# Patient Record
Sex: FEMALE | Race: BLACK OR AFRICAN AMERICAN | NOT HISPANIC OR LATINO | Employment: OTHER | URBAN - METROPOLITAN AREA
[De-identification: names, ages, dates, MRNs, and addresses within clinical notes are randomized per-mention and may not be internally consistent; named-entity substitution may affect disease eponyms.]

---

## 2023-04-04 ENCOUNTER — HOSPITAL ENCOUNTER (OUTPATIENT)
Dept: DATA CONVERSION | Facility: HOSPITAL | Age: 78
End: 2023-04-04
Attending: STUDENT IN AN ORGANIZED HEALTH CARE EDUCATION/TRAINING PROGRAM
Payer: MEDICARE

## 2023-04-04 DIAGNOSIS — G47.33 OBSTRUCTIVE SLEEP APNEA (ADULT) (PEDIATRIC): ICD-10-CM

## 2023-04-04 DIAGNOSIS — M54.12 RADICULOPATHY, CERVICAL REGION: ICD-10-CM

## 2023-04-04 DIAGNOSIS — F32.A DEPRESSION, UNSPECIFIED: ICD-10-CM

## 2023-04-04 DIAGNOSIS — I48.91 UNSPECIFIED ATRIAL FIBRILLATION (MULTI): ICD-10-CM

## 2023-04-04 DIAGNOSIS — K29.70 GASTRITIS, UNSPECIFIED, WITHOUT BLEEDING: ICD-10-CM

## 2023-04-04 DIAGNOSIS — F41.9 ANXIETY DISORDER, UNSPECIFIED: ICD-10-CM

## 2023-04-04 DIAGNOSIS — J44.9 CHRONIC OBSTRUCTIVE PULMONARY DISEASE, UNSPECIFIED (MULTI): ICD-10-CM

## 2023-04-04 DIAGNOSIS — E78.5 HYPERLIPIDEMIA, UNSPECIFIED: ICD-10-CM

## 2023-04-04 DIAGNOSIS — K21.9 GASTRO-ESOPHAGEAL REFLUX DISEASE WITHOUT ESOPHAGITIS: ICD-10-CM

## 2023-04-04 DIAGNOSIS — D12.3 BENIGN NEOPLASM OF TRANSVERSE COLON: ICD-10-CM

## 2023-04-04 DIAGNOSIS — Z92.21 PERSONAL HISTORY OF ANTINEOPLASTIC CHEMOTHERAPY: ICD-10-CM

## 2023-04-04 DIAGNOSIS — K57.30 DIVERTICULOSIS OF LARGE INTESTINE WITHOUT PERFORATION OR ABSCESS WITHOUT BLEEDING: ICD-10-CM

## 2023-04-04 DIAGNOSIS — Z98.0 INTESTINAL BYPASS AND ANASTOMOSIS STATUS: ICD-10-CM

## 2023-04-04 DIAGNOSIS — D12.4 BENIGN NEOPLASM OF DESCENDING COLON: ICD-10-CM

## 2023-04-04 DIAGNOSIS — I10 ESSENTIAL (PRIMARY) HYPERTENSION: ICD-10-CM

## 2023-04-04 DIAGNOSIS — Z85.51 PERSONAL HISTORY OF MALIGNANT NEOPLASM OF BLADDER: ICD-10-CM

## 2023-04-04 DIAGNOSIS — I25.2 OLD MYOCARDIAL INFARCTION: ICD-10-CM

## 2023-04-04 DIAGNOSIS — Z85.3 PERSONAL HISTORY OF MALIGNANT NEOPLASM OF BREAST: ICD-10-CM

## 2023-04-04 DIAGNOSIS — I25.10 ATHEROSCLEROTIC HEART DISEASE OF NATIVE CORONARY ARTERY WITHOUT ANGINA PECTORIS: ICD-10-CM

## 2023-04-04 DIAGNOSIS — Z86.73 PERSONAL HISTORY OF TRANSIENT ISCHEMIC ATTACK (TIA), AND CEREBRAL INFARCTION WITHOUT RESIDUAL DEFICITS: ICD-10-CM

## 2023-04-04 DIAGNOSIS — Z90.12 ACQUIRED ABSENCE OF LEFT BREAST AND NIPPLE: ICD-10-CM

## 2023-04-04 DIAGNOSIS — Z86.010 PERSONAL HISTORY OF COLONIC POLYPS: ICD-10-CM

## 2023-04-04 DIAGNOSIS — M35.3 POLYMYALGIA RHEUMATICA (MULTI): ICD-10-CM

## 2023-04-04 DIAGNOSIS — Z12.11 ENCOUNTER FOR SCREENING FOR MALIGNANT NEOPLASM OF COLON: ICD-10-CM

## 2023-04-04 DIAGNOSIS — K64.8 OTHER HEMORRHOIDS: ICD-10-CM

## 2023-04-04 DIAGNOSIS — K44.9 DIAPHRAGMATIC HERNIA WITHOUT OBSTRUCTION OR GANGRENE: ICD-10-CM

## 2023-04-04 DIAGNOSIS — R13.10 DYSPHAGIA, UNSPECIFIED: ICD-10-CM

## 2023-04-04 DIAGNOSIS — K64.4 RESIDUAL HEMORRHOIDAL SKIN TAGS: ICD-10-CM

## 2023-04-04 DIAGNOSIS — Z80.0 FAMILY HISTORY OF MALIGNANT NEOPLASM OF DIGESTIVE ORGANS: ICD-10-CM

## 2023-04-04 DIAGNOSIS — Z87.891 PERSONAL HISTORY OF NICOTINE DEPENDENCE: ICD-10-CM

## 2023-04-11 LAB
COMPLETE PATHOLOGY REPORT: NORMAL
CONVERTED CLINICAL DIAGNOSIS-HISTORY: NORMAL
CONVERTED FINAL DIAGNOSIS: NORMAL
CONVERTED FINAL REPORT PDF LINK TO COPY AND PASTE: NORMAL
CONVERTED GROSS DESCRIPTION: NORMAL

## 2023-04-25 DIAGNOSIS — R42 DIZZINESS: Primary | ICD-10-CM

## 2023-04-25 RX ORDER — MECLIZINE HCL 12.5 MG 12.5 MG/1
12.5 TABLET ORAL 3 TIMES DAILY PRN
Qty: 30 TABLET | Refills: 1 | Status: SHIPPED | OUTPATIENT
Start: 2023-04-25 | End: 2024-04-05 | Stop reason: HOSPADM

## 2023-04-25 RX ORDER — MECLIZINE HCL 12.5 MG 12.5 MG/1
12.5 TABLET ORAL 3 TIMES DAILY PRN
COMMUNITY
Start: 2023-01-18 | End: 2023-04-25 | Stop reason: SDUPTHER

## 2023-05-11 DIAGNOSIS — G47.00 INSOMNIA, UNSPECIFIED TYPE: Primary | ICD-10-CM

## 2023-05-11 RX ORDER — TRAZODONE HYDROCHLORIDE 50 MG/1
50 TABLET ORAL NIGHTLY PRN
COMMUNITY
End: 2023-05-11 | Stop reason: SDUPTHER

## 2023-05-11 RX ORDER — TRAZODONE HYDROCHLORIDE 50 MG/1
50 TABLET ORAL NIGHTLY PRN
Qty: 90 TABLET | Refills: 0 | Status: SHIPPED | OUTPATIENT
Start: 2023-05-11 | End: 2023-06-21 | Stop reason: SDUPTHER

## 2023-06-21 ENCOUNTER — OFFICE VISIT (OUTPATIENT)
Dept: PRIMARY CARE | Facility: CLINIC | Age: 78
End: 2023-06-21
Payer: MEDICARE

## 2023-06-21 VITALS
OXYGEN SATURATION: 94 % | WEIGHT: 168 LBS | DIASTOLIC BLOOD PRESSURE: 84 MMHG | BODY MASS INDEX: 27.96 KG/M2 | SYSTOLIC BLOOD PRESSURE: 152 MMHG | HEART RATE: 73 BPM

## 2023-06-21 DIAGNOSIS — D05.12 DUCTAL CARCINOMA IN SITU (DCIS) OF LEFT BREAST: ICD-10-CM

## 2023-06-21 DIAGNOSIS — I63.9 CEREBROVASCULAR ACCIDENT (CVA), UNSPECIFIED MECHANISM (MULTI): ICD-10-CM

## 2023-06-21 DIAGNOSIS — R53.83 FATIGUE, UNSPECIFIED TYPE: ICD-10-CM

## 2023-06-21 DIAGNOSIS — J44.9 CHRONIC OBSTRUCTIVE PULMONARY DISEASE, UNSPECIFIED COPD TYPE (MULTI): ICD-10-CM

## 2023-06-21 DIAGNOSIS — C67.9 UROTHELIAL CARCINOMA OF BLADDER (MULTI): ICD-10-CM

## 2023-06-21 DIAGNOSIS — J32.9 CHRONIC SINUSITIS, UNSPECIFIED LOCATION: ICD-10-CM

## 2023-06-21 DIAGNOSIS — I10 ESSENTIAL HYPERTENSION: Primary | ICD-10-CM

## 2023-06-21 DIAGNOSIS — I73.9 CLAUDICATION (CMS-HCC): ICD-10-CM

## 2023-06-21 DIAGNOSIS — H81.11 BENIGN PAROXYSMAL POSITIONAL VERTIGO OF RIGHT EAR: ICD-10-CM

## 2023-06-21 DIAGNOSIS — G47.00 INSOMNIA, UNSPECIFIED TYPE: ICD-10-CM

## 2023-06-21 DIAGNOSIS — E78.2 MIXED HYPERLIPIDEMIA: ICD-10-CM

## 2023-06-21 DIAGNOSIS — G95.9 CERVICAL MYELOPATHY (MULTI): ICD-10-CM

## 2023-06-21 DIAGNOSIS — R35.0 URINARY FREQUENCY: ICD-10-CM

## 2023-06-21 PROBLEM — M48.061 LUMBAR CANAL STENOSIS: Status: ACTIVE | Noted: 2023-06-21

## 2023-06-21 PROBLEM — R31.9 HEMATURIA: Status: ACTIVE | Noted: 2023-06-21

## 2023-06-21 PROBLEM — R63.4 WEIGHT LOSS, UNINTENTIONAL: Status: ACTIVE | Noted: 2023-06-21

## 2023-06-21 PROBLEM — I38 VALVULAR HEART DISEASE: Status: ACTIVE | Noted: 2023-06-21

## 2023-06-21 PROBLEM — R51.9 FREQUENT HEADACHES: Status: ACTIVE | Noted: 2023-06-21

## 2023-06-21 PROBLEM — I25.10 ATHEROSCLEROTIC HEART DISEASE OF NATIVE CORONARY ARTERY WITHOUT ANGINA PECTORIS: Status: ACTIVE | Noted: 2023-06-21

## 2023-06-21 PROBLEM — M50.00 CERVICAL DISC DISORDER WITH MYELOPATHY: Status: ACTIVE | Noted: 2023-06-21

## 2023-06-21 PROBLEM — K57.90 DIVERTICULOSIS: Status: ACTIVE | Noted: 2023-06-21

## 2023-06-21 PROBLEM — Z86.73: Status: ACTIVE | Noted: 2023-06-21

## 2023-06-21 PROBLEM — J30.9 ALLERGIC RHINITIS: Status: ACTIVE | Noted: 2023-06-21

## 2023-06-21 PROBLEM — R42 DIZZINESS: Status: ACTIVE | Noted: 2023-06-21

## 2023-06-21 PROBLEM — H52.223 REGULAR ASTIGMATISM OF BOTH EYES WITH PRESBYOPIA: Status: ACTIVE | Noted: 2023-06-21

## 2023-06-21 PROBLEM — R13.10 DYSPHAGIA: Status: ACTIVE | Noted: 2023-06-21

## 2023-06-21 PROBLEM — M54.2 CERVICALGIA: Status: ACTIVE | Noted: 2023-06-21

## 2023-06-21 PROBLEM — K57.32 DIVERTICULITIS OF COLON: Status: ACTIVE | Noted: 2023-06-21

## 2023-06-21 PROBLEM — E78.5 HYPERLIPIDEMIA: Status: ACTIVE | Noted: 2023-06-21

## 2023-06-21 PROBLEM — R20.2 NUMBNESS AND TINGLING IN BOTH HANDS: Status: ACTIVE | Noted: 2023-06-21

## 2023-06-21 PROBLEM — H53.461 RIGHT HOMONYMOUS HEMIANOPSIA: Status: ACTIVE | Noted: 2023-06-21

## 2023-06-21 PROBLEM — Z86.0100 HISTORY OF COLON POLYPS: Status: ACTIVE | Noted: 2023-06-21

## 2023-06-21 PROBLEM — M79.2 NEURALGIA AND NEURITIS: Status: ACTIVE | Noted: 2023-06-21

## 2023-06-21 PROBLEM — G47.33 OBSTRUCTIVE SLEEP APNEA: Status: ACTIVE | Noted: 2023-06-21

## 2023-06-21 PROBLEM — R79.9 ABNORMAL BLOOD CHEMISTRY: Status: ACTIVE | Noted: 2023-06-21

## 2023-06-21 PROBLEM — N64.89 RADIAL SCAR OF BREAST: Status: ACTIVE | Noted: 2023-06-21

## 2023-06-21 PROBLEM — H90.3 ASYMMETRIC SNHL (SENSORINEURAL HEARING LOSS): Status: ACTIVE | Noted: 2023-06-21

## 2023-06-21 PROBLEM — C50.412 MALIGNANT NEOPLASM OF UPPER-OUTER QUADRANT OF LEFT FEMALE BREAST (MULTI): Status: ACTIVE | Noted: 2023-06-21

## 2023-06-21 PROBLEM — F32.A DEPRESSION: Status: ACTIVE | Noted: 2023-06-21

## 2023-06-21 PROBLEM — H93.12 TINNITUS OF LEFT EAR: Status: ACTIVE | Noted: 2023-06-21

## 2023-06-21 PROBLEM — R26.9 GAIT DISTURBANCE: Status: ACTIVE | Noted: 2023-06-21

## 2023-06-21 PROBLEM — F41.9 ANXIETY: Status: ACTIVE | Noted: 2023-06-21

## 2023-06-21 PROBLEM — M79.606 LOWER EXTREMITY PAIN: Status: ACTIVE | Noted: 2023-06-21

## 2023-06-21 PROBLEM — N20.0 KIDNEY STONE: Status: ACTIVE | Noted: 2023-06-21

## 2023-06-21 PROBLEM — D49.4 BLADDER TUMOR: Status: ACTIVE | Noted: 2023-06-21

## 2023-06-21 PROBLEM — R10.9 RIGHT FLANK PAIN: Status: ACTIVE | Noted: 2023-06-21

## 2023-06-21 PROBLEM — K29.70 GASTRITIS: Status: ACTIVE | Noted: 2023-06-21

## 2023-06-21 PROBLEM — R31.0 CLOT HEMATURIA: Status: ACTIVE | Noted: 2023-06-21

## 2023-06-21 PROBLEM — R42 VERTIGO: Status: ACTIVE | Noted: 2023-06-21

## 2023-06-21 PROBLEM — J98.4 RESTRICTIVE LUNG DISEASE: Status: ACTIVE | Noted: 2023-06-21

## 2023-06-21 PROBLEM — R94.39 ABNORMAL STRESS TEST: Status: ACTIVE | Noted: 2023-06-21

## 2023-06-21 PROBLEM — R93.89 ABNORMAL CHEST CT: Status: ACTIVE | Noted: 2023-06-21

## 2023-06-21 PROBLEM — J42 CHRONIC BRONCHITIS (MULTI): Status: ACTIVE | Noted: 2023-06-21

## 2023-06-21 PROBLEM — H25.13 AGE-RELATED NUCLEAR CATARACT OF BOTH EYES: Status: ACTIVE | Noted: 2023-06-21

## 2023-06-21 PROBLEM — M48.02 DEGENERATIVE CERVICAL SPINAL STENOSIS: Status: ACTIVE | Noted: 2023-06-21

## 2023-06-21 PROBLEM — R20.0 NUMBNESS AND TINGLING IN BOTH HANDS: Status: ACTIVE | Noted: 2023-06-21

## 2023-06-21 PROBLEM — R44.1 VISUAL HALLUCINATIONS: Status: ACTIVE | Noted: 2023-06-21

## 2023-06-21 PROBLEM — H04.123 DRY EYES, BILATERAL: Status: ACTIVE | Noted: 2023-06-21

## 2023-06-21 PROBLEM — H52.03 HYPEROPIA OF BOTH EYES: Status: ACTIVE | Noted: 2023-06-21

## 2023-06-21 PROBLEM — Z98.1 S/P CERVICAL SPINAL FUSION: Status: ACTIVE | Noted: 2023-06-21

## 2023-06-21 PROBLEM — H25.813 COMBINED FORM OF AGE-RELATED CATARACT, BOTH EYES: Status: ACTIVE | Noted: 2023-06-21

## 2023-06-21 PROBLEM — N32.89 BLADDER MASS: Status: ACTIVE | Noted: 2023-06-21

## 2023-06-21 PROBLEM — K21.9 ESOPHAGEAL REFLUX: Status: ACTIVE | Noted: 2023-06-21

## 2023-06-21 PROBLEM — R68.81 EARLY SATIETY: Status: ACTIVE | Noted: 2023-06-21

## 2023-06-21 PROBLEM — M19.90 ARTHRITIS: Status: ACTIVE | Noted: 2023-06-21

## 2023-06-21 PROBLEM — M75.02 ADHESIVE CAPSULITIS OF LEFT SHOULDER: Status: ACTIVE | Noted: 2023-06-21

## 2023-06-21 PROBLEM — R41.3 MEMORY CHANGES: Status: ACTIVE | Noted: 2023-06-21

## 2023-06-21 PROBLEM — M25.519 SHOULDER PAIN: Status: ACTIVE | Noted: 2023-06-21

## 2023-06-21 PROBLEM — R59.1 LYMPHADENOPATHY: Status: ACTIVE | Noted: 2023-06-21

## 2023-06-21 PROBLEM — H90.3 BILATERAL SENSORINEURAL HEARING LOSS: Status: ACTIVE | Noted: 2023-06-21

## 2023-06-21 PROBLEM — Z86.010 HISTORY OF COLON POLYPS: Status: ACTIVE | Noted: 2023-06-21

## 2023-06-21 PROBLEM — M47.12 CERVICAL SPONDYLOSIS WITH MYELOPATHY: Status: ACTIVE | Noted: 2023-06-21

## 2023-06-21 PROBLEM — M50.20 CERVICAL DISC HERNIATION: Status: ACTIVE | Noted: 2023-06-21

## 2023-06-21 PROBLEM — M54.16 LUMBAR RADICULITIS: Status: ACTIVE | Noted: 2023-06-21

## 2023-06-21 PROBLEM — E55.9 VITAMIN D INSUFFICIENCY: Status: ACTIVE | Noted: 2023-06-21

## 2023-06-21 PROBLEM — F17.210 CIGARETTE NICOTINE DEPENDENCE WITHOUT COMPLICATION: Status: ACTIVE | Noted: 2023-06-21

## 2023-06-21 PROBLEM — H52.4 REGULAR ASTIGMATISM OF BOTH EYES WITH PRESBYOPIA: Status: ACTIVE | Noted: 2023-06-21

## 2023-06-21 PROBLEM — R09.81 CONGESTION OF NASAL SINUS: Status: ACTIVE | Noted: 2023-06-21

## 2023-06-21 PROBLEM — M25.559 HIP PAIN: Status: ACTIVE | Noted: 2023-06-21

## 2023-06-21 PROBLEM — M35.3 PMR (POLYMYALGIA RHEUMATICA) (MULTI): Status: ACTIVE | Noted: 2023-06-21

## 2023-06-21 PROBLEM — R92.8 MAMMOGRAM ABNORMAL: Status: ACTIVE | Noted: 2023-06-21

## 2023-06-21 PROCEDURE — 99214 OFFICE O/P EST MOD 30 MIN: CPT | Performed by: PHYSICIAN ASSISTANT

## 2023-06-21 PROCEDURE — 1160F RVW MEDS BY RX/DR IN RCRD: CPT | Performed by: PHYSICIAN ASSISTANT

## 2023-06-21 PROCEDURE — 1159F MED LIST DOCD IN RCRD: CPT | Performed by: PHYSICIAN ASSISTANT

## 2023-06-21 PROCEDURE — 1036F TOBACCO NON-USER: CPT | Performed by: PHYSICIAN ASSISTANT

## 2023-06-21 PROCEDURE — 3077F SYST BP >= 140 MM HG: CPT | Performed by: PHYSICIAN ASSISTANT

## 2023-06-21 PROCEDURE — 3079F DIAST BP 80-89 MM HG: CPT | Performed by: PHYSICIAN ASSISTANT

## 2023-06-21 RX ORDER — ASPIRIN 81 MG/1
1 TABLET ORAL DAILY
COMMUNITY
Start: 2022-05-02

## 2023-06-21 RX ORDER — NORTRIPTYLINE HYDROCHLORIDE 10 MG/1
20 CAPSULE ORAL NIGHTLY
COMMUNITY
Start: 2022-10-19 | End: 2023-06-21 | Stop reason: ALTCHOICE

## 2023-06-21 RX ORDER — FUROSEMIDE 20 MG/1
1 TABLET ORAL DAILY
COMMUNITY
Start: 2018-05-21 | End: 2024-01-08 | Stop reason: SDUPTHER

## 2023-06-21 RX ORDER — OMEPRAZOLE 20 MG/1
CAPSULE, DELAYED RELEASE ORAL
COMMUNITY
Start: 2023-04-04 | End: 2023-06-21 | Stop reason: ALTCHOICE

## 2023-06-21 RX ORDER — APIXABAN 5 MG/1
1 TABLET, FILM COATED ORAL 2 TIMES DAILY
COMMUNITY
Start: 2022-04-04 | End: 2024-01-24 | Stop reason: SDUPTHER

## 2023-06-21 RX ORDER — METOPROLOL TARTRATE 25 MG/1
1 TABLET, FILM COATED ORAL 2 TIMES DAILY
COMMUNITY
Start: 2022-04-25 | End: 2024-01-08 | Stop reason: SDUPTHER

## 2023-06-21 RX ORDER — IBUPROFEN 200 MG
TABLET ORAL
COMMUNITY
Start: 2022-08-03 | End: 2023-06-21 | Stop reason: ALTCHOICE

## 2023-06-21 RX ORDER — AMLODIPINE BESYLATE 5 MG/1
1 TABLET ORAL DAILY
COMMUNITY
Start: 2015-06-08 | End: 2024-04-09 | Stop reason: SDUPTHER

## 2023-06-21 RX ORDER — LOSARTAN POTASSIUM 100 MG/1
1 TABLET ORAL DAILY
COMMUNITY
Start: 2013-01-21 | End: 2023-11-17 | Stop reason: HOSPADM

## 2023-06-21 RX ORDER — ROSUVASTATIN CALCIUM 5 MG/1
1 TABLET, COATED ORAL NIGHTLY
COMMUNITY
Start: 2022-04-25 | End: 2024-05-13 | Stop reason: SDUPTHER

## 2023-06-21 RX ORDER — NITROGLYCERIN 0.4 MG/1
TABLET SUBLINGUAL
COMMUNITY
Start: 2016-11-21 | End: 2024-01-22 | Stop reason: SDUPTHER

## 2023-06-21 RX ORDER — FLUTICASONE PROPIONATE 50 MCG
SPRAY, SUSPENSION (ML) NASAL
COMMUNITY
Start: 2023-03-14 | End: 2023-06-21 | Stop reason: ALTCHOICE

## 2023-06-21 RX ORDER — TRAZODONE HYDROCHLORIDE 50 MG/1
50 TABLET ORAL NIGHTLY PRN
Qty: 90 TABLET | Refills: 0 | Status: SHIPPED | OUTPATIENT
Start: 2023-06-21 | End: 2024-03-21

## 2023-06-21 NOTE — PROGRESS NOTES
"Subjective   Jazzy Schaeffer is a 77 y.o. female who presents for Follow-up.  HPI Jazzy Schaeffer is a 77 y.o. female presenting for a follow up. Generally doing well. Currently c/o:    Urinary frequency, nocturia: No UTI symptoms (fevers, chills, hematuria, dysuria, burning with urination, urine or vaginal odor, abnormal discharge, etc.). She does note having brown urine once last week, but it resolved.     Fatigue: takes VB12 and multivitamin. Need sleep study results, not in EMR for review    CVA: Left PCA territory infarction 3/2022. Stable on 81mg ASA and eliquis 5mg 2x daily. Follows with neurology.  No new neurological deficits or strokelike symptoms.     HTN, HLD, CAD: Hasn't taken meds yet today. Stable on 81mg ASA, Crestor 5mg daily, Amlodipine 5mg, Furosemide 20mg, Losartan 100mg, Metoprolol tartrate 25mg. Follows with cardiology, scheduled next in August 2023.  Denies any dizziness, chest pain, SOB/MARION, palpitations, LE edema.    COPD, nicotine dependence: former smoker. Not on any inhalers. No wheezing, SOB/MARION.     BPPV: follows with ENT, last seen 3/14/23. She still is having episodes of vertigo, which affect her ADLs.     L breast CA: \"s/p left breast mastectomy on 5/22/19. She had a left breast excisional biopsy on 4/8/19 for a core biopsy atypical ductal hyperplasia. Final pathology yielded ductal carcinoma in situ, ER + 60%, UT + 1-5%, 1.8 cm, positive margin < 1mm, pTis. She had a completion mastectomy because she had a previous lumpectomy sentinel node biopsy and radiation therapy for cancer 30 years previously. which showed residual DCIS with clear margins.\" Follows with breast surgery. Scheduled for a mammogram 8/10/23.     Insomnia: difficulty staying asleep. Endorses falling asleep at 9pm and waking by 12am. She does not nap at all during the day. Uses proper sleep hygiene. Completed a sleep study as ordered by neurologist, but hasn't received the results. Not in EMR for review. "     Health maintenance:  Immunizations:  -Flu: deferred to fall 2023  -Pneumococcal: UTD  -Shingrix: Received 1 dose of Shingrix, encouraged second dose from pharmacy  -Tdap: recommended every 10 years, obtain from local pharmacy  Mammogram UTD (last 8/4/22) - per Dr. Elmore   Colon CA screening UTD (last 11/15/17) - 5yrs., Look in records   DEXA DUE (none prior) - ordered     Last MCR / CPE: 1/18/23 (MCR ADV)    Immunizations:  -Flu: UTD  -COVID: received 4 doses  -Pneumococcal: UTD  -Shingrix: received 1 dose, needs 2nd dose    12 point ROS reviewed and negative other than as stated in HPI    /84   Pulse 73   Wt 76.2 kg (168 lb)   SpO2 94%   BMI 27.96 kg/m²   Objective   Physical Exam  Vitals reviewed.   Constitutional:       General: She is not in acute distress.     Appearance: Normal appearance. She is not ill-appearing.   HENT:      Head: Normocephalic and atraumatic.   Eyes:      General: No scleral icterus.     Extraocular Movements: Extraocular movements intact.      Conjunctiva/sclera: Conjunctivae normal.      Pupils: Pupils are equal, round, and reactive to light.   Cardiovascular:      Rate and Rhythm: Normal rate and regular rhythm.      Heart sounds: Normal heart sounds. No murmur heard.     No friction rub. No gallop.   Pulmonary:      Effort: Pulmonary effort is normal. No respiratory distress.      Breath sounds: Normal breath sounds. No stridor. No wheezing, rhonchi or rales.   Musculoskeletal:      Cervical back: Normal range of motion.      Right lower leg: No edema.      Left lower leg: No edema.   Skin:     General: Skin is warm and dry.   Neurological:      Mental Status: She is alert and oriented to person, place, and time. Mental status is at baseline.      Cranial Nerves: No cranial nerve deficit.      Gait: Gait normal.   Psychiatric:         Mood and Affect: Mood normal.         Behavior: Behavior normal.         Assessment/Plan   Problem List Items Addressed This Visit        "Benign paroxysmal positional vertigo of right ear     Patient still with symptoms of vertigo despite meclizine use.  Referral for vestibular physical therapy placed         Relevant Orders    Referral to Physical Therapy    Cervical myelopathy (CMS/[facility])     Stable. S/p C3-4, C4-5, C5-6 Anterior Cervical Discectomy and Fusion with neurosurgeon, Dr. Major, on 6/15/2020         Chronic sinusitis     Referral to ENT placed per patient's request.         Relevant Orders    Referral to ENT    Claudication (CMS/[facility])     Stable.  Continue 81 mg ASA and regular exercise.         COPD (chronic obstructive pulmonary disease) (CMS/[facility])     Stable without any symptoms of wheezing, cough, sputum, SOB/MARION.  Not on any inhalers.  Former smoker.         Ductal carcinoma in situ (DCIS) of left breast     \"s/p left breast mastectomy on 5/22/19. She had a left breast excisional biopsy on 4/8/19 for a core biopsy atypical ductal hyperplasia. Final pathology yielded ductal carcinoma in situ, ER + 60%, NV + 1-5%, 1.8 cm, positive margin < 1mm, pTis. She had a completion mastectomy because she had a previous lumpectomy sentinel node biopsy and radiation therapy for cancer 30 years previously. which showed residual DCIS with clear margins.\" Follows with breast surgery. Scheduled for a mammogram 8/10/23.          Fatigue     Continue multivitamin and vitamin B12 daily.  Need results of sleep study as EVA is on the differential given excessive daytime sleepiness.         Essential hypertension - Primary     BP above goal.  Encouraged patient to take BP medications when she gets home.  Use the home BP cuff to monitor readings closely.  If consistently >130/80, I recommend switching losartan 100 mg to olmesartan 40 mg or valsartan 320 mg.  Alternatively could increase amlodipine to 10 mg daily.  Follow strict DASH diet and exercise regularly as tolerated.         Insomnia     Begin retaking trazodone 50 mg at bedtime.  No sleep study in EMR " for review at this time, contact neurology who ordered it         Relevant Medications    traZODone (Desyrel) 50 mg tablet    Hyperlipidemia     Stable.  Continue 81 mg ASA and rosuvastatin 5 mg.  Follow Mediterranean-style diet and exercise as tolerated         Urinary frequency     Consider UA/culture. Encouraged Kegel exercises.  Follow-up with urologist if persistent.         Stroke (CMS/HCC)     Left PCA territory infarction 3/2022. Stable on 81mg ASA and eliquis 5mg 2x daily. Follow with neurology.  Go to the ED/call EMS with any acute neurodeficits or strokelike symptoms.         Urothelial carcinoma of bladder (CMS/HCC)     S/p TURBT and chemotherapy. Follows with Urologist, Dr. Blela.              Follow-up in 4 months or sooner as needed

## 2023-06-23 PROBLEM — R31.0 CLOT HEMATURIA: Status: RESOLVED | Noted: 2023-06-21 | Resolved: 2023-06-23

## 2023-06-23 NOTE — ASSESSMENT & PLAN NOTE
Continue multivitamin and vitamin B12 daily.  Need results of sleep study as EVA is on the differential given excessive daytime sleepiness.

## 2023-06-23 NOTE — ASSESSMENT & PLAN NOTE
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 Stable on 81mg ASA and eliquis 5mg 2x daily. Follow with neurology.  Go to the ED/call EMS with any acute neurodeficits or strokelike symptoms.

## 2023-06-23 NOTE — ASSESSMENT & PLAN NOTE
BP above goal.  Encouraged patient to take BP medications when she gets home.  Use the home BP cuff to monitor readings closely.  If consistently >130/80, I recommend switching losartan 100 mg to olmesartan 40 mg or valsartan 320 mg.  Alternatively could increase amlodipine to 10 mg daily.  Follow strict DASH diet and exercise regularly as tolerated.

## 2023-06-23 NOTE — ASSESSMENT & PLAN NOTE
Begin retaking trazodone 50 mg at bedtime.  No sleep study in EMR for review at this time, contact neurology who ordered it

## 2023-06-23 NOTE — ASSESSMENT & PLAN NOTE
Patient still with symptoms of vertigo despite meclizine use.  Referral for vestibular physical therapy placed

## 2023-06-23 NOTE — ASSESSMENT & PLAN NOTE
Stable.  Continue 81 mg ASA and rosuvastatin 5 mg.  Follow Mediterranean-style diet and exercise as tolerated

## 2023-06-23 NOTE — ASSESSMENT & PLAN NOTE
"\"s/p left breast mastectomy on 5/22/19. She had a left breast excisional biopsy on 4/8/19 for a core biopsy atypical ductal hyperplasia. Final pathology yielded ductal carcinoma in situ, ER + 60%, CA + 1-5%, 1.8 cm, positive margin < 1mm, pTis. She had a completion mastectomy because she had a previous lumpectomy sentinel node biopsy and radiation therapy for cancer 30 years previously. which showed residual DCIS with clear margins.\" Follows with breast surgery. Scheduled for a mammogram 8/10/23.   "

## 2023-06-23 NOTE — ASSESSMENT & PLAN NOTE
Stable. S/p C3-4, C4-5, C5-6 Anterior Cervical Discectomy and Fusion with neurosurgeon, Dr. Major, on 6/15/2020

## 2023-06-23 NOTE — ASSESSMENT & PLAN NOTE
Stable without any symptoms of wheezing, cough, sputum, SOB/MARION.  Not on any inhalers.  Former smoker.

## 2023-10-23 PROBLEM — R29.818 HEMISENSORY DEFICIT: Status: ACTIVE | Noted: 2023-10-23

## 2023-11-12 ENCOUNTER — HOSPITAL ENCOUNTER (INPATIENT)
Facility: HOSPITAL | Age: 78
LOS: 5 days | Discharge: NURSING, REHAB OR LONG-TERM CARE | DRG: 698 | End: 2023-11-17
Attending: EMERGENCY MEDICINE | Admitting: INTERNAL MEDICINE
Payer: MEDICARE

## 2023-11-12 ENCOUNTER — APPOINTMENT (OUTPATIENT)
Dept: RADIOLOGY | Facility: HOSPITAL | Age: 78
DRG: 698 | End: 2023-11-12
Payer: MEDICARE

## 2023-11-12 DIAGNOSIS — Z86.73 HISTORY OF CARDIOEMBOLIC STROKE: ICD-10-CM

## 2023-11-12 DIAGNOSIS — N28.0 RENAL INFARCTION (MULTI): Primary | ICD-10-CM

## 2023-11-12 DIAGNOSIS — R79.89 ELEVATED TROPONIN: ICD-10-CM

## 2023-11-12 DIAGNOSIS — I50.9 ACUTE ON CHRONIC CONGESTIVE HEART FAILURE, UNSPECIFIED HEART FAILURE TYPE (MULTI): ICD-10-CM

## 2023-11-12 DIAGNOSIS — I48.0 PAROXYSMAL ATRIAL FIBRILLATION (MULTI): ICD-10-CM

## 2023-11-12 DIAGNOSIS — R10.9 RIGHT FLANK PAIN: ICD-10-CM

## 2023-11-12 DIAGNOSIS — I70.1 ATHEROSCLEROSIS OF RENAL ARTERY (CMS-HCC): ICD-10-CM

## 2023-11-12 LAB
ALBUMIN SERPL BCP-MCNC: 4.1 G/DL (ref 3.4–5)
ALP SERPL-CCNC: 74 U/L (ref 33–136)
ALT SERPL W P-5'-P-CCNC: 53 U/L (ref 7–45)
ANION GAP SERPL CALC-SCNC: 16 MMOL/L (ref 10–20)
APPEARANCE UR: CLEAR
AST SERPL W P-5'-P-CCNC: 79 U/L (ref 9–39)
BASOPHILS # BLD AUTO: 0.03 X10*3/UL (ref 0–0.1)
BASOPHILS NFR BLD AUTO: 0.4 %
BILIRUB SERPL-MCNC: 0.6 MG/DL (ref 0–1.2)
BILIRUB UR STRIP.AUTO-MCNC: NEGATIVE MG/DL
BNP SERPL-MCNC: 699 PG/ML (ref 0–99)
BUN SERPL-MCNC: 10 MG/DL (ref 6–23)
CALCIUM SERPL-MCNC: 9.2 MG/DL (ref 8.6–10.6)
CARDIAC TROPONIN I PNL SERPL HS: 385 NG/L (ref 0–34)
CARDIAC TROPONIN I PNL SERPL HS: 416 NG/L (ref 0–34)
CHLORIDE SERPL-SCNC: 103 MMOL/L (ref 98–107)
CO2 SERPL-SCNC: 24 MMOL/L (ref 21–32)
COLOR UR: YELLOW
CREAT SERPL-MCNC: 1.08 MG/DL (ref 0.5–1.05)
EOSINOPHIL # BLD AUTO: 0.12 X10*3/UL (ref 0–0.4)
EOSINOPHIL NFR BLD AUTO: 1.7 %
ERYTHROCYTE [DISTWIDTH] IN BLOOD BY AUTOMATED COUNT: 14.6 % (ref 11.5–14.5)
EST. AVERAGE GLUCOSE BLD GHB EST-MCNC: 114 MG/DL
GFR SERPL CREATININE-BSD FRML MDRD: 53 ML/MIN/1.73M*2
GLUCOSE SERPL-MCNC: 85 MG/DL (ref 74–99)
GLUCOSE UR STRIP.AUTO-MCNC: NEGATIVE MG/DL
HBA1C MFR BLD: 5.6 %
HCT VFR BLD AUTO: 42 % (ref 36–46)
HGB BLD-MCNC: 13.2 G/DL (ref 12–16)
HOLD SPECIMEN: NORMAL
IMM GRANULOCYTES # BLD AUTO: 0.02 X10*3/UL (ref 0–0.5)
IMM GRANULOCYTES NFR BLD AUTO: 0.3 % (ref 0–0.9)
KETONES UR STRIP.AUTO-MCNC: NEGATIVE MG/DL
LEUKOCYTE ESTERASE UR QL STRIP.AUTO: NEGATIVE
LIPASE SERPL-CCNC: 10 U/L (ref 9–82)
LYMPHOCYTES # BLD AUTO: 1.62 X10*3/UL (ref 0.8–3)
LYMPHOCYTES NFR BLD AUTO: 23.3 %
MCH RBC QN AUTO: 25.9 PG (ref 26–34)
MCHC RBC AUTO-ENTMCNC: 31.4 G/DL (ref 32–36)
MCV RBC AUTO: 83 FL (ref 80–100)
MONOCYTES # BLD AUTO: 0.74 X10*3/UL (ref 0.05–0.8)
MONOCYTES NFR BLD AUTO: 10.6 %
NEUTROPHILS # BLD AUTO: 4.42 X10*3/UL (ref 1.6–5.5)
NEUTROPHILS NFR BLD AUTO: 63.7 %
NITRITE UR QL STRIP.AUTO: NEGATIVE
NRBC BLD-RTO: 0 /100 WBCS (ref 0–0)
PH UR STRIP.AUTO: 7 [PH]
PLATELET # BLD AUTO: 252 X10*3/UL (ref 150–450)
POTASSIUM SERPL-SCNC: 4.2 MMOL/L (ref 3.5–5.3)
PROT SERPL-MCNC: 6.9 G/DL (ref 6.4–8.2)
PROT UR STRIP.AUTO-MCNC: ABNORMAL MG/DL
RBC # BLD AUTO: 5.09 X10*6/UL (ref 4–5.2)
RBC # UR STRIP.AUTO: NEGATIVE /UL
RBC #/AREA URNS AUTO: NORMAL /HPF
SODIUM SERPL-SCNC: 139 MMOL/L (ref 136–145)
SP GR UR STRIP.AUTO: 1.01
SQUAMOUS #/AREA URNS AUTO: NORMAL /HPF
UROBILINOGEN UR STRIP.AUTO-MCNC: 2 MG/DL
WBC # BLD AUTO: 7 X10*3/UL (ref 4.4–11.3)
WBC #/AREA URNS AUTO: NORMAL /HPF

## 2023-11-12 PROCEDURE — 85025 COMPLETE CBC W/AUTO DIFF WBC: CPT

## 2023-11-12 PROCEDURE — 81001 URINALYSIS AUTO W/SCOPE: CPT

## 2023-11-12 PROCEDURE — 99285 EMERGENCY DEPT VISIT HI MDM: CPT | Mod: 25 | Performed by: EMERGENCY MEDICINE

## 2023-11-12 PROCEDURE — 36415 COLL VENOUS BLD VENIPUNCTURE: CPT

## 2023-11-12 PROCEDURE — 71046 X-RAY EXAM CHEST 2 VIEWS: CPT | Mod: FOREIGN READ | Performed by: RADIOLOGY

## 2023-11-12 PROCEDURE — 96375 TX/PRO/DX INJ NEW DRUG ADDON: CPT | Performed by: EMERGENCY MEDICINE

## 2023-11-12 PROCEDURE — 2500000004 HC RX 250 GENERAL PHARMACY W/ HCPCS (ALT 636 FOR OP/ED)

## 2023-11-12 PROCEDURE — 2550000001 HC RX 255 CONTRASTS

## 2023-11-12 PROCEDURE — 1210000001 HC SEMI-PRIVATE ROOM DAILY

## 2023-11-12 PROCEDURE — 83036 HEMOGLOBIN GLYCOSYLATED A1C: CPT

## 2023-11-12 PROCEDURE — 74177 CT ABD & PELVIS W/CONTRAST: CPT | Performed by: STUDENT IN AN ORGANIZED HEALTH CARE EDUCATION/TRAINING PROGRAM

## 2023-11-12 PROCEDURE — 83880 ASSAY OF NATRIURETIC PEPTIDE: CPT

## 2023-11-12 PROCEDURE — 2500000001 HC RX 250 WO HCPCS SELF ADMINISTERED DRUGS (ALT 637 FOR MEDICARE OP)

## 2023-11-12 PROCEDURE — 74177 CT ABD & PELVIS W/CONTRAST: CPT

## 2023-11-12 PROCEDURE — 76937 US GUIDE VASCULAR ACCESS: CPT

## 2023-11-12 PROCEDURE — 96374 THER/PROPH/DIAG INJ IV PUSH: CPT | Performed by: EMERGENCY MEDICINE

## 2023-11-12 PROCEDURE — 99285 EMERGENCY DEPT VISIT HI MDM: CPT

## 2023-11-12 PROCEDURE — 99223 1ST HOSP IP/OBS HIGH 75: CPT

## 2023-11-12 PROCEDURE — 93010 ELECTROCARDIOGRAM REPORT: CPT | Performed by: EMERGENCY MEDICINE

## 2023-11-12 PROCEDURE — 96361 HYDRATE IV INFUSION ADD-ON: CPT | Performed by: EMERGENCY MEDICINE

## 2023-11-12 PROCEDURE — 83690 ASSAY OF LIPASE: CPT

## 2023-11-12 PROCEDURE — 71046 X-RAY EXAM CHEST 2 VIEWS: CPT | Mod: FY,FR

## 2023-11-12 PROCEDURE — 80053 COMPREHEN METABOLIC PANEL: CPT

## 2023-11-12 PROCEDURE — 84484 ASSAY OF TROPONIN QUANT: CPT

## 2023-11-12 RX ORDER — TRAZODONE HYDROCHLORIDE 50 MG/1
50 TABLET ORAL NIGHTLY PRN
Status: DISCONTINUED | OUTPATIENT
Start: 2023-11-12 | End: 2023-11-17 | Stop reason: HOSPADM

## 2023-11-12 RX ORDER — METOPROLOL TARTRATE 25 MG/1
25 TABLET, FILM COATED ORAL 2 TIMES DAILY
Status: DISCONTINUED | OUTPATIENT
Start: 2023-11-12 | End: 2023-11-17 | Stop reason: HOSPADM

## 2023-11-12 RX ORDER — FUROSEMIDE 20 MG/1
20 TABLET ORAL
Status: DISCONTINUED | OUTPATIENT
Start: 2023-11-13 | End: 2023-11-17 | Stop reason: HOSPADM

## 2023-11-12 RX ORDER — ROSUVASTATIN CALCIUM 5 MG/1
5 TABLET, COATED ORAL NIGHTLY
Status: DISCONTINUED | OUTPATIENT
Start: 2023-11-12 | End: 2023-11-17 | Stop reason: HOSPADM

## 2023-11-12 RX ORDER — OXYCODONE HYDROCHLORIDE 5 MG/1
5 TABLET ORAL EVERY 6 HOURS PRN
Status: DISCONTINUED | OUTPATIENT
Start: 2023-11-12 | End: 2023-11-17 | Stop reason: HOSPADM

## 2023-11-12 RX ORDER — AMLODIPINE BESYLATE 5 MG/1
5 TABLET ORAL DAILY
Status: DISCONTINUED | OUTPATIENT
Start: 2023-11-12 | End: 2023-11-17 | Stop reason: HOSPADM

## 2023-11-12 RX ORDER — ONDANSETRON 4 MG/1
4 TABLET, FILM COATED ORAL EVERY 8 HOURS PRN
Status: DISCONTINUED | OUTPATIENT
Start: 2023-11-12 | End: 2023-11-17 | Stop reason: HOSPADM

## 2023-11-12 RX ORDER — FUROSEMIDE 10 MG/ML
20 INJECTION INTRAMUSCULAR; INTRAVENOUS ONCE
Status: COMPLETED | OUTPATIENT
Start: 2023-11-12 | End: 2023-11-12

## 2023-11-12 RX ORDER — ACETAMINOPHEN 325 MG/1
975 TABLET ORAL EVERY 8 HOURS
Status: DISCONTINUED | OUTPATIENT
Start: 2023-11-12 | End: 2023-11-17 | Stop reason: HOSPADM

## 2023-11-12 RX ORDER — MECLIZINE HCL 12.5 MG 12.5 MG/1
12.5 TABLET ORAL 3 TIMES DAILY PRN
Status: DISCONTINUED | OUTPATIENT
Start: 2023-11-12 | End: 2023-11-17 | Stop reason: HOSPADM

## 2023-11-12 RX ORDER — ASPIRIN 81 MG/1
81 TABLET ORAL DAILY
Status: DISCONTINUED | OUTPATIENT
Start: 2023-11-12 | End: 2023-11-17 | Stop reason: HOSPADM

## 2023-11-12 RX ORDER — ONDANSETRON HYDROCHLORIDE 2 MG/ML
4 INJECTION, SOLUTION INTRAVENOUS ONCE
Status: COMPLETED | OUTPATIENT
Start: 2023-11-12 | End: 2023-11-12

## 2023-11-12 RX ORDER — MORPHINE SULFATE 4 MG/ML
4 INJECTION INTRAVENOUS ONCE
Status: COMPLETED | OUTPATIENT
Start: 2023-11-12 | End: 2023-11-12

## 2023-11-12 RX ORDER — KETOROLAC TROMETHAMINE 15 MG/ML
15 INJECTION, SOLUTION INTRAMUSCULAR; INTRAVENOUS ONCE
Status: COMPLETED | OUTPATIENT
Start: 2023-11-12 | End: 2023-11-12

## 2023-11-12 RX ORDER — POLYETHYLENE GLYCOL 3350 17 G/17G
17 POWDER, FOR SOLUTION ORAL DAILY
Status: DISCONTINUED | OUTPATIENT
Start: 2023-11-13 | End: 2023-11-17 | Stop reason: HOSPADM

## 2023-11-12 RX ORDER — LOSARTAN POTASSIUM 25 MG/1
100 TABLET ORAL DAILY
Status: DISCONTINUED | OUTPATIENT
Start: 2023-11-13 | End: 2023-11-16

## 2023-11-12 RX ADMIN — ONDANSETRON 4 MG: 2 INJECTION INTRAMUSCULAR; INTRAVENOUS at 14:15

## 2023-11-12 RX ADMIN — METOPROLOL TARTRATE 25 MG: 50 TABLET, FILM COATED ORAL at 22:28

## 2023-11-12 RX ADMIN — OXYCODONE HYDROCHLORIDE 5 MG: 5 TABLET ORAL at 22:28

## 2023-11-12 RX ADMIN — ASPIRIN 81 MG: 81 TABLET, COATED ORAL at 22:28

## 2023-11-12 RX ADMIN — AMLODIPINE BESYLATE 5 MG: 5 TABLET ORAL at 22:27

## 2023-11-12 RX ADMIN — IOHEXOL 100 ML: 350 INJECTION, SOLUTION INTRAVENOUS at 17:32

## 2023-11-12 RX ADMIN — KETOROLAC TROMETHAMINE 15 MG: 15 INJECTION, SOLUTION INTRAMUSCULAR; INTRAVENOUS at 14:15

## 2023-11-12 RX ADMIN — APIXABAN 5 MG: 5 TABLET, FILM COATED ORAL at 22:28

## 2023-11-12 RX ADMIN — ACETAMINOPHEN 975 MG: 325 TABLET ORAL at 22:27

## 2023-11-12 RX ADMIN — MORPHINE SULFATE 4 MG: 4 INJECTION INTRAVENOUS at 18:29

## 2023-11-12 RX ADMIN — TRAZODONE HYDROCHLORIDE 50 MG: 50 TABLET ORAL at 22:28

## 2023-11-12 RX ADMIN — SODIUM CHLORIDE, SODIUM LACTATE, POTASSIUM CHLORIDE, AND CALCIUM CHLORIDE 1000 ML: 600; 310; 30; 20 INJECTION, SOLUTION INTRAVENOUS at 15:15

## 2023-11-12 RX ADMIN — FUROSEMIDE 20 MG: 10 INJECTION, SOLUTION INTRAVENOUS at 18:29

## 2023-11-12 RX ADMIN — ROSUVASTATIN CALCIUM 5 MG: 10 TABLET, FILM COATED ORAL at 22:10

## 2023-11-12 ASSESSMENT — PAIN SCALES - GENERAL
PAINLEVEL_OUTOF10: 6
PAINLEVEL_OUTOF10: 6

## 2023-11-12 ASSESSMENT — LIFESTYLE VARIABLES
HAVE YOU EVER FELT YOU SHOULD CUT DOWN ON YOUR DRINKING: NO
EVER HAD A DRINK FIRST THING IN THE MORNING TO STEADY YOUR NERVES TO GET RID OF A HANGOVER: NO
REASON UNABLE TO ASSESS: NO
EVER FELT BAD OR GUILTY ABOUT YOUR DRINKING: NO
HAVE PEOPLE ANNOYED YOU BY CRITICIZING YOUR DRINKING: NO

## 2023-11-12 ASSESSMENT — COLUMBIA-SUICIDE SEVERITY RATING SCALE - C-SSRS
1. IN THE PAST MONTH, HAVE YOU WISHED YOU WERE DEAD OR WISHED YOU COULD GO TO SLEEP AND NOT WAKE UP?: NO
2. HAVE YOU ACTUALLY HAD ANY THOUGHTS OF KILLING YOURSELF?: NO
6. HAVE YOU EVER DONE ANYTHING, STARTED TO DO ANYTHING, OR PREPARED TO DO ANYTHING TO END YOUR LIFE?: NO

## 2023-11-12 ASSESSMENT — PAIN - FUNCTIONAL ASSESSMENT: PAIN_FUNCTIONAL_ASSESSMENT: 0-10

## 2023-11-12 NOTE — ED TRIAGE NOTES
Pt reports right flank pain and RLQ abd pain since last night. Also reports mild congestion with SOB on exertion. Reports increased urinary frequency, constipation, hip pain.

## 2023-11-12 NOTE — ED PROVIDER NOTES
"HPI   Chief Complaint   Patient presents with    Abdominal Pain     Right flank and RLQ       Patient is a 78-year-old female with past medical history of CVA (eliquis), GERD, gastritis, BPPV, chronic bronchitis, diverticulitis, breast cancer, HTN, HLD, and CHF (EF 45%) that presents today for abdominal pain.  Reports right flank pain and right lower quadrant abdominal pain.  Reports increased urinary frequency.  No noted dysuria or hematuria.  Notes that initially, approximately 3 days ago while at work, she noticed a right-sided flank pain with radiation down to her back.  Over the past few days, pain has progressively worsened and now radiating up to her right upper quadrant abdomen.  Has associated nausea but no emesis.  Reports chills but no fever.  Reports constipation though last bowel movement was last night.  Reports attempting to take a medicine for pain relief.  She states that the medication starts with a \"B\" but is unsure what it was.  Reports feeling relief with this medication.    Also reports mild chest congestion for approximately 1 week.  Notes that her complaints are not getting better or worse. Has a history of COPD/chronic bronchitis though has not used an inhaler in the past 2 years.  Reports feeling as if there is mucus stuck in her chest and needs to cough it up though was unable to.  Reports some chest pain with cough in the upper mid region of her chest though no pain at rest or exertion.  Denies shortness of breath or palpitations.  No lower extremity swelling.  No history of PE or DVT.  Anticoagulated on Eliquis.  Reports no nasal congestion, headache, ear pain or sore throat.          History provided by:  Patient                      No data recorded                Patient History   Past Medical History:   Diagnosis Date    Adhesive capsulitis of left shoulder 08/04/2022    Adhesive capsulitis of left shoulder    Personal history of other benign neoplasm     History of other benign " neoplasm     Past Surgical History:   Procedure Laterality Date    BI MAMMO GUIDED LOCALIZATION BREAST LEFT Left 2019    BI MAMMO GUIDED LOCALIZATION BREAST LEFT 2019 [facility]    BI MAMMO GUIDED LOCALIZATION BREAST RIGHT Right 11/3/2017    BI MAMMO GUIDED LOCALIZATION BREAST RIGHT [place] [facility] [place] [place]    BREAST LUMPECTOMY  2014    Breast Surgery Lumpectomy    COLON SURGERY  2017    Colon Surgery    COLONOSCOPY  2015    Complete Colonoscopy    CT ANGIO HEART CORONARY  2020    CT HEART [place]ONARY [place] [facility] [place]    CT HEAD ANGIO W AND WO IV CONTRAST  3/31/2022    CT HEAD ANGIO W AND WO IV CONTRAST 3/31/2022 [facility]    CT NECK ANGIO W AND WO IV CONTRAST  3/31/2022    CT NECK ANGIO W AND WO IV CONTRAST 3/31/2022 [facility]ICAL [facility]    MR HEAD ANGIO WO IV CONTRAST  3/30/2022    MR HEAD ANGIO WO IV CONTRAST 3/30/2022 [facility] CL[facility]ICAL [facility]    MR NECK ANGIO WO IV CONTRAST  3/30/2022    MR NECK ANGIO WO IV CONTRAST 3/30/2022 [facility] CL[facility]ICAL [facility]    OTHER SURGICAL HISTORY  2017    Hysterectomy Total, With Removal Of Both Tubes And Both Ovaries     No family history on file.  Social History     Tobacco Use    Smoking status: Former     Types: Cigarettes     Quit date:      Years since quittin.8    Smokeless tobacco: Never   Substance Use Topics    Alcohol use: Not on file    Drug use: Not on file       Physical Exam   ED Triage Vitals [23 1342]   Temp Heart Rate Resp BP   36.9 °C (98.4 °F) 73 18 161/81      SpO2 Temp src Heart Rate Source Patient Position   98 % -- -- --      BP Location FiO2 (%)     -- --       Physical Exam  Vitals and nursing note reviewed.   Constitutional:       General: She is not in acute distress.     Appearance: Normal appearance. She is not ill-appearing.   HENT:      Head: Normocephalic and atraumatic.      Right Ear: External ear normal.      Left Ear: External ear normal.      Nose: Nose  normal. No congestion or rhinorrhea.      Mouth/Throat:      Pharynx: Oropharynx is clear. No oropharyngeal exudate or posterior oropharyngeal erythema.   Eyes:      Extraocular Movements: Extraocular movements intact.      Conjunctiva/sclera: Conjunctivae normal.      Pupils: Pupils are equal, round, and reactive to light.   Cardiovascular:      Rate and Rhythm: Normal rate and regular rhythm.      Heart sounds: No murmur heard.     No friction rub. No gallop.   Pulmonary:      Effort: Pulmonary effort is normal. No accessory muscle usage or respiratory distress.      Breath sounds: No stridor. Rhonchi (in bilateral lung regions, gone with cough) present. No wheezing or rales.   Abdominal:      General: Abdomen is flat. Bowel sounds are normal. There is no distension.      Palpations: Abdomen is soft. There is no mass.      Tenderness: There is abdominal tenderness in the right upper quadrant. There is right CVA tenderness and guarding. There is no left CVA tenderness or rebound. Positive signs include Desai's sign.   Musculoskeletal:         General: No swelling or deformity. Normal range of motion.      Cervical back: Normal range of motion and neck supple.      Right lower leg: Edema present.      Left lower leg: Edema present.   Lymphadenopathy:      Cervical: No cervical adenopathy.   Skin:     General: Skin is warm and dry.      Capillary Refill: Capillary refill takes less than 2 seconds.      Findings: No laceration, lesion or rash.   Neurological:      General: No focal deficit present.      Mental Status: She is alert and oriented to person, place, and time.      Cranial Nerves: Cranial nerves 2-12 are intact.      Sensory: No sensory deficit.      Motor: Motor function is intact. No weakness.      Gait: Gait normal.      Deep Tendon Reflexes: Reflexes normal.   Psychiatric:         Mood and Affect: Mood and affect normal.         Speech: Speech normal.         Behavior: Behavior normal. Behavior is  cooperative.         Thought Content: Thought content normal.         Judgment: Judgment normal.         ED Course & MDM   ED Course as of 11/12/23 1858   Sun Nov 12, 2023   1524 ECG 12 lead  EKG shows a normal rate and rhythm, normal axis, normal intervals. And normal ST and T wave pattern with no evidence of acute ischemia or other acute findings.  No changes from previous EKG.  67 bpm.  QTc 437. [ML]   1530 Urinalysis with Reflex Microscopic and Culture(!)  No blood, leukocyte estrace or nitrites in urine. Lower concern for UTI/pyelonephritis. Less likely nephrolithiasis [ML]   1709 Lipase  Wnl, no concerns for pancreatitis [ML]   1709 Comprehensive metabolic panel(!)  No noted electrolyte abnormalities.  Creatinine is mildly elevated at 1.08.  This may be due to decrease in oral intake.  We will give him IV fluids for rehydration.  There is also mildly elevated AST and ALT.  CT abdomen for further evaluation considering she is having right upper quadrant abdominal pain. [ML]   1711 CBC and Auto Differential(!)  No noted leukocytosis or acute anemia [ML]   1755 XR chest 2 views  Unremarkable chest x-ray [ML]   1756 CT abdomen pelvis w IV contrast  Interval development of large wedge-shaped hypoenhancing areas in the mid to inferior portions of the right kidney with no significant  associated fat stranding or urothelial thickening. Findings concerning for right renal infarction.  [ML]   1757 Troponin I, High Sensitivity(!!)  Elevated at 416. Will trend troponin [ML]   1843 BNP(!): 699  699, could be experiencing CHF exacerbation. Previous echo 45% EF [ML]   1853 Troponin I, High Sensitivity(!!)  2nd troponin 385. This appears to be down trending [ML]      ED Course User Index  [ML] Malgorzata Farr PA-C         Diagnoses as of 11/12/23 1858   Renal infarction (CMS/HCC)   Elevated troponin   Acute on chronic congestive heart failure, unspecified heart failure type (CMS/HCC)       Medical Decision Making  Patient is a  78-year-old female reports today for right-sided flank pain with radiation down her back and to her right upper quadrant.  On exam, she does have significant CVA tenderness as well as significant right upper quadrant abdominal pain with palpation.  There is a small amount of guarding noted and positive Desai sign.  She does report urinary frequency increased though no noted dysuria or hematuria.  Considering she is having urinary symptoms, CVA tenderness, as well as right upper quadrant abdominal pain, my differentials include pyelonephritis versus nephrolithiasis there versus cholecystitis.  I do believe that the patient will benefit from CT abdomen to rule out these acute abdominal differentials.  Vital signs are stable.  No noted fever, tachycardia or hypoxia.  Will also order troponin to r/o ACS as older women do present for ACS atypically, such as lower abdominal pain. We will also do abdominal lab work and UA.  We will give her IV fluids, Toradol and Zofran for symptomatic control.    Also complaining of chest congestion with cough.  Notes that she feels congestion in her chest where she feels as if she needs to get the mucus out but is unable to.  Has attempted to take Mucinex.  History of COPD though does not use any inhalers.  On lung exam, there is notable rhonchi in her bilateral upper lung fields.  Rhonchi is cleared after she coughs.  She does report some noted upper midsternal chest pain with cough but not without.  No reported shortness of breath or palpitations.  Vital signs are within normal limits.  We will do chest x-ray to rule out pneumonia though this differential is less likely.  I do believe since she has chronic bronchitis as well as this notable chest congestion and rhonchi in her lungs, this patient will benefit from azithromycin treatment for possible COPD exacerbation.  Considering she notes some pain with cough, I did consider ACS work-up though I do believe that this is less likely.   We will do a EKG to rule out this.    CBC came back unrevealing without any leukocytosis or acute anemia.  CMP does have a mildly elevated creatinine at 1.08 with EGFR of 53.  There is a mild elevation of AST/ALT at 53 and 79.  Considering she has right upper quadrant abdominal pain, hepatic etiology could be likely.  Her urine did come back negative without any notable leukocyte Estrace, nitrates or blood in her urine.  CT abdomen did reveal hypoenhancing areas to the mid and inferior portions of her right kidney.  These findings are concerning for right renal infarction.  This would make sense clinically considering she is having right CVA tenderness but no noted infection on her urine.    Troponin did come back elevated at 416.  We did delta troponin this and it mildly decreased down to 385.  I believe that this is less likely ACS, especially since her EKG is nonischemic.  On reevaluation, she does have notable lower extremity pitting edema.  She does have CHF with last ejection fracture of 45%.  I added on a BNP which revealed BNP elevation at 699.  Diuresed her with 20 mg of Lasix here in the ED.    Patient is complaining of uncontrolled pain.  Gave her 4 mg of morphine and 15 mg of Toradol.  Considering she is having uncontrolled pain, elevations in troponin, and this renal infarct, I do believe that the patient would benefit from being admitted to the hospital for further evaluation, stress testing, and renal ultrasound as well as trending of her troponins.  At this time, she is complaining of pain though she has remained hemodynamically stable.    Case with ED attending Dr. Patrick Palma.    Amount and/or Complexity of Data Reviewed  Labs: ordered.  Radiology: ordered.  ECG/medicine tests: ordered.        Procedure  Procedures        ATTENDING ATTESTATION  78-year-old with multiple medical comorbidities including heart failure with reduced ejection fraction EF 45% last measured March 2022 presenting to the  emergency department with 1 week of cough, had transient malaise at the onset that is since resolved denies any fevers, endorses right upper abdominal and right flank discomfort with urinary frequency denying hematuria or dysuria.  Patient is hemodynamically stable on arrival no fever, abdomen is soft, tender in the right upper quadrant without guarding, has some right CVA tenderness, there is no right lower quadrant pain on palpation, unremarkable cardiopulmonary exam without rub, the patient does demonstrate 2+ jugular venous distention, 1+ pitting ankle edema symmetrically without overlying erythema or induration.  She has faint expiratory wheeze.  Patient has clinical appearance of heart failure exacerbation.  Labs revealed an elevated troponin, EKG reassuring nonischemic, there was no sign of diminished QRS amplitude, there was no diminished heart sounds lowering my suspicion for a pericardial effusion in the setting of a myocarditis, there is no evidence of pericarditis on the EKG.  We will diurese the patient with 20 mg of Lasix.  She has faint end expiratory wheeze bilaterally mostly in the bases lowering suspicion for primary COPD, rather likely heart failure.    Urinalysis was unremarkable, no leukocyte esterase no nitrite, no bacteria on microscopy, 1+ protein only, while the patient does have some CVA and right upper quadrant discomfort, she has no primary infectious symptoms, and the mild nasal elevation would suggest that the patient has some possible mild hepatic engorgement secondary to her heart failure probably causing the right upper quadrant discomfort.  Further notable is that the patient has a negative Desai sign, she does not have bilirubin elevation, the CT scan was negative for any overt biliary pathology and my suspicion is clinically low.  Patient was diuresed with Lasix, we will hold on antibiotics given the unremarkable urinalysis, we will repeat the patient's vitals, should she have  fever or any change in her clinical condition we will treat acutely.  We will add a culture to the patient's urine.  She will be admitted    EKG reviewed independently by me and agree with interpretation above.    Patrick Palma, [facility]    The patient was seen by the resident/fellow.  I have personally performed a substantive portion of the encounter.  I have seen and examined the patient; agree with the workup, evaluation, MDM, management and diagnosis.    I have reviewed all the nurses' notes and have confirmed their findings, and have incorporated those findings into this medical record.   The care plan has been discussed with the resident/fellow; I have reviewed the resident/fellow’s note and agree with the documented findings with the exception/addition of information listed above.  On my own examination I agree and incorporated in this document my own history, examination findings and clinical decision making.  All notation in this Addendum supersedes information presented by the resident or [facility]MEN[facility]A as listed above.        Malgorzata Farr PA-C  11/12/23 [id number]

## 2023-11-13 LAB
ALBUMIN SERPL BCP-MCNC: 4 G/DL (ref 3.4–5)
ALP SERPL-CCNC: 69 U/L (ref 33–136)
ALT SERPL W P-5'-P-CCNC: 79 U/L (ref 7–45)
ANION GAP SERPL CALC-SCNC: 13 MMOL/L (ref 10–20)
APTT PPP: 33 SECONDS (ref 27–38)
AST SERPL W P-5'-P-CCNC: 81 U/L (ref 9–39)
BILIRUB SERPL-MCNC: 0.6 MG/DL (ref 0–1.2)
BUN SERPL-MCNC: 13 MG/DL (ref 6–23)
CALCIUM SERPL-MCNC: 9.2 MG/DL (ref 8.6–10.6)
CHLORIDE SERPL-SCNC: 102 MMOL/L (ref 98–107)
CO2 SERPL-SCNC: 30 MMOL/L (ref 21–32)
CREAT SERPL-MCNC: 1.25 MG/DL (ref 0.5–1.05)
DRVVT SCREEN TO CONFIRM RATIO: 0.91 RATIO
DRVVT/DRVVT CFM NRMLZD PPP-RTO: 1.61 RATIO
DRVVT/DRVVT CFM P DOAC NEUT NORM PPP-RTO: 0.57 RATIO
ERYTHROCYTE [DISTWIDTH] IN BLOOD BY AUTOMATED COUNT: 14.4 % (ref 11.5–14.5)
GFR SERPL CREATININE-BSD FRML MDRD: 45 ML/MIN/1.73M*2
GLUCOSE SERPL-MCNC: 99 MG/DL (ref 74–99)
HCT VFR BLD AUTO: 39.4 % (ref 36–46)
HGB BLD-MCNC: 12.5 G/DL (ref 12–16)
HOLD SPECIMEN: NORMAL
LA 2 SCREEN W REFLEX-IMP: NORMAL
LDH SERPL L TO P-CCNC: 820 U/L (ref 84–246)
MCH RBC QN AUTO: 25.9 PG (ref 26–34)
MCHC RBC AUTO-ENTMCNC: 31.7 G/DL (ref 32–36)
MCV RBC AUTO: 82 FL (ref 80–100)
NORMALIZED SCT PPP-RTO: 0.53 RATIO
NRBC BLD-RTO: 0 /100 WBCS (ref 0–0)
PLATELET # BLD AUTO: 216 X10*3/UL (ref 150–450)
POTASSIUM SERPL-SCNC: 3.5 MMOL/L (ref 3.5–5.3)
PROT SERPL-MCNC: 6.7 G/DL (ref 6.4–8.2)
RBC # BLD AUTO: 4.82 X10*6/UL (ref 4–5.2)
SILICA CLOTTING TIME CONFIRMATION: 0.97 RATIO
SILICA CLOTTING TIME SCREEN: 0.51 RATIO
SODIUM SERPL-SCNC: 141 MMOL/L (ref 136–145)
UFH PPP CHRO-ACNC: >2 IU/ML
UFH PPP CHRO-ACNC: >2 IU/ML
WBC # BLD AUTO: 8.6 X10*3/UL (ref 4.4–11.3)

## 2023-11-13 PROCEDURE — 85730 THROMBOPLASTIN TIME PARTIAL: CPT | Performed by: STUDENT IN AN ORGANIZED HEALTH CARE EDUCATION/TRAINING PROGRAM

## 2023-11-13 PROCEDURE — 2500000001 HC RX 250 WO HCPCS SELF ADMINISTERED DRUGS (ALT 637 FOR MEDICARE OP)

## 2023-11-13 PROCEDURE — 85520 HEPARIN ASSAY: CPT

## 2023-11-13 PROCEDURE — 1100000001 HC PRIVATE ROOM DAILY

## 2023-11-13 PROCEDURE — 80053 COMPREHEN METABOLIC PANEL: CPT

## 2023-11-13 PROCEDURE — 85730 THROMBOPLASTIN TIME PARTIAL: CPT

## 2023-11-13 PROCEDURE — 85300 ANTITHROMBIN III ACTIVITY: CPT

## 2023-11-13 PROCEDURE — 36415 COLL VENOUS BLD VENIPUNCTURE: CPT

## 2023-11-13 PROCEDURE — 2500000004 HC RX 250 GENERAL PHARMACY W/ HCPCS (ALT 636 FOR OP/ED)

## 2023-11-13 PROCEDURE — 85306 CLOT INHIBIT PROT S FREE: CPT

## 2023-11-13 PROCEDURE — 99223 1ST HOSP IP/OBS HIGH 75: CPT

## 2023-11-13 PROCEDURE — 83615 LACTATE (LD) (LDH) ENZYME: CPT

## 2023-11-13 PROCEDURE — 99233 SBSQ HOSP IP/OBS HIGH 50: CPT

## 2023-11-13 PROCEDURE — 85027 COMPLETE CBC AUTOMATED: CPT

## 2023-11-13 PROCEDURE — 85303 CLOT INHIBIT PROT C ACTIVITY: CPT

## 2023-11-13 RX ORDER — HEPARIN SODIUM 5000 [USP'U]/ML
2000-4000 INJECTION, SOLUTION INTRAVENOUS; SUBCUTANEOUS EVERY 4 HOURS PRN
Status: DISCONTINUED | OUTPATIENT
Start: 2023-11-13 | End: 2023-11-17 | Stop reason: HOSPADM

## 2023-11-13 RX ORDER — HEPARIN SODIUM 5000 [USP'U]/ML
4000 INJECTION, SOLUTION INTRAVENOUS; SUBCUTANEOUS ONCE
Status: COMPLETED | OUTPATIENT
Start: 2023-11-13 | End: 2023-11-13

## 2023-11-13 RX ORDER — HEPARIN SODIUM 10000 [USP'U]/100ML
0-4000 INJECTION, SOLUTION INTRAVENOUS CONTINUOUS
Status: DISCONTINUED | OUTPATIENT
Start: 2023-11-13 | End: 2023-11-16

## 2023-11-13 RX ADMIN — FUROSEMIDE 20 MG: 40 TABLET ORAL at 09:38

## 2023-11-13 RX ADMIN — LOSARTAN POTASSIUM 100 MG: 50 TABLET, FILM COATED ORAL at 09:38

## 2023-11-13 RX ADMIN — METOPROLOL TARTRATE 25 MG: 50 TABLET, FILM COATED ORAL at 20:39

## 2023-11-13 RX ADMIN — HEPARIN SODIUM 4000 UNITS: 5000 INJECTION INTRAVENOUS; SUBCUTANEOUS at 12:13

## 2023-11-13 RX ADMIN — ACETAMINOPHEN 975 MG: 325 TABLET ORAL at 22:15

## 2023-11-13 RX ADMIN — OXYCODONE HYDROCHLORIDE 5 MG: 5 TABLET ORAL at 17:13

## 2023-11-13 RX ADMIN — HEPARIN SODIUM 1000 UNITS/HR: 10000 INJECTION, SOLUTION INTRAVENOUS at 12:13

## 2023-11-13 RX ADMIN — APIXABAN 5 MG: 5 TABLET, FILM COATED ORAL at 09:38

## 2023-11-13 RX ADMIN — ACETAMINOPHEN 975 MG: 325 TABLET ORAL at 06:29

## 2023-11-13 RX ADMIN — METOPROLOL TARTRATE 25 MG: 50 TABLET, FILM COATED ORAL at 09:38

## 2023-11-13 RX ADMIN — OXYCODONE HYDROCHLORIDE 5 MG: 5 TABLET ORAL at 06:29

## 2023-11-13 SDOH — SOCIAL STABILITY: SOCIAL INSECURITY: HAVE YOU HAD THOUGHTS OF HARMING ANYONE ELSE?: NO

## 2023-11-13 SDOH — SOCIAL STABILITY: SOCIAL INSECURITY: ARE YOU OR HAVE YOU BEEN THREATENED OR ABUSED PHYSICALLY, EMOTIONALLY, OR SEXUALLY BY ANYONE?: NO

## 2023-11-13 SDOH — SOCIAL STABILITY: SOCIAL INSECURITY: ARE THERE ANY APPARENT SIGNS OF INJURIES/BEHAVIORS THAT COULD BE RELATED TO ABUSE/NEGLECT?: NO

## 2023-11-13 SDOH — SOCIAL STABILITY: SOCIAL INSECURITY: DO YOU FEEL UNSAFE GOING BACK TO THE PLACE WHERE YOU ARE LIVING?: NO

## 2023-11-13 SDOH — SOCIAL STABILITY: SOCIAL INSECURITY: WERE YOU ABLE TO COMPLETE ALL THE BEHAVIORAL HEALTH SCREENINGS?: YES

## 2023-11-13 SDOH — SOCIAL STABILITY: SOCIAL INSECURITY: DOES ANYONE TRY TO KEEP YOU FROM HAVING/CONTACTING OTHER FRIENDS OR DOING THINGS OUTSIDE YOUR HOME?: NO

## 2023-11-13 SDOH — SOCIAL STABILITY: SOCIAL INSECURITY: HAS ANYONE EVER THREATENED TO HURT YOUR FAMILY OR YOUR PETS?: NO

## 2023-11-13 SDOH — SOCIAL STABILITY: SOCIAL INSECURITY: DO YOU FEEL ANYONE HAS EXPLOITED OR TAKEN ADVANTAGE OF YOU FINANCIALLY OR OF YOUR PERSONAL PROPERTY?: NO

## 2023-11-13 ASSESSMENT — LIFESTYLE VARIABLES
AUDIT-C TOTAL SCORE: 0
SKIP TO QUESTIONS 9-10: 1
HOW MANY STANDARD DRINKS CONTAINING ALCOHOL DO YOU HAVE ON A TYPICAL DAY: PATIENT DOES NOT DRINK
AUDIT-C TOTAL SCORE: 0
PRESCIPTION_ABUSE_PAST_12_MONTHS: NO
HOW OFTEN DO YOU HAVE A DRINK CONTAINING ALCOHOL: NEVER
SUBSTANCE_ABUSE_PAST_12_MONTHS: NO
HOW OFTEN DO YOU HAVE 6 OR MORE DRINKS ON ONE OCCASION: NEVER

## 2023-11-13 ASSESSMENT — PAIN SCALES - GENERAL
PAINLEVEL_OUTOF10: 0 - NO PAIN
PAINLEVEL_OUTOF10: 7
PAINLEVEL_OUTOF10: 0 - NO PAIN
PAINLEVEL_OUTOF10: 0 - NO PAIN
PAINLEVEL_OUTOF10: 5 - MODERATE PAIN
PAINLEVEL_OUTOF10: 0 - NO PAIN
PAINLEVEL_OUTOF10: 7

## 2023-11-13 ASSESSMENT — COGNITIVE AND FUNCTIONAL STATUS - GENERAL
HELP NEEDED FOR BATHING: A LITTLE
TOILETING: A LITTLE
WALKING IN HOSPITAL ROOM: A LITTLE
MOBILITY SCORE: 21
CLIMB 3 TO 5 STEPS WITH RAILING: A LITTLE
STANDING UP FROM CHAIR USING ARMS: A LITTLE
DRESSING REGULAR UPPER BODY CLOTHING: A LITTLE
PATIENT BASELINE BEDBOUND: NO
PERSONAL GROOMING: A LITTLE
DAILY ACTIVITIY SCORE: 19
DRESSING REGULAR LOWER BODY CLOTHING: A LITTLE

## 2023-11-13 ASSESSMENT — ACTIVITIES OF DAILY LIVING (ADL)
GROOMING: INDEPENDENT
HEARING - LEFT EAR: FUNCTIONAL
PATIENT'S MEMORY ADEQUATE TO SAFELY COMPLETE DAILY ACTIVITIES?: YES
WALKS IN HOME: INDEPENDENT
ADEQUATE_TO_COMPLETE_ADL: YES
FEEDING YOURSELF: INDEPENDENT
JUDGMENT_ADEQUATE_SAFELY_COMPLETE_DAILY_ACTIVITIES: YES
ASSISTIVE_DEVICE: EYEGLASSES
LACK_OF_TRANSPORTATION: NO
DRESSING YOURSELF: INDEPENDENT
TOILETING: INDEPENDENT
HEARING - RIGHT EAR: FUNCTIONAL
BATHING: INDEPENDENT

## 2023-11-13 ASSESSMENT — PATIENT HEALTH QUESTIONNAIRE - PHQ9
2. FEELING DOWN, DEPRESSED OR HOPELESS: NOT AT ALL
SUM OF ALL RESPONSES TO PHQ9 QUESTIONS 1 & 2: 0
1. LITTLE INTEREST OR PLEASURE IN DOING THINGS: NOT AT ALL

## 2023-11-13 ASSESSMENT — PAIN DESCRIPTION - DESCRIPTORS: DESCRIPTORS: ACHING

## 2023-11-13 ASSESSMENT — PAIN - FUNCTIONAL ASSESSMENT
PAIN_FUNCTIONAL_ASSESSMENT: 0-10

## 2023-11-13 ASSESSMENT — PAIN DESCRIPTION - LOCATION
LOCATION: BACK
LOCATION: BACK

## 2023-11-13 NOTE — H&P
"MEDICINE ADMISSION NOTE    History of Present Illness  Jazzy Schaeffer is a 78 y.o. female with PMHx of Cardioembolic CVA (eliquis), GERD, gastritis, BPPV, COPD/chronic bronchitis (not on inhalers), paroxysmal AF, diverticulitis, breast cancer s/p left breast mastectomy on 5/22/19. HTN, HLD, and CHF (EF 45%) that presents today for abdominal pain.      Pt reports right flank pain for the past 4days which is constant in nature, severity about 10 at presentation, radiates to the Rt lower extremity and right lower abd, mildly relieved with pain meds with no aggravating factor ,no hx of fall or trauma to the site     There's  associated urinary frequency,nausea and weakness in the Rt LE. However has  no fever, dysuria or hematuria or vomiting.  Reports some mild congetion in the past week and feels mucus stuck in her chest despite trying to cough it out. Has not had any worsening cough, SOB, orthopnoea or worsening Lower extremty edema or weight gain    Pt reports sometimes not complying to her meds, Eliquis, Baseline she's able to mobilize post stroke and do her iADLS/ADLs    Review of Systems: 12 point review of system unremarkable except as stated above    ED Course:  ED Triage Vitals [11/12/23 1342]   Temp Heart Rate Resp BP   36.9 °C (98.4 °F) 73 18 161/81      SpO2 Temp src Heart Rate Source Patient Position   98 % -- -- --      BP Location FiO2 (%)     -- --         Current Vitals  Vitals:    11/12/23 1342 11/12/23 1900   BP: 161/81 163/89   Pulse: 73 83   Resp: 18 18   Temp: 36.9 °C (98.4 °F) 36.5 °C (97.7 °F)   SpO2: 98% 97%   Weight: 83.9 kg (185 lb)    Height: 1.651 m (5' 5\")            Labs:     CBC: WBC 7.0 , HGB 13.2,   BMP: , K 4.2, Cl 103, HCO3 24, BUN 10, CR 1.08, Glu 85  LFTS: AST 79 , ALT 53, ALKPHOS 74 , TBILI 0.6 , DBILI   TROP: 385<<416  BNP: 699<<541  UA:   Results from last 7 days   Lab Units 11/12/23  1438   COLOR U  Yellow   PH U  7.0   SPEC GRAV UR  1.011   PROTEIN U mg/dL 30 " (1+)*   BLOOD UR  NEGATIVE   NITRITE U  NEGATIVE   WBC UR /HPF 1-5     At the ED, pt was given 1l of LR, ketorolac,morphine and furosemide    EKG    Normal sinus arrhythmia, HR-74    Imaging  11/12/2023 CXR  No acute cardiopulmonary process.      CTAP   Interval development of large wedge-shaped hypoenhancing areas in  the mid to inferior portions of the right kidney with no significant  associated fat stranding or urothelial thickening. Findings  concerning for right renal infarction. Pyelonephritis is a  possibility, for correlation with urinalysis. The right renal artery  and vein appear patent within the limits of the current exam.  2. Small hiatal hernia. Apparent gastric wall thickening which can be  secondary to nondistention versus mild gastritis.  3. Additional chronic findings are as described above.    ED Interventions:  Medications   amLODIPine (Norvasc) tablet 5 mg (has no administration in time range)   aspirin EC tablet 81 mg (has no administration in time range)   apixaban (Eliquis) tablet 5 mg (has no administration in time range)   furosemide (Lasix) tablet 20 mg (has no administration in time range)   losartan (Cozaar) tablet 100 mg (has no administration in time range)   meclizine (Antivert) tablet 12.5 mg (has no administration in time range)   metoprolol tartrate (Lopressor) tablet 25 mg (has no administration in time range)   rosuvastatin (Crestor) tablet 5 mg (has no administration in time range)   traZODone (Desyrel) tablet 50 mg (has no administration in time range)   polyethylene glycol (Glycolax, Miralax) packet 17 g (has no administration in time range)   acetaminophen (Tylenol) tablet 975 mg (has no administration in time range)   oxyCODONE (Roxicodone) immediate release tablet 5 mg (has no administration in time range)   ondansetron (Zofran) tablet 4 mg (has no administration in time range)   lactated Ringer's bolus 1,000 mL (0 mL intravenous Stopped 11/12/23 1615)   ketorolac  (Toradol) injection 15 mg (15 mg intravenous Given 11/12/23 1415)   ondansetron (Zofran) injection 4 mg (4 mg intravenous Given 11/12/23 1415)   iohexol (OMNIPaque) 350 mg iodine/mL solution 100 mL (100 mL intravenous Given 11/12/23 1732)   morphine injection 4 mg (4 mg intravenous Given 11/12/23 1829)   furosemide (Lasix) injection 20 mg (20 mg intravenous Given 11/12/23 1829)     Scheduled medications  acetaminophen, 975 mg, oral, q8h  amLODIPine, 5 mg, oral, Daily  apixaban, 5 mg, oral, BID  aspirin, 81 mg, oral, Daily  [START ON 11/13/2023] furosemide, 20 mg, oral, Every Mon/Wed/Fri  [START ON 11/13/2023] losartan, 100 mg, oral, Daily  metoprolol tartrate, 25 mg, oral, BID  [START ON 11/13/2023] polyethylene glycol, 17 g, oral, Daily  rosuvastatin, 5 mg, oral, Nightly      Home Medications @ date:  Amlodipine 5mg  Aspirin 81mg  Duloxetine 30mg daily  Eliquis 5mg bid  Lasix 20mg daily  Losartan 100mg  Meclizine 12.56mg  Metop 25mg  Nitro 0.4mg  Crestor 5mg  Trazodone 50mg    Continuous medications     PRN medications  PRN medications: meclizine, ondansetron, oxyCODONE, traZODone   Prior to Admission medications    Medication Sig Start Date End Date Taking? Authorizing Provider   amLODIPine (Norvasc) 5 mg tablet Take 1 tablet (5 mg) by mouth once daily. 6/8/15   Historical Provider, MD   aspirin 81 mg EC tablet Take 1 tablet (81 mg) by mouth once daily. 5/2/22   Historical Provider, MD   DULoxetine (Cymbalta) 30 mg DR capsule Take 1 capsule (30 mg) by mouth once daily. 6/30/23   Historical Provider, MD   Eliquis 5 mg tablet Take 1 tablet (5 mg) by mouth 2 times a day. 4/4/22   Historical Provider, MD   furosemide (Lasix) 20 mg tablet Take 1 tablet (20 mg) by mouth once a day on Monday, Wednesday, and Friday. 5/21/18   Historical Provider, MD   losartan (Cozaar) 100 mg tablet Take 1 tablet (100 mg) by mouth once daily. 1/21/13   Historical Provider, MD   meclizine (Antivert) 12.5 mg tablet Take 1 tablet (12.5 mg)  by mouth 3 times a day as needed for dizziness. 4/25/23   Astrid Ceballos PA-C   metoprolol tartrate (Lopressor) 25 mg tablet Take 1 tablet (25 mg) by mouth 2 times a day. 4/25/22   Historical Provider, MD   nitroglycerin (Nitrostat) 0.4 mg SL tablet Place under the tongue. 11/21/16   Historical Provider, MD   rosuvastatin (Crestor) 5 mg tablet Take 1 tablet (5 mg) by mouth once daily. 4/25/22   Historical Provider, MD   traZODone (Desyrel) 50 mg tablet Take 1 tablet (50 mg) by mouth as needed at bedtime for sleep. 6/21/23   Astrid Ceballos PA-C         Cardiac Hx:  Last echo:   3/31/2022 Echo  The left ventricular systolic function is mildly decreased with a 45% estimated ejection fraction.  2. Spectral Doppler shows a restrictive pattern of left ventricular diastolic filling.  3. There is septal-apical hypo- to akinesis and inferobasal hypokinesis.  4. The left atrium is moderately dilated.  5. Mild to moderate mitral valve regurgitation.  6. Moderately elevated right ventricular systolic pressure.  7. There is no evidence of a patent foramen ovale.      5/2/2022 Myocardial perfusion scan  IMPRESSION:  1. Limited territory of prior largely completed myocardial  infarction involving the apex, associated with only minimal  carl-infarct ischemia.  2. Otherwise grossly normal perfusion preserved throughout the  remainder of the LV myocardium without evidence of additional  territory of ischemia or prior infarction.  3. The left ventricle is at the upper limits of normal in size.  4. Gated images demonstrate mild apical hypokinesis in the territory  of prior infarction with otherwise preserved normal LV wall motion  with an LV EF estimated at 47-51%.        Past Medical History  Past Medical History:   Diagnosis Date    Adhesive capsulitis of left shoulder 08/04/2022    Adhesive capsulitis of left shoulder    Personal history of other benign neoplasm     History of other benign neoplasm       Surgical History  Past  Surgical History:   Procedure Laterality Date    BI MAMMO GUIDED LOCALIZATION BREAST LEFT Left 4/8/2019    BI MAMMO GUIDED LOCALIZATION BREAST LEFT 4/8/2019 [facility]    BI MAMMO GUIDED LOCALIZATION BREAST RIGHT Right 11/3/2017    BI MAMMO GUIDED LOCALIZATION BREAST RIGHT 11/3/2017 [facility] [place] [facility]    BREAST LUMPECTOMY  11/17/2014    Breast Surgery Lumpectomy    COLON SURGERY  07/11/2017    Colon Surgery    COLONOSCOPY  09/09/2015    Complete Colonoscopy    CT ANGIO CORONARY ART WITH HEARTFLOW IF SCORE >30%  1/30/2020    CT HEART CORONARY ANGIOGRAM 1/30/2020 [facility] [place] [facility]    CT HEAD ANGIO W AND WO IV CONTRAST  3/31/2022    CT HEAD ANGIO W AND WO IV CONTRAST 3/31/2022 [facility] CL[facility]ICAL [facility]    CT NECK ANGIO W AND WO IV CONTRAST  3/31/2022    CT NECK ANGIO W AND WO IV CONTRAST 3/31/2022 [facility] CL[facility]ICAL [facility]    MR HEAD ANGIO WO IV CONTRAST  3/30/2022    MR HEAD ANGIO WO IV CONTRAST 3/30/2022 [facility] CL[facility]ICAL [facility]    MR NECK ANGIO WO IV CONTRAST  3/30/2022    MR NECK ANGIO WO IV CONTRAST 3/30/2022 [facility] CL[facility]ICAL [facility]    OTHER SURGICAL HISTORY  07/11/2017    Hysterectomy Total, With Removal Of Both Tubes And Both Ovaries       Social History  She reports that she quit smoking about 22 months ago. Her smoking use included cigarettes. She has never used smokeless tobacco. No history on file for alcohol use and drug use.     Allergies  Patient has no known allergies.    Physical Exam   Constitutional: No acute distress, resting comfortably in bed, cooperative  HEENT: Normocephalic, atraumatic, PERRLA, EOMI, moist mucous membranes, no pharyngeal erythema  Cardiovascular: RRR, normal S1/S2, no murmurs noted  Pulmonary: Clear to auscultation b/l, no wheezes/crackles/rhonchi, no increased work of breathing, no supplemental oxygen  GI: Soft, tender in the RT lower quadrant, CVA tenderness, non-distended, normoactive bowel sounds  : No dubon catheter  Lower extremities: Warm and well perfused, 1+extremity  edema  Skin: No rash in the LE suggestive of livedo reticularis, mild erythema at the lower back  Neuro: A&O x3, able to move all 4 extremities spontaneously, power on the Rt side <Lt, (rt hemiparesis) sensation is intact, no tenderness in the spine  Psych: Appropriate mood and affect          Assessment/Plan   78-year-old female with past medical history of CVA (eliquis), GERD, gastritis, BPPV, COPD/chronic bronchitis(not on inhalers), paroxysmal AF, diverticulitis, breast cancer, HTN, HLD, and CHF (EF 45%) that presents today for Rt sided non-colicky flank pain.Vitals stable with tenderness in the RT CVA, Labs with no leucoytosis, hematuria, neg UA. CT AP with renal infarct and patent renal artery and veins. Pts Clinical presentation unlikely renal colic (as pt pain not colicky, has no hematuria,)Pyelonephritis unlikely as pt afebrile and with no leucocytosis. Renal infarction likely thrombo embolic iso hcx of paroxysmal AF and questionable compliance to eliquis will do an Echo to r/o thrombus in the heart, screen for hypercoagulable condition, control pain and continue anticoagulation.  Pt may benefit from IR or vascular surgery recs however does not meet criteria for revascularisation. Pts radicular pain likely due to stenosis/ degenerative lesion in L/T region.    ##Renal infarct  #Pyelonephritis or Nephrolithiasis unlikely  #Hx of paroxysmal AF  -will do Echo (TTE/ELA) in am, antiphospholid screen, Protein C and S, antithrombin to determine aetiology of infarct.  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   :: Not on any inhalers.   :: Former smoker  Plan:  Will monitor      #Cervical myelopathy   #Claudication   #Degenerative lesions in Lumbar and thoracic region   ::S/p C3-4, C4-5, C5-6 Anterior Cervical Discectomy and Fusion with neurosurgeon, Dr. Major, on 6/15/2020\  pLAN:  -Can consider MRI of the Lumbar and thoracic region  -continue Duloxetine 30mg daily  -c/w other pain meds, can consider mm relaxant if pain not improving     #Ductal carcinoma in situ (DCIS) of left breast   ::s/p left breast mastectomy on 5/22/19.     ::Follows with breast surgery  Stable      #Urothelial carcinoma of bladder    -S/p TURBT and chemotherapy  Plan:   -Follows with Urologist, Dr. Bella.        #Insomnia  #possible EVA   -trazodone 50 mg at bedtime  -sleep study results pending    #Benign paroxysmal positional vertigo of right ear   Meclizine prn  Follows up with Physical Therapy      F : PRN  E: PRN  N: NPO   A: PIV   DVT Prophylaxis: Apixaban    Code Status: Full Code (confirmed on admission)   NOK:  Primary Emergency Contact: Kayley Penny, Home Phone: [phone]       DANIELLE HUYNH MD  Internal Medicine, PGY-2

## 2023-11-13 NOTE — PROGRESS NOTES
Jazzy Schaeffer is a 78 y.o. female on day 1 of admission presenting with Renal infarction (CMS/HCC).    Subjective   No acute events overnight. Pt noting improvement in R flank pain and pain no longer radiating to legs. Pt denying nausea, vomiting, fevers/chills, urinary symptoms, chest pain, SOB.       Objective     Physical Exam  Constitutional:       General: She is not in acute distress.     Appearance: Normal appearance. She is normal weight.   HENT:      Head: Normocephalic and atraumatic.      Right Ear: Tympanic membrane normal.      Left Ear: Tympanic membrane normal.      Nose: Nose normal.      Mouth/Throat:      Mouth: Mucous membranes are moist.      Pharynx: Oropharynx is clear.   Eyes:      Extraocular Movements: Extraocular movements intact.      Conjunctiva/sclera: Conjunctivae normal.      Pupils: Pupils are equal, round, and reactive to light.   Cardiovascular:      Rate and Rhythm: Normal rate and regular rhythm.      Pulses: Normal pulses.      Heart sounds: Normal heart sounds. No murmur heard.     No friction rub. No gallop.   Pulmonary:      Effort: Pulmonary effort is normal. No respiratory distress.      Breath sounds: Normal breath sounds. No wheezing, rhonchi or rales.   Chest:      Chest wall: No tenderness.   Abdominal:      General: Abdomen is flat. Bowel sounds are normal. There is no distension.      Palpations: Abdomen is soft. There is no mass.      Tenderness: There is no abdominal tenderness. There is no right CVA tenderness, left CVA tenderness, guarding or rebound.      Hernia: No hernia is present.   Musculoskeletal:         General: Normal range of motion.   Skin:     General: Skin is warm and dry.      Capillary Refill: Capillary refill takes less than 2 seconds.   Neurological:      General: No focal deficit present.      Mental Status: She is alert and oriented to person, place, and time. Mental status is at baseline.   Psychiatric:         Mood and Affect: Mood normal.  "        Behavior: Behavior normal.         Thought Content: Thought content normal.         Judgment: Judgment normal.         Last Recorded Vitals  Blood pressure 134/81, pulse 78, temperature 37.4 °C (99.3 °F), temperature source Oral, resp. rate 14, height 1.651 m (5' 5\"), weight 83.9 kg (185 lb), SpO2 94 %.  Intake/Output last 3 Shifts:  No intake/output data recorded.    Relevant Results  Scheduled medications  acetaminophen, 975 mg, oral, q8h  amLODIPine, 5 mg, oral, Daily  aspirin, 81 mg, oral, Daily  furosemide, 20 mg, oral, Every Mon/Wed/Fri  losartan, 100 mg, oral, Daily  metoprolol tartrate, 25 mg, oral, BID  polyethylene glycol, 17 g, oral, Daily  rosuvastatin, 5 mg, oral, Nightly      Continuous medications  heparin, 0-4,000 Units/hr, Last Rate: 1,000 Units/hr (11/13/23 1213)      PRN medications  PRN medications: heparin, meclizine, ondansetron, oxyCODONE, traZODone    Results for orders placed or performed during the hospital encounter of 11/12/23 (from the past 24 hour(s))   Urinalysis with Reflex Microscopic and Culture   Result Value Ref Range    Color, Urine Yellow Straw, Yellow    Appearance, Urine Clear Clear    Specific Gravity, Urine 1.011 1.005 - 1.035    pH, Urine 7.0 5.0, 5.5, 6.0, 6.5, 7.0, 7.5, 8.0    Protein, Urine 30 (1+) (N) NEGATIVE mg/dL    Glucose, Urine NEGATIVE NEGATIVE mg/dL    Blood, Urine NEGATIVE NEGATIVE    Ketones, Urine NEGATIVE NEGATIVE mg/dL    Bilirubin, Urine NEGATIVE NEGATIVE    Urobilinogen, Urine 2.0 (N) <2.0 mg/dL    Nitrite, Urine NEGATIVE NEGATIVE    Leukocyte Esterase, Urine NEGATIVE NEGATIVE   Extra Urine Gray Tube   Result Value Ref Range    Extra Tube Hold for add-ons.    Urinalysis Microscopic   Result Value Ref Range    WBC, Urine 1-5 1-5, NONE /HPF    RBC, Urine 1-2 NONE, 1-2, 3-5 /HPF    Squamous Epithelial Cells, Urine 1-9 (SPARSE) Reference range not established. /HPF   CBC and Auto Differential   Result Value Ref Range    WBC 7.0 4.4 - 11.3 x10*3/uL    " nRBC 0.0 0.0 - 0.0 /100 WBCs    RBC 5.09 4.00 - 5.20 x10*6/uL    Hemoglobin 13.2 12.0 - 16.0 g/dL    Hematocrit 42.0 36.0 - 46.0 %    MCV 83 80 - 100 fL    MCH 25.9 (L) 26.0 - 34.0 pg    MCHC 31.4 (L) 32.0 - 36.0 g/dL    RDW 14.6 (H) 11.5 - 14.5 %    Platelets 252 150 - 450 x10*3/uL    Neutrophils % 63.7 40.0 - 80.0 %    Immature Granulocytes %, Automated 0.3 0.0 - 0.9 %    Lymphocytes % 23.3 13.0 - 44.0 %    Monocytes % 10.6 2.0 - 10.0 %    Eosinophils % 1.7 0.0 - 6.0 %    Basophils % 0.4 0.0 - 2.0 %    Neutrophils Absolute 4.42 1.60 - 5.50 x10*3/uL    Immature Granulocytes Absolute, Automated 0.02 0.00 - 0.50 x10*3/uL    Lymphocytes Absolute 1.62 0.80 - 3.00 x10*3/uL    Monocytes Absolute 0.74 0.05 - 0.80 x10*3/uL    Eosinophils Absolute 0.12 0.00 - 0.40 x10*3/uL    Basophils Absolute 0.03 0.00 - 0.10 x10*3/uL   Comprehensive metabolic panel   Result Value Ref Range    Glucose 85 74 - 99 mg/dL    Sodium 139 136 - 145 mmol/L    Potassium 4.2 3.5 - 5.3 mmol/L    Chloride 103 98 - 107 mmol/L    Bicarbonate 24 21 - 32 mmol/L    Anion Gap 16 10 - 20 mmol/L    Urea Nitrogen 10 6 - 23 mg/dL    Creatinine 1.08 (H) 0.50 - 1.05 mg/dL    eGFR 53 (L) >60 mL/min/1.73m*2    Calcium 9.2 8.6 - 10.6 mg/dL    Albumin 4.1 3.4 - 5.0 g/dL    Alkaline Phosphatase 74 33 - 136 U/L    Total Protein 6.9 6.4 - 8.2 g/dL    AST 79 (H) 9 - 39 U/L    Bilirubin, Total 0.6 0.0 - 1.2 mg/dL    ALT 53 (H) 7 - 45 U/L   Lipase   Result Value Ref Range    Lipase 10 9 - 82 U/L   Troponin I, High Sensitivity   Result Value Ref Range    Troponin I, High Sensitivity 416 (HH) 0 - 34 ng/L   Light Blue Top   Result Value Ref Range    Extra Tube Hold for add-ons.    Green Top   Result Value Ref Range    Extra Tube Hold for add-ons.    B-type natriuretic peptide   Result Value Ref Range     (H) 0 - 99 pg/mL   Troponin I, High Sensitivity   Result Value Ref Range    Troponin I, High Sensitivity 385 (HH) 0 - 34 ng/L   Hemoglobin A1C   Result Value Ref  Range    Hemoglobin A1C 5.6 see below %    Estimated Average Glucose 114 Not Established mg/dL   Lupus Anticoagulant With Interpretation [RAMOS]   Result Value Ref Range    DRVVT Screen 0.91 RATIO    DRVVT Confirmation 1.61 RATIO    DRVVT Test Ratio 0.57 <=1.20 RATIO    SCT Screen 0.51 RATIO    SCT Confirmation 0.97 RATIO    SCT Test Ratio 0.53 <=1.16 RATIO    Lupus Anticoagulant Interpretation       No evidence of lupus anticoagulant in these assays (DRVVT and Silica Clotting Time (SCT)). Assay interferences may occur in the presence of factor deficiency/inhibitor and/or anticoagulants. For patients on anti-Vitamin K therapy, repeating DRVVT testing might be indicated when the patient is off anti-vitamin K therapy. The DRVVT assay contains a heparin neutralizer up to 1.0 U/mL. Higher concentrations of heparin may cause interferences. SCT results are not affected by UF heparin up to 0.5 U/mL and LMW Heparin up to 1.0 U/mL. Higher concentrations of heparin may cause interferences. Correlation with clinical findings and clinical history is necessary to assess significance of results in an individual patient.   CBC   Result Value Ref Range    WBC 8.6 4.4 - 11.3 x10*3/uL    nRBC 0.0 0.0 - 0.0 /100 WBCs    RBC 4.82 4.00 - 5.20 x10*6/uL    Hemoglobin 12.5 12.0 - 16.0 g/dL    Hematocrit 39.4 36.0 - 46.0 %    MCV 82 80 - 100 fL    MCH 25.9 (L) 26.0 - 34.0 pg    MCHC 31.7 (L) 32.0 - 36.0 g/dL    RDW 14.4 11.5 - 14.5 %    Platelets 216 150 - 450 x10*3/uL   Comprehensive metabolic panel   Result Value Ref Range    Glucose 99 74 - 99 mg/dL    Sodium 141 136 - 145 mmol/L    Potassium 3.5 3.5 - 5.3 mmol/L    Chloride 102 98 - 107 mmol/L    Bicarbonate 30 21 - 32 mmol/L    Anion Gap 13 10 - 20 mmol/L    Urea Nitrogen 13 6 - 23 mg/dL    Creatinine 1.25 (H) 0.50 - 1.05 mg/dL    eGFR 45 (L) >60 mL/min/1.73m*2    Calcium 9.2 8.6 - 10.6 mg/dL    Albumin 4.0 3.4 - 5.0 g/dL    Alkaline Phosphatase 69 33 - 136 U/L    Total Protein 6.7 6.4 -  8.2 g/dL    AST 81 (H) 9 - 39 U/L    Bilirubin, Total 0.6 0.0 - 1.2 mg/dL    ALT 79 (H) 7 - 45 U/L   Lactate dehydrogenase   Result Value Ref Range     (H) 84 - 246 U/L   Light Blue Top   Result Value Ref Range    Extra Tube Hold for add-ons.    APTT   Result Value Ref Range    aPTT 33 27 - 38 seconds     CT abdomen pelvis w IV contrast   Final Result   1.  Interval development of large wedge-shaped hypoenhancing areas in   the mid to inferior portions of the right kidney with no significant   associated fat stranding or urothelial thickening. Findings   concerning for right renal infarction. Pyelonephritis is a   possibility, for correlation with urinalysis. The right renal artery   and vein appear patent within the limits of the current exam.   2. Small hiatal hernia. Apparent gastric wall thickening which can be   secondary to nondistention versus mild gastritis.   3. Additional chronic findings are as described above.        I personally reviewed the images/study and I agree with the findings   as stated by Resident Sebastien Xiong MD. This study was interpreted   at [place], [place].        MACRO:   None        Signed by: Valeri Greenfield 11/12/2023 6:15 PM   Dictation workstation:   [id number]      XR chest 2 views   Final Result   No acute cardiopulmonary process.   Signed by Lalo Garduno MD      Transthoracic Echo (TTE) Complete    (Results Pending)           Assessment/Plan   Principal Problem:    Renal infarction (CMS/HCC)    78-year-old female with past medical history of CVA (eliquis), GERD, gastritis, BPPV, COPD/chronic bronchitis(not on inhalers), paroxysmal AF, diverticulitis, breast cancer, HTN, HLD, and CHF (EF 45%) that presents today for Rt sided non-colicky flank pain.Vitals stable with tenderness in the RT CVA, Labs with no leucoytosis, hematuria, neg UA. CT AP with renal infarct and patent renal artery and veins. Pts Clinical presentation  unlikely renal colic (as pt pain not colicky, has no hematuria,)Pyelonephritis unlikely as pt afebrile and with no leucocytosis. Renal infarction likely thrombo embolic iso hcx of paroxysmal AF and questionable compliance to eliquis vs. Possible hypercoagulable state.    11/13 Updates:  -Pending echo, APLS workup  -Eliquis held, start heparin gtt  -Nephrology consulted for renal infarct and NASIM, appreciate recs  -UA/Urine electrolytes sent  -Fu chronicity of infarct with radiology   -Renal duplex US ordered    ##Renal infarct  #Pyelonephritis or Nephrolithiasis unlikely  #Hx of paroxysmal AF  #NASIM  -Fu Echo (TTE/ELA) in am, antiphospholid screen, Protein C and S, antithrombin to determine aetiology of infarct.  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   :: Not on any inhalers.   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 Major, on 6/15/2020\  pLAN:  -Can consider MRI of the Lumbar and thoracic region  -Hold Duloxetine 30mg daily  -c/w other pain meds, can consider mm relaxant if pain not improving      #Ductal carcinoma in situ (DCIS) of left breast   ::s/p left breast  mastectomy on 5/22/19.     ::Follows with breast surgery  Stable        #Urothelial carcinoma of bladder    -S/p TURBT and chemotherapy  Plan:   -Follows with Urologist, Dr. Bella.          #Insomnia  #possible EVA   -trazodone 50 mg at bedtime  -sleep study results pending     #Benign paroxysmal positional vertigo of right ear           Meclizine prn  Follows up with Physical Therapy        F : PRN  E: PRN  N: Adult regular  A: PIV   DVT Prophylaxis: Heparin gtt     Code Status: Full Code (confirmed on admission)   NOK:  Primary Emergency Contact: Kayley Penny, Home Phone: [phone]           Jose Jones MD  PGY-1  [place] Pager [phone]

## 2023-11-13 NOTE — CONSULTS
NEPHROLOGY NEW CONSULT NOTE   Jazzy Schaeffer   78 y.o.      MRN/Room: [id number]    Reason for consult: NASIM, concern for right renal infarct    HPI:  Jazzy Schaeffer is a 78 y.o. 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 States that her pain began acutely last Thursday and reach 10/10 intensity over the week-end.     In ED, CT abdomen/pelvis with contrast performed showing wedge-shaped hypoechoic area in mid to inferior right kidney, with no evidence of perinephric fat stranding, urothelial thickening, or hydronephrosis. Right renal artery and vein patent.    Denies any fever/chills, vomiting, chest pain, SOB, hematuria, dysuria, or diarrhea. Does endorse recent cough and mild nausea with decreased intake over past several days. No leukocytosis. Patient states that she has been without her home Eliquis dosing for the past 3 days, and went 4-5 days without her Eliquis last month due to insurance issues.     Nephrology consulted for concern for renal infarct/management recommendations.    Pertinent renal history:   -Baseline creatinine is 0.6-0.7  -Creatinine at the time of admission was 1.08, now up to 1.25 this morning  -Blood pressure mildly elevated (SBP 160s) during hospitalization  -Urine output has not been recorded over past 24 hours.  -Recent contrast studies- CT abdomen/pelvis with contrast on 11/12  -Recent nephrotoxic medication use (vanco/vanco+zosyn, amphotericin B, gentamicin, chemo drugs, NSAIDS): received 15 mg toradol in the ED, 20 mg IV Lasix d/t concern for CHF exacerbation/elevated BNP. Continued on home lasix per primary team  "today.  -Recent ACE/ARB/diuretic use: on losartan 100 mg daily, not held when patient was admitted  -Does have history of prior renal stones and hematuria, underwent CT urography and cystoscopy in 2021 that showed urothelial carcinoma with extensive squamous differentiation, s/p TUBR 4/2021   -Not on PPI  -Former smoker, quit about 1 year ago. Denies alcohol or recreational drug use    In The ER: /81 (BP Location: Right arm, Patient Position: Lying)   Pulse 78   Temp 37.4 °C (99.3 °F) (Oral)   Resp 14   Ht 1.651 m (5' 5\")   Wt 83.9 kg (185 lb)   SpO2 94%   BMI 30.79 kg/m²      Past Medical History:   Diagnosis Date    Adhesive capsulitis of left shoulder 08/04/2022    Adhesive capsulitis of left shoulder    Personal history of other benign neoplasm     History of other benign neoplasm      Past Surgical History:   Procedure Laterality Date    BI MAMMO GUIDED LOCALIZATION BREAST LEFT Left 4/8/2019    BI MAMMO GUIDED LOCALIZATION BREAST LEFT 4/8/2019 [facility] SURG AIB LEGACY    BI MAMMO GUIDED LOCALIZATION BREAST RIGHT Right 11/3/2017    BI MAMMO GUIDED LOCALIZATION BREAST RIGHT 11/3/2017 [facility] SURG AIB LEGACY    BREAST LUMPECTOMY  11/17/2014    Breast Surgery Lumpectomy    COLON SURGERY  07/11/2017    Colon Surgery    COLONOSCOPY  09/09/2015    Complete Colonoscopy    CT ANGIO CORONARY ART WITH HEARTFLOW IF SCORE >30%  1/30/2020    CT HEART CORONARY ANGIOGRAM 1/30/2020 [facility] EMERGENCY LEGACY    CT ANGIO NECK W  3/31/2022    CT NECK ANGIO W AND WO IV CONTRAST 3/31/2022 [facility] CLINICAL LEGACY    CT HEAD ANGIO W AND WO IV CONTRAST  3/31/2022    CT HEAD ANGIO W AND WO IV CONTRAST 3/31/2022 [facility] CLINICAL LEGACY    MR HEAD ANGIO WO IV CONTRAST  3/30/2022    MR HEAD ANGIO WO IV CONTRAST 3/30/2022 [facility] CLINICAL LEGACY    MR NECK ANGIO WO IV CONTRAST  3/30/2022    MR NECK ANGIO WO IV CONTRAST 3/30/2022 [facility] CLINICAL LEGACY    OTHER SURGICAL HISTORY  07/11/2017    Hysterectomy Total, With Removal Of Both Tubes And Both " Ovaries      No family history on file.  Social History     Socioeconomic History    Marital status:      Spouse name: Not on file    Number of children: Not on file    Years of education: Not on file    Highest education level: Not on file   Occupational History    Not on file   Tobacco Use    Smoking status: Former     Types: Cigarettes     Quit date:      Years since quittin.8    Smokeless tobacco: Never   Substance and Sexual Activity    Alcohol use: Not on file    Drug use: Not on file    Sexual activity: Not on file   Other Topics Concern    Not on file   Social History Narrative    Not on file     Social Determinants of Health     Financial Resource Strain: Not on file   Food Insecurity: Not on file   Transportation Needs: Not on file   Physical Activity: Not on file   Stress: Not on file   Social Connections: Not on file   Intimate Partner Violence: Not on file   Housing Stability: Not on file       No Known Allergies     Meds:   acetaminophen, 975 mg, q8h  amLODIPine, 5 mg, Daily  aspirin, 81 mg, Daily  furosemide, 20 mg, Every Mon/Wed/Fri  losartan, 100 mg, Daily  metoprolol tartrate, 25 mg, BID  polyethylene glycol, 17 g, Daily  rosuvastatin, 5 mg, Nightly      heparin, Last Rate: 1,000 Units/hr (23 1213)      heparin, 2,000-4,000 Units, q4h PRN  meclizine, 12.5 mg, TID PRN  ondansetron, 4 mg, q8h PRN  oxyCODONE, 5 mg, q6h PRN  traZODone, 50 mg, Nightly PRN      Vitals:    23 0808   BP: 134/81   Pulse: 78   Resp: 14   Temp: 37.4 °C (99.3 °F)   SpO2: 94%        No intake/output data recorded.     Physical Exam:  General appearance: AAOx3. No distress  Eyes: non-icteric  Skin: no apparent rash  Heart: regular rate, irregular rhythm. No rub or murmur appreciated  Lungs: CTA bilat.  No wheezing/crackles appreciated  Abdomen: soft, nt/nd  Extremities: No edema bilat  : No Garza  Neuro: Alert & oriented x4, cranial nerves grossly intact   ACCESS: pIV       Blood Labs:  Results for  orders placed or performed during the hospital encounter of 11/12/23 (from the past 24 hour(s))   Urinalysis with Reflex Microscopic and Culture   Result Value Ref Range    Color, Urine Yellow Straw, Yellow    Appearance, Urine Clear Clear    Specific Gravity, Urine 1.011 1.005 - 1.035    pH, Urine 7.0 5.0, 5.5, 6.0, 6.5, 7.0, 7.5, 8.0    Protein, Urine 30 (1+) (N) NEGATIVE mg/dL    Glucose, Urine NEGATIVE NEGATIVE mg/dL    Blood, Urine NEGATIVE NEGATIVE    Ketones, Urine NEGATIVE NEGATIVE mg/dL    Bilirubin, Urine NEGATIVE NEGATIVE    Urobilinogen, Urine 2.0 (N) <2.0 mg/dL    Nitrite, Urine NEGATIVE NEGATIVE    Leukocyte Esterase, Urine NEGATIVE NEGATIVE   Extra Urine Gray Tube   Result Value Ref Range    Extra Tube Hold for add-ons.    Urinalysis Microscopic   Result Value Ref Range    WBC, Urine 1-5 1-5, NONE /HPF    RBC, Urine 1-2 NONE, 1-2, 3-5 /HPF    Squamous Epithelial Cells, Urine 1-9 (SPARSE) Reference range not established. /HPF   CBC and Auto Differential   Result Value Ref Range    WBC 7.0 4.4 - 11.3 x10*3/uL    nRBC 0.0 0.0 - 0.0 /100 WBCs    RBC 5.09 4.00 - 5.20 x10*6/uL    Hemoglobin 13.2 12.0 - 16.0 g/dL    Hematocrit 42.0 36.0 - 46.0 %    MCV 83 80 - 100 fL    MCH 25.9 (L) 26.0 - 34.0 pg    MCHC 31.4 (L) 32.0 - 36.0 g/dL    RDW 14.6 (H) 11.5 - 14.5 %    Platelets 252 150 - 450 x10*3/uL    Neutrophils % 63.7 40.0 - 80.0 %    Immature Granulocytes %, Automated 0.3 0.0 - 0.9 %    Lymphocytes % 23.3 13.0 - 44.0 %    Monocytes % 10.6 2.0 - 10.0 %    Eosinophils % 1.7 0.0 - 6.0 %    Basophils % 0.4 0.0 - 2.0 %    Neutrophils Absolute 4.42 1.60 - 5.50 x10*3/uL    Immature Granulocytes Absolute, Automated 0.02 0.00 - 0.50 x10*3/uL    Lymphocytes Absolute 1.62 0.80 - 3.00 x10*3/uL    Monocytes Absolute 0.74 0.05 - 0.80 x10*3/uL    Eosinophils Absolute 0.12 0.00 - 0.40 x10*3/uL    Basophils Absolute 0.03 0.00 - 0.10 x10*3/uL   Comprehensive metabolic panel   Result Value Ref Range    Glucose 85 74 - 99 mg/dL     Sodium 139 136 - 145 mmol/L    Potassium 4.2 3.5 - 5.3 mmol/L    Chloride 103 98 - 107 mmol/L    Bicarbonate 24 21 - 32 mmol/L    Anion Gap 16 10 - 20 mmol/L    Urea Nitrogen 10 6 - 23 mg/dL    Creatinine 1.08 (H) 0.50 - 1.05 mg/dL    eGFR 53 (L) >60 mL/min/1.73m*2    Calcium 9.2 8.6 - 10.6 mg/dL    Albumin 4.1 3.4 - 5.0 g/dL    Alkaline Phosphatase 74 33 - 136 U/L    Total Protein 6.9 6.4 - 8.2 g/dL    AST 79 (H) 9 - 39 U/L    Bilirubin, Total 0.6 0.0 - 1.2 mg/dL    ALT 53 (H) 7 - 45 U/L   Lipase   Result Value Ref Range    Lipase 10 9 - 82 U/L   Troponin I, High Sensitivity   Result Value Ref Range    Troponin I, High Sensitivity 416 (HH) 0 - 34 ng/L   Light Blue Top   Result Value Ref Range    Extra Tube Hold for add-ons.    Green Top   Result Value Ref Range    Extra Tube Hold for add-ons.    B-type natriuretic peptide   Result Value Ref Range     (H) 0 - 99 pg/mL   Troponin I, High Sensitivity   Result Value Ref Range    Troponin I, High Sensitivity 385 (HH) 0 - 34 ng/L   Hemoglobin A1C   Result Value Ref Range    Hemoglobin A1C 5.6 see below %    Estimated Average Glucose 114 Not Established mg/dL   Lupus Anticoagulant With Interpretation [RAMOS]   Result Value Ref Range    DRVVT Screen 0.91 RATIO    DRVVT Confirmation 1.61 RATIO    DRVVT Test Ratio 0.57 <=1.20 RATIO    SCT Screen 0.51 RATIO    SCT Confirmation 0.97 RATIO    SCT Test Ratio 0.53 <=1.16 RATIO    Lupus Anticoagulant Interpretation       No evidence of lupus anticoagulant in these assays (DRVVT and Silica Clotting Time (SCT)). Assay interferences may occur in the presence of factor deficiency/inhibitor and/or anticoagulants. For patients on anti-Vitamin K therapy, repeating DRVVT testing might be indicated when the patient is off anti-vitamin K therapy. The DRVVT assay contains a heparin neutralizer up to 1.0 U/mL. Higher concentrations of heparin may cause interferences. SCT results are not affected by UF heparin up to 0.5 U/mL and LMW  Heparin up to 1.0 U/mL. Higher concentrations of heparin may cause interferences. Correlation with clinical findings and clinical history is necessary to assess significance of results in an individual patient.   CBC   Result Value Ref Range    WBC 8.6 4.4 - 11.3 x10*3/uL    nRBC 0.0 0.0 - 0.0 /100 WBCs    RBC 4.82 4.00 - 5.20 x10*6/uL    Hemoglobin 12.5 12.0 - 16.0 g/dL    Hematocrit 39.4 36.0 - 46.0 %    MCV 82 80 - 100 fL    MCH 25.9 (L) 26.0 - 34.0 pg    MCHC 31.7 (L) 32.0 - 36.0 g/dL    RDW 14.4 11.5 - 14.5 %    Platelets 216 150 - 450 x10*3/uL   Comprehensive metabolic panel   Result Value Ref Range    Glucose 99 74 - 99 mg/dL    Sodium 141 136 - 145 mmol/L    Potassium 3.5 3.5 - 5.3 mmol/L    Chloride 102 98 - 107 mmol/L    Bicarbonate 30 21 - 32 mmol/L    Anion Gap 13 10 - 20 mmol/L    Urea Nitrogen 13 6 - 23 mg/dL    Creatinine 1.25 (H) 0.50 - 1.05 mg/dL    eGFR 45 (L) >60 mL/min/1.73m*2    Calcium 9.2 8.6 - 10.6 mg/dL    Albumin 4.0 3.4 - 5.0 g/dL    Alkaline Phosphatase 69 33 - 136 U/L    Total Protein 6.7 6.4 - 8.2 g/dL    AST 81 (H) 9 - 39 U/L    Bilirubin, Total 0.6 0.0 - 1.2 mg/dL    ALT 79 (H) 7 - 45 U/L   Lactate dehydrogenase   Result Value Ref Range     (H) 84 - 246 U/L   Light Blue Top   Result Value Ref Range    Extra Tube Hold for add-ons.    APTT   Result Value Ref Range    aPTT 33 27 - 38 seconds         ASSESSMENT:  Jazzy Schaeffer is a  78 y.o.  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 Found to have evidence of possible right renal infarct vs pyelonephritis on CT, but with patent right renal artery and vein.     #NASIM stage II  #Right renal infarct  UA relatively bland and  patient without leukocytosis or systemic symptoms of infection. Agree with primary team that there is a very low likelihood of pyelonephritis/UTI.    Do agree that patient's picture is consistent with renal infarct given prior history of atheroembolic stroke and missed Eliquis dosing, though management would only involve anticoagulation and supportive management of pain. Do not recommend any further imaging/contrast loading at this time.    Hard to say that patient had a real failure of Eliquis since she has missed dosing for several days twice in the past month. Will defer anticoagulation to primary team.    Regarding etiology of patient's NASIM, an infarction can certainly contribute to a mild rise in creatinine through an ischemic ATN picture. Would expect a mild uptrend in creatinine for a few days after the insult. Patient's picture may be additionally influenced by poor PO intake, CT contrast load, and torsemide/NSAID exposure.     Agree with primary team's plan to get Echo and urine electrolytes. No contraindication to getting renal U/S but unlikely to provide     Recommendations:  -Agree with urine electrolytes per primary team  -Conservative management of possible infarct with anticoagulation and pain control. No additional imaging recommended at this time  -Mild proteinuria on UA. Recommend checking urine total protein/creatine ratio   -Etiology: likely ischemic ATN 2/2 renal infarct, compounded by NSAID use and contrast load   -Consider holding home lasix and losartan while NASIM resolves  - Indication for dialysis:  No   - Avoid nephrotoxins, contrast if possible  - strict Is/Os  - Renal dosing for medications for latest eGFR, follow medication trough as appropriate  - Avoid hypotension/rapid fluctuations in BPs    Madyson Staley MD  Nephrology Resident  24 hour Renal Pager - [phone]    Staffed with attending nephrologist

## 2023-11-13 NOTE — NURSING NOTE
Patient arrived to unit.  Heparin running at 10ml/hr.  Belongings with patient.  PT oriented to room, call light function, and POC.

## 2023-11-14 ENCOUNTER — APPOINTMENT (OUTPATIENT)
Dept: CARDIOLOGY | Facility: HOSPITAL | Age: 78
DRG: 698 | End: 2023-11-14
Payer: MEDICARE

## 2023-11-14 LAB
ALBUMIN SERPL BCP-MCNC: 3.1 G/DL (ref 3.4–5)
ANION GAP SERPL CALC-SCNC: 12 MMOL/L (ref 10–20)
AORTIC VALVE PEAK VELOCITY: 1.25
APPEARANCE UR: CLEAR
AT III ACT/NOR PPP CHRO: 107 % ACTIVITY (ref 80–130)
AV PEAK GRADIENT: 6.3
AVA (PEAK VEL): 2.25
BASOPHILS # BLD AUTO: 0.03 X10*3/UL (ref 0–0.1)
BASOPHILS NFR BLD AUTO: 0.2 %
BILIRUB UR STRIP.AUTO-MCNC: NEGATIVE MG/DL
BUN SERPL-MCNC: 20 MG/DL (ref 6–23)
CALCIUM SERPL-MCNC: 8.5 MG/DL (ref 8.6–10.6)
CHLORIDE SERPL-SCNC: 101 MMOL/L (ref 98–107)
CHLORIDE UR-SCNC: 20 MMOL/L
CHLORIDE/CREATININE (MMOL/G) IN URINE: 9 MMOL/G CREAT (ref 38–318)
CO2 SERPL-SCNC: 30 MMOL/L (ref 21–32)
COLOR UR: ABNORMAL
CREAT SERPL-MCNC: 1.42 MG/DL (ref 0.5–1.05)
CREAT UR-MCNC: 212.5 MG/DL (ref 20–320)
CREAT UR-MCNC: 212.5 MG/DL (ref 20–320)
CREAT UR-MCNC: 217.7 MG/DL (ref 20–320)
EJECTION FRACTION APICAL 4 CHAMBER: 46.2
EJECTION FRACTION: 46
EOSINOPHIL # BLD AUTO: 0.03 X10*3/UL (ref 0–0.4)
EOSINOPHIL NFR BLD AUTO: 0.2 %
ERYTHROCYTE [DISTWIDTH] IN BLOOD BY AUTOMATED COUNT: 14.6 % (ref 11.5–14.5)
GFR SERPL CREATININE-BSD FRML MDRD: 38 ML/MIN/1.73M*2
GLUCOSE SERPL-MCNC: 151 MG/DL (ref 74–99)
GLUCOSE UR STRIP.AUTO-MCNC: NEGATIVE MG/DL
HCT VFR BLD AUTO: 37.9 % (ref 36–46)
HGB BLD-MCNC: 11.7 G/DL (ref 12–16)
HOLD SPECIMEN: NORMAL
IMM GRANULOCYTES # BLD AUTO: 0.07 X10*3/UL (ref 0–0.5)
IMM GRANULOCYTES NFR BLD AUTO: 0.5 % (ref 0–0.9)
KETONES UR STRIP.AUTO-MCNC: NEGATIVE MG/DL
LEFT ATRIUM VOLUME AREA LENGTH INDEX BSA: 72.2
LEFT VENTRICLE INTERNAL DIMENSION DIASTOLE: 5.1 (ref 3.5–6)
LEFT VENTRICULAR OUTFLOW TRACT DIAMETER: 2
LEUKOCYTE ESTERASE UR QL STRIP.AUTO: NEGATIVE
LYMPHOCYTES # BLD AUTO: 1.51 X10*3/UL (ref 0.8–3)
LYMPHOCYTES NFR BLD AUTO: 10.9 %
MAGNESIUM SERPL-MCNC: 1.85 MG/DL (ref 1.6–2.4)
MCH RBC QN AUTO: 25.5 PG (ref 26–34)
MCHC RBC AUTO-ENTMCNC: 30.9 G/DL (ref 32–36)
MCV RBC AUTO: 83 FL (ref 80–100)
MITRAL VALVE E/A RATIO: 2.84
MITRAL VALVE E/E' RATIO: 28.44
MONOCYTES # BLD AUTO: 0.97 X10*3/UL (ref 0.05–0.8)
MONOCYTES NFR BLD AUTO: 7 %
NEUTROPHILS # BLD AUTO: 11.26 X10*3/UL (ref 1.6–5.5)
NEUTROPHILS NFR BLD AUTO: 81.2 %
NITRITE UR QL STRIP.AUTO: NEGATIVE
NRBC BLD-RTO: 0 /100 WBCS (ref 0–0)
PH UR STRIP.AUTO: 5 [PH]
PHOSPHATE SERPL-MCNC: 3.3 MG/DL (ref 2.5–4.9)
PLATELET # BLD AUTO: 211 X10*3/UL (ref 150–450)
POTASSIUM SERPL-SCNC: 3.3 MMOL/L (ref 3.5–5.3)
POTASSIUM UR-SCNC: 75 MMOL/L
POTASSIUM/CREAT UR-RTO: 35 MMOL/G CREAT
PROT C ACT/NOR PPP CHRO: 98 % ACTIVITY (ref 70–150)
PROT S ACT/NOR PPP: 130 % ACTIVITY (ref 64–150)
PROT UR STRIP.AUTO-MCNC: ABNORMAL MG/DL
PROT UR-ACNC: 99 MG/DL (ref 5–24)
PROT/CREAT UR: 0.47 MG/MG CREAT (ref 0–0.17)
RBC # BLD AUTO: 4.58 X10*6/UL (ref 4–5.2)
RBC # UR STRIP.AUTO: ABNORMAL /UL
RBC #/AREA URNS AUTO: >20 /HPF
RIGHT VENTRICLE FREE WALL PEAK S': 12.3
RIGHT VENTRICLE PEAK SYSTOLIC PRESSURE: 37.3
SODIUM SERPL-SCNC: 140 MMOL/L (ref 136–145)
SODIUM UR-SCNC: 56 MMOL/L
SODIUM/CREAT UR-RTO: 26 MMOL/G CREAT
SP GR UR STRIP.AUTO: 1.03
SQUAMOUS #/AREA URNS AUTO: ABNORMAL /HPF
TRICUSPID ANNULAR PLANE SYSTOLIC EXCURSION: 1.7
UFH PPP CHRO-ACNC: 0.5 IU/ML
UFH PPP CHRO-ACNC: 0.8 IU/ML
UFH PPP CHRO-ACNC: 1 IU/ML
UFH PPP CHRO-ACNC: 1.2 IU/ML
UFH PPP CHRO-ACNC: 1.4 IU/ML
UFH PPP CHRO-ACNC: 1.9 IU/ML
UREA/CREAT UR-SRTO: 5 G/G CREAT
UROBILINOGEN UR STRIP.AUTO-MCNC: 4 MG/DL
UUN UR-MCNC: 1094 MG/DL
WBC # BLD AUTO: 13.9 X10*3/UL (ref 4.4–11.3)
WBC #/AREA URNS AUTO: ABNORMAL /HPF

## 2023-11-14 PROCEDURE — 85520 HEPARIN ASSAY: CPT

## 2023-11-14 PROCEDURE — 2500000004 HC RX 250 GENERAL PHARMACY W/ HCPCS (ALT 636 FOR OP/ED)

## 2023-11-14 PROCEDURE — 82570 ASSAY OF URINE CREATININE: CPT

## 2023-11-14 PROCEDURE — 87086 URINE CULTURE/COLONY COUNT: CPT

## 2023-11-14 PROCEDURE — 83735 ASSAY OF MAGNESIUM: CPT

## 2023-11-14 PROCEDURE — 81003 URINALYSIS AUTO W/O SCOPE: CPT

## 2023-11-14 PROCEDURE — 2500000001 HC RX 250 WO HCPCS SELF ADMINISTERED DRUGS (ALT 637 FOR MEDICARE OP)

## 2023-11-14 PROCEDURE — 1100000001 HC PRIVATE ROOM DAILY

## 2023-11-14 PROCEDURE — 84133 ASSAY OF URINE POTASSIUM: CPT

## 2023-11-14 PROCEDURE — 36415 COLL VENOUS BLD VENIPUNCTURE: CPT

## 2023-11-14 PROCEDURE — 93306 TTE W/DOPPLER COMPLETE: CPT

## 2023-11-14 PROCEDURE — 80069 RENAL FUNCTION PANEL: CPT

## 2023-11-14 PROCEDURE — 99233 SBSQ HOSP IP/OBS HIGH 50: CPT

## 2023-11-14 PROCEDURE — 93306 TTE W/DOPPLER COMPLETE: CPT | Performed by: INTERNAL MEDICINE

## 2023-11-14 PROCEDURE — 84540 ASSAY OF URINE/UREA-N: CPT

## 2023-11-14 PROCEDURE — 85025 COMPLETE CBC W/AUTO DIFF WBC: CPT

## 2023-11-14 RX ORDER — POTASSIUM CHLORIDE 1.5 G/1.58G
40 POWDER, FOR SOLUTION ORAL ONCE
Status: COMPLETED | OUTPATIENT
Start: 2023-11-14 | End: 2023-11-14

## 2023-11-14 RX ADMIN — ROSUVASTATIN CALCIUM 5 MG: 10 TABLET, FILM COATED ORAL at 20:02

## 2023-11-14 RX ADMIN — ACETAMINOPHEN 975 MG: 325 TABLET ORAL at 06:07

## 2023-11-14 RX ADMIN — ACETAMINOPHEN 975 MG: 325 TABLET ORAL at 21:27

## 2023-11-14 RX ADMIN — POLYETHYLENE GLYCOL 3350 17 G: 17 POWDER, FOR SOLUTION ORAL at 09:26

## 2023-11-14 RX ADMIN — ASPIRIN 81 MG: 81 TABLET, COATED ORAL at 09:26

## 2023-11-14 RX ADMIN — METOPROLOL TARTRATE 25 MG: 50 TABLET, FILM COATED ORAL at 20:02

## 2023-11-14 RX ADMIN — METOPROLOL TARTRATE 25 MG: 50 TABLET, FILM COATED ORAL at 09:26

## 2023-11-14 RX ADMIN — AMLODIPINE BESYLATE 5 MG: 5 TABLET ORAL at 09:26

## 2023-11-14 RX ADMIN — PERFLUTREN 3 ML OF DILUTION: 6.52 INJECTION, SUSPENSION INTRAVENOUS at 16:21

## 2023-11-14 RX ADMIN — HEPARIN SODIUM 700 UNITS/HR: 10000 INJECTION, SOLUTION INTRAVENOUS at 23:11

## 2023-11-14 RX ADMIN — POTASSIUM CHLORIDE 40 MEQ: 1.5 POWDER, FOR SOLUTION ORAL at 12:37

## 2023-11-14 RX ADMIN — OXYCODONE HYDROCHLORIDE 5 MG: 5 TABLET ORAL at 09:26

## 2023-11-14 RX ADMIN — SODIUM CHLORIDE, POTASSIUM CHLORIDE, SODIUM LACTATE AND CALCIUM CHLORIDE 500 ML: 600; 310; 30; 20 INJECTION, SOLUTION INTRAVENOUS at 17:50

## 2023-11-14 ASSESSMENT — ACTIVITIES OF DAILY LIVING (ADL): LACK_OF_TRANSPORTATION: NO

## 2023-11-14 ASSESSMENT — PAIN - FUNCTIONAL ASSESSMENT
PAIN_FUNCTIONAL_ASSESSMENT: 0-10

## 2023-11-14 ASSESSMENT — COGNITIVE AND FUNCTIONAL STATUS - GENERAL
DRESSING REGULAR UPPER BODY CLOTHING: A LITTLE
STANDING UP FROM CHAIR USING ARMS: A LITTLE
TOILETING: A LITTLE
DAILY ACTIVITIY SCORE: 19
WALKING IN HOSPITAL ROOM: A LITTLE
HELP NEEDED FOR BATHING: A LITTLE
DRESSING REGULAR LOWER BODY CLOTHING: A LITTLE
CLIMB 3 TO 5 STEPS WITH RAILING: A LITTLE
PERSONAL GROOMING: A LITTLE
MOBILITY SCORE: 21

## 2023-11-14 ASSESSMENT — PAIN SCALES - GENERAL
PAINLEVEL_OUTOF10: 7
PAINLEVEL_OUTOF10: 0 - NO PAIN
PAINLEVEL_OUTOF10: 4

## 2023-11-14 ASSESSMENT — PAIN DESCRIPTION - DESCRIPTORS: DESCRIPTORS: ACHING;SORE

## 2023-11-14 NOTE — PROGRESS NOTES
11/14/23 1007   Discharge Planning   Living Arrangements Family members  (Home with 34yo grandson.)   Support Systems Family members  (Sister Kayley)   Assistance Needed None   Type of Residence Private residence   Do you have animals or pets at home? No   Who is requesting discharge planning? Patient   Home or Post Acute Services None   Patient expects to be discharged to: Home   Does the patient need discharge transport arranged? No  (Pt will call for a ride home.)   Financial Resource Strain   How hard is it for you to pay for the very basics like food, housing, medical care, and heating? Not hard   Housing Stability   In the last 12 months, was there a time when you were not able to pay the mortgage or rent on time? N   In the last 12 months, was there a time when you did not have a steady place to sleep or slept in a shelter (including now)? N   Transportation Needs   In the past 12 months, has lack of transportation kept you from medical appointments or from getting medications? no   In the past 12 months, has lack of transportation kept you from meetings, work, or from getting things needed for daily living? No     Assessment Note:  Met with pt and introduced myself as care coordinator and member of the Care Transitions team for discharge planning.   Pt was independent prior to admission, and feels safe at home.  Pt uses [facility] for drs appts.  Pt's address, phone number and contact information was verified.  Pt does not have any questions/concerns at this time.     Previous Home Care: Denies  DME: None  Pharmacy: Mail order (cannot recall the name), [facility] on [address] in [place] (for short term meds).   Falls: Denies  PCP:  [facility], NUSRAT Resendiz Head (last visit 6/2023).    Micaela Kauffman MSN, RN-BC  Transitional Care Coordinator (TCC)  [phone]

## 2023-11-14 NOTE — DISCHARGE INSTRUCTIONS
Dear Ms. Schaeffer,    You were admitted to the [facility] for pain in your R lower back that spread down to your legs. We checked your labs and did a CT scan and we were able to rule out an infection but noted that the pain was due to a loss of blood flow to your right kidney. We suspect this was because you have afib and you did not take Eliquis for the past several days leading to a clot in your kidney likely coming from your heart. We scanned your heart and ruled out any issues with your heart and started you on a IV blood thinner, which overall stabilized you. We also watched your kidney numbers as they were slightly injured. For you to do at home:    -Please follow up with your outpatient cardiologist Dr. Thomas  -Please take your Eliquis DAILY, to prevent clot formation and similar symptoms happening.  -We decrease your Losartan dose from 100 mg daily to 25 mg daily. Please follow up with your cardiologist regarding dose.    Sincerely,    Your Care Team at [facility]

## 2023-11-14 NOTE — HOSPITAL COURSE
78-year-old female with past medical history of CVA (eliquis), GERD, gastritis, BPPV, COPD/chronic bronchitis(not on inhalers), paroxysmal AF, diverticulitis, breast cancer, HTN, HLD, and CHF (EF 45%) that presents today for Rt sided non-colicky flank pain.Vitals stable with tenderness in the RT CVA, Labs with no leucoytosis, hematuria, neg UA. CT AP with renal infarct and patent renal artery and veins. Pt likely had thromboembolic disease from lack of Eliquis compliance for past 4-5 days leading to embolus in kidney. Pt started on heparin gtt. Echocardiogram ordered for further workup and showed LVEF 45-50% and no LV thrombus and no interval changes from prior echo in 03/2022. On AM of 11/15, pt noted strong l sided headache that woke her up and noted to have dilated pupils. CTH ruled out hemorrhage or stroke and showed microvascular ischemic changes. Pt also continued to have NASIM with Feurea calculated at 39.6% suggesting intrinsic renal disease. Pt monitored for renal recovery and Cr continued to downtrend with adequate urine output. Pt was overall stable renally and discharged to SNF on Eliquis.

## 2023-11-14 NOTE — PROGRESS NOTES
NEPHROLOGY FOLLOW UP NOTE    Jazzy Schaeffer   78 y.o.      MRN/Room: [id number]    Subjective:   Ms. Schaeffer reports feeling well this morning, states that her right flank pain has improved to a dull soreness. Denies any fever/chills, N/V, abdominal pain, dysuria, hematuria, or subjective change in urine output.     Objective:   Meds:   acetaminophen, 975 mg, q8h  amLODIPine, 5 mg, Daily  aspirin, 81 mg, Daily  [Held by provider] furosemide, 20 mg, Every Mon/Wed/Fri  [Held by provider] losartan, 100 mg, Daily  metoprolol tartrate, 25 mg, BID  polyethylene glycol, 17 g, Daily  rosuvastatin, 5 mg, Nightly      heparin, Last Rate: Stopped (11/13/23 1743)      heparin, 2,000-4,000 Units, q4h PRN  meclizine, 12.5 mg, TID PRN  ondansetron, 4 mg, q8h PRN  oxyCODONE, 5 mg, q6h PRN  traZODone, 50 mg, Nightly PRN        Vitals:    11/14/23 1410   BP: 108/64   Pulse: 76   Resp: 14   Temp: 36.2 °C (97.2 °F)   SpO2: 92%          Intake/Output Summary (Last 24 hours) at 11/14/2023 1419  Last data filed at 11/14/2023 0930  Gross per 24 hour   Intake 760 ml   Output --   Net 760 ml     Physical Exam:  General appearance: Awake and alert, oriented, . No distress  HEENT: NC/AT, clear oral mucosa, MMM  Skin: no apparent rash  Heart: heart sounds 1 & 2 present and normal, no murmurs heard or rub  Lungs: Adequate air entry, breath sounds normal.  No wheezing/crackles  Abdomen: soft, non tender  Extremities: No edema, no joint swelling  Neuro: No FND,  no asterixis   ACCESS: pIV    Blood Labs:  Results for orders placed or performed during the hospital encounter of 11/12/23 (from the past 24 hour(s))   Heparin Assay, UFH   Result Value Ref Range    Heparin Unfractionated >2.0 (HH) See Comment Below for Therapeutic Ranges IU/mL   Heparin Assay, UFH   Result Value Ref Range    Heparin Unfractionated >2.0 (HH) See Comment Below for Therapeutic Ranges IU/mL   Heparin Assay   Result Value Ref Range    Heparin Unfractionated 1.9  (HH) See Comment Below for Therapeutic Ranges IU/mL   Heparin Assay   Result Value Ref Range    Heparin Unfractionated 1.4 (HH) See Comment Below for Therapeutic Ranges IU/mL   Renal Function Panel   Result Value Ref Range    Glucose 151 (H) 74 - 99 mg/dL    Sodium 140 136 - 145 mmol/L    Potassium 3.3 (L) 3.5 - 5.3 mmol/L    Chloride 101 98 - 107 mmol/L    Bicarbonate 30 21 - 32 mmol/L    Anion Gap 12 10 - 20 mmol/L    Urea Nitrogen 20 6 - 23 mg/dL    Creatinine 1.42 (H) 0.50 - 1.05 mg/dL    eGFR 38 (L) >60 mL/min/1.73m*2    Calcium 8.5 (L) 8.6 - 10.6 mg/dL    Phosphorus 3.3 2.5 - 4.9 mg/dL    Albumin 3.1 (L) 3.4 - 5.0 g/dL   CBC and Auto Differential   Result Value Ref Range    WBC 13.9 (H) 4.4 - 11.3 x10*3/uL    nRBC 0.0 0.0 - 0.0 /100 WBCs    RBC 4.58 4.00 - 5.20 x10*6/uL    Hemoglobin 11.7 (L) 12.0 - 16.0 g/dL    Hematocrit 37.9 36.0 - 46.0 %    MCV 83 80 - 100 fL    MCH 25.5 (L) 26.0 - 34.0 pg    MCHC 30.9 (L) 32.0 - 36.0 g/dL    RDW 14.6 (H) 11.5 - 14.5 %    Platelets 211 150 - 450 x10*3/uL    Neutrophils % 81.2 40.0 - 80.0 %    Immature Granulocytes %, Automated 0.5 0.0 - 0.9 %    Lymphocytes % 10.9 13.0 - 44.0 %    Monocytes % 7.0 2.0 - 10.0 %    Eosinophils % 0.2 0.0 - 6.0 %    Basophils % 0.2 0.0 - 2.0 %    Neutrophils Absolute 11.26 (H) 1.60 - 5.50 x10*3/uL    Immature Granulocytes Absolute, Automated 0.07 0.00 - 0.50 x10*3/uL    Lymphocytes Absolute 1.51 0.80 - 3.00 x10*3/uL    Monocytes Absolute 0.97 (H) 0.05 - 0.80 x10*3/uL    Eosinophils Absolute 0.03 0.00 - 0.40 x10*3/uL    Basophils Absolute 0.03 0.00 - 0.10 x10*3/uL   Magnesium   Result Value Ref Range    Magnesium 1.85 1.60 - 2.40 mg/dL   Heparin Assay   Result Value Ref Range    Heparin Unfractionated 1.2 (HH) See Comment Below for Therapeutic Ranges IU/mL   Heparin Assay, UFH   Result Value Ref Range    Heparin Unfractionated 1.0 See Comment Below for Therapeutic Ranges IU/mL          ASSESSMENT:  Jazzy Schaeffer is a  78 y.o.  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 Found to have evidence of possible right renal infarct on CT. Renal consulted for NASIM.    Creatinine mildly up-trending today, 1.4 from 1.25 yesterday. Urine studies pending.    #NASIM stage II  #Right renal infarct  -Baseline Cr 0.6-0.7  -Etiology: Suspect ischemic ATN 2/2 renal infarct vs contrast-induced nephropathy vs NSAID-related nephropathy  -UA showed 1+ protein, negative blood, nitrite, or LE  -Urine electrolytes and urine protein/Cr pending  -Clinical volume status: euvolemic  -No evidence of thomboembolism elsewhere on exam (ie, no skin changes, normal work of breathing on RA). Agree with Echo per primary team  -Electrolytes: K 3.3 (recommend repletion). Electrolytes otherwise WNL    Recommendations:  -Mild uptrend in creatinine today consistent with typical ATN course, will continue to monitor but anticipate eventual plateau and downtrend  -No major indication to hold home ARB and Lasix, would recommend trialing during inpatient stay in event patient has further bump in creatinine  -Continued anticoagulation per primary team for management of renal infarct  -Will follow-up urine studies  - Indication for dialysis:  No   - Avoid nephrotoxins, contrast if possible  - strict Is/Os  - Renal dosing for medications for latest eGFR, follow medication trough as appropriate  - Avoid hypotension/rapid fluctuations in BPs    Madyson Staley MD  Nephrology Resident  24 hour Renal Pager - [phone]    Discussed with attending nephrologist

## 2023-11-14 NOTE — NURSING NOTE
Pt heparin on hold since 11/13 at 1600 for heparin assay > 2.0.  Before hold order heparin was running at 10ml/hr.  Following heparin assays also elevated. Most recent assay 0.8 at 1652. Result reported to team.  MD howard to follow heparin protocol and to reassess lab in 4 hrs at 2100.  If < or = to 0.6 restart at 300 units less than original dose.  If >0.6 contact MD.

## 2023-11-14 NOTE — PROGRESS NOTES
"Jazzy Schaeffer is a 78 y.o. female on day 2 of admission presenting with Renal infarction (CMS/HCC).    Subjective   No acute events overnight. Pt noting improvement in R flank pain and pain no longer radiating to legs. Pt states the area of pain is still \"sore\". Pt denying nausea, vomiting, fevers/chills, urinary symptoms, chest pain, SOB.       Objective     Physical Exam  Constitutional:       General: She is not in acute distress.     Appearance: Normal appearance. She is normal weight.   HENT:      Head: Normocephalic and atraumatic.      Right Ear: Tympanic membrane normal.      Left Ear: Tympanic membrane normal.      Nose: Nose normal.      Mouth/Throat:      Mouth: Mucous membranes are moist.      Pharynx: Oropharynx is clear.   Eyes:      Extraocular Movements: Extraocular movements intact.      Conjunctiva/sclera: Conjunctivae normal.      Pupils: Pupils are equal, round, and reactive to light.   Cardiovascular:      Rate and Rhythm: Normal rate and regular rhythm.      Pulses: Normal pulses.      Heart sounds: Normal heart sounds. No murmur heard.     No friction rub. No gallop.   Pulmonary:      Effort: Pulmonary effort is normal. No respiratory distress.      Breath sounds: Normal breath sounds. No wheezing, rhonchi or rales.   Chest:      Chest wall: No tenderness.   Abdominal:      General: Abdomen is flat. Bowel sounds are normal. There is no distension.      Palpations: Abdomen is soft. There is no mass.      Tenderness: There is no abdominal tenderness. There is no right CVA tenderness, left CVA tenderness, guarding or rebound.      Hernia: No hernia is present.   Musculoskeletal:         General: Normal range of motion.   Skin:     General: Skin is warm and dry.      Capillary Refill: Capillary refill takes less than 2 seconds.   Neurological:      General: No focal deficit present.      Mental Status: She is alert and oriented to person, place, and time. Mental status is at baseline. " "  Psychiatric:         Mood and Affect: Mood normal.         Behavior: Behavior normal.         Thought Content: Thought content normal.         Judgment: Judgment normal.         Last Recorded Vitals  Blood pressure 127/68, pulse 82, temperature 37 °C (98.6 °F), temperature source Temporal, resp. rate 18, height 1.651 m (5' 5\"), weight 83.9 kg (185 lb), SpO2 96 %.  Intake/Output last 3 Shifts:  I/O last 3 completed shifts:  In: 360 (4.3 mL/kg) [P.O.:360]  Out: - (0 mL/kg)   Weight: 83.9 kg     Relevant Results  Scheduled medications  acetaminophen, 975 mg, oral, q8h  amLODIPine, 5 mg, oral, Daily  aspirin, 81 mg, oral, Daily  [Held by provider] furosemide, 20 mg, oral, Every Mon/Wed/Fri  [Held by provider] losartan, 100 mg, oral, Daily  metoprolol tartrate, 25 mg, oral, BID  polyethylene glycol, 17 g, oral, Daily  rosuvastatin, 5 mg, oral, Nightly      Continuous medications  heparin, 0-4,000 Units/hr, Last Rate: Stopped (11/13/23 1743)      PRN medications  PRN medications: heparin, meclizine, ondansetron, oxyCODONE, traZODone    Results for orders placed or performed during the hospital encounter of 11/12/23 (from the past 24 hour(s))   Heparin Assay, UFH   Result Value Ref Range    Heparin Unfractionated >2.0 (HH) See Comment Below for Therapeutic Ranges IU/mL   Heparin Assay, UFH   Result Value Ref Range    Heparin Unfractionated >2.0 (HH) See Comment Below for Therapeutic Ranges IU/mL   Heparin Assay   Result Value Ref Range    Heparin Unfractionated 1.9 (HH) See Comment Below for Therapeutic Ranges IU/mL   Heparin Assay   Result Value Ref Range    Heparin Unfractionated 1.4 (HH) See Comment Below for Therapeutic Ranges IU/mL   Renal Function Panel   Result Value Ref Range    Glucose 151 (H) 74 - 99 mg/dL    Sodium 140 136 - 145 mmol/L    Potassium 3.3 (L) 3.5 - 5.3 mmol/L    Chloride 101 98 - 107 mmol/L    Bicarbonate 30 21 - 32 mmol/L    Anion Gap 12 10 - 20 mmol/L    Urea Nitrogen 20 6 - 23 mg/dL    " Creatinine 1.42 (H) 0.50 - 1.05 mg/dL    eGFR 38 (L) >60 mL/min/1.73m*2    Calcium 8.5 (L) 8.6 - 10.6 mg/dL    Phosphorus 3.3 2.5 - 4.9 mg/dL    Albumin 3.1 (L) 3.4 - 5.0 g/dL   CBC and Auto Differential   Result Value Ref Range    WBC 13.9 (H) 4.4 - 11.3 x10*3/uL    nRBC 0.0 0.0 - 0.0 /100 WBCs    RBC 4.58 4.00 - 5.20 x10*6/uL    Hemoglobin 11.7 (L) 12.0 - 16.0 g/dL    Hematocrit 37.9 36.0 - 46.0 %    MCV 83 80 - 100 fL    MCH 25.5 (L) 26.0 - 34.0 pg    MCHC 30.9 (L) 32.0 - 36.0 g/dL    RDW 14.6 (H) 11.5 - 14.5 %    Platelets 211 150 - 450 x10*3/uL    Neutrophils % 81.2 40.0 - 80.0 %    Immature Granulocytes %, Automated 0.5 0.0 - 0.9 %    Lymphocytes % 10.9 13.0 - 44.0 %    Monocytes % 7.0 2.0 - 10.0 %    Eosinophils % 0.2 0.0 - 6.0 %    Basophils % 0.2 0.0 - 2.0 %    Neutrophils Absolute 11.26 (H) 1.60 - 5.50 x10*3/uL    Immature Granulocytes Absolute, Automated 0.07 0.00 - 0.50 x10*3/uL    Lymphocytes Absolute 1.51 0.80 - 3.00 x10*3/uL    Monocytes Absolute 0.97 (H) 0.05 - 0.80 x10*3/uL    Eosinophils Absolute 0.03 0.00 - 0.40 x10*3/uL    Basophils Absolute 0.03 0.00 - 0.10 x10*3/uL   Magnesium   Result Value Ref Range    Magnesium 1.85 1.60 - 2.40 mg/dL   Heparin Assay   Result Value Ref Range    Heparin Unfractionated 1.2 (HH) See Comment Below for Therapeutic Ranges IU/mL     CT abdomen pelvis w IV contrast   Final Result   1.  Interval development of large wedge-shaped hypoenhancing areas in   the mid to inferior portions of the right kidney with no significant   associated fat stranding or urothelial thickening. Findings   concerning for right renal infarction. Pyelonephritis is a   possibility, for correlation with urinalysis. The right renal artery   and vein appear patent within the limits of the current exam.   2. Small hiatal hernia. Apparent gastric wall thickening which can be   secondary to nondistention versus mild gastritis.   3. Additional chronic findings are as described above.        I  personally reviewed the images/study and I agree with the findings   as stated by Resident Sebastien Xiong MD. This study was interpreted   at [facility], [place], [place].        MACRO:   None        Signed by: Valeri Greenfield 11/12/2023 6:15 PM   Dictation workstation:   [id number]      XR chest 2 views   Final Result   No acute cardiopulmonary process.   Signed by Lalo Garduno MD      Transthoracic Echo (TTE) Complete    (Results Pending)   Vascular US renal artery duplex complete    (Results Pending)           Assessment/Plan   Principal Problem:    Renal infarction (CMS/HCC)    78-year-old female with past medical history of CVA (eliquis), GERD, gastritis, BPPV, COPD/chronic bronchitis(not on inhalers), paroxysmal AF, diverticulitis, breast cancer, HTN, HLD, and CHF (EF 45%) that presents today for Rt sided non-colicky flank pain.Vitals stable with tenderness in the RT CVA, Labs with no leucoytosis, hematuria, neg UA. CT AP with renal infarct and patent renal artery and veins. Pts Clinical presentation unlikely renal colic (as pt pain not colicky, has no hematuria,)Pyelonephritis unlikely as pt afebrile and with no leucocytosis. Renal infarction likely thrombo embolic iso hcx of paroxysmal AF and questionable compliance to eliquis vs. Possible hypercoagulable state.    11/14 Updates:  -Pending echo, APLS workup  -Eliquis held, start heparin gtt  -UA/Urine electrolytes sent  -Renal duplex US ordered  -Can likely discharge on Eliquis after echo.    ##Renal infarct  #Pyelonephritis or Nephrolithiasis unlikely  #Hx of paroxysmal AF  #NASIM  -Fu Echo (TTE/ELA) in am, antiphospholid screen, Protein C and S, antithrombin to determine aetiology of infarct.  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   :: Not on any inhalers.   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 Major, on 6/15/2020\  pLAN:  -Can consider MRI of the Lumbar and thoracic region  -Hold Duloxetine 30mg daily  -c/w other pain meds, can consider mm relaxant if pain not improving      #Ductal carcinoma in situ (DCIS) of left breast   ::s/p left breast mastectomy on 5/22/19.     ::Follows with breast surgery  Stable        #Urothelial carcinoma of bladder    -S/p TURBT and chemotherapy  Plan:   -Follows with Urologist, Dr. Bella.          #Insomnia  #possible EVA   -trazodone 50 mg at bedtime  -sleep study results pending     #Benign paroxysmal positional vertigo of right ear           Meclizine prn  Follows up with Physical Therapy        F : PRN  E: PRN  N: Adult regular  A: PIV   DVT Prophylaxis: Heparin gtt     Code Status: Full Code (confirmed on admission)   NOK:  Primary Emergency Contact: Kayley Penny, Home Phone: [phone]           Jose Jones MD  PGY-1  [place] Pager [phone]

## 2023-11-15 ENCOUNTER — APPOINTMENT (OUTPATIENT)
Dept: RADIOLOGY | Facility: HOSPITAL | Age: 78
DRG: 698 | End: 2023-11-15
Payer: MEDICARE

## 2023-11-15 ENCOUNTER — APPOINTMENT (OUTPATIENT)
Dept: VASCULAR MEDICINE | Facility: HOSPITAL | Age: 78
DRG: 698 | End: 2023-11-15
Payer: MEDICARE

## 2023-11-15 LAB
ALBUMIN SERPL BCP-MCNC: 3.3 G/DL (ref 3.4–5)
ANION GAP SERPL CALC-SCNC: 15 MMOL/L (ref 10–20)
BASOPHILS # BLD AUTO: 0.03 X10*3/UL (ref 0–0.1)
BASOPHILS NFR BLD AUTO: 0.3 %
BUN SERPL-MCNC: 18 MG/DL (ref 6–23)
CALCIUM SERPL-MCNC: 8.6 MG/DL (ref 8.6–10.6)
CHLORIDE SERPL-SCNC: 104 MMOL/L (ref 98–107)
CO2 SERPL-SCNC: 26 MMOL/L (ref 21–32)
CREAT SERPL-MCNC: 1.24 MG/DL (ref 0.5–1.05)
EOSINOPHIL # BLD AUTO: 0.06 X10*3/UL (ref 0–0.4)
EOSINOPHIL NFR BLD AUTO: 0.5 %
ERYTHROCYTE [DISTWIDTH] IN BLOOD BY AUTOMATED COUNT: 14.7 % (ref 11.5–14.5)
GFR SERPL CREATININE-BSD FRML MDRD: 45 ML/MIN/1.73M*2
GLUCOSE SERPL-MCNC: 86 MG/DL (ref 74–99)
HCT VFR BLD AUTO: 38 % (ref 36–46)
HGB BLD-MCNC: 11.8 G/DL (ref 12–16)
IMM GRANULOCYTES # BLD AUTO: 0.04 X10*3/UL (ref 0–0.5)
IMM GRANULOCYTES NFR BLD AUTO: 0.3 % (ref 0–0.9)
LYMPHOCYTES # BLD AUTO: 1.7 X10*3/UL (ref 0.8–3)
LYMPHOCYTES NFR BLD AUTO: 14.4 %
MAGNESIUM SERPL-MCNC: 2.17 MG/DL (ref 1.6–2.4)
MCH RBC QN AUTO: 25.8 PG (ref 26–34)
MCHC RBC AUTO-ENTMCNC: 31.1 G/DL (ref 32–36)
MCV RBC AUTO: 83 FL (ref 80–100)
MONOCYTES # BLD AUTO: 1.24 X10*3/UL (ref 0.05–0.8)
MONOCYTES NFR BLD AUTO: 10.5 %
NEUTROPHILS # BLD AUTO: 8.72 X10*3/UL (ref 1.6–5.5)
NEUTROPHILS NFR BLD AUTO: 74 %
NRBC BLD-RTO: 0 /100 WBCS (ref 0–0)
PHOSPHATE SERPL-MCNC: 3.1 MG/DL (ref 2.5–4.9)
PLATELET # BLD AUTO: 214 X10*3/UL (ref 150–450)
POTASSIUM SERPL-SCNC: 4 MMOL/L (ref 3.5–5.3)
RBC # BLD AUTO: 4.57 X10*6/UL (ref 4–5.2)
SODIUM SERPL-SCNC: 141 MMOL/L (ref 136–145)
UFH PPP CHRO-ACNC: 0.4 IU/ML
UFH PPP CHRO-ACNC: 0.5 IU/ML
WBC # BLD AUTO: 11.8 X10*3/UL (ref 4.4–11.3)

## 2023-11-15 PROCEDURE — 36415 COLL VENOUS BLD VENIPUNCTURE: CPT

## 2023-11-15 PROCEDURE — 83735 ASSAY OF MAGNESIUM: CPT

## 2023-11-15 PROCEDURE — 99233 SBSQ HOSP IP/OBS HIGH 50: CPT

## 2023-11-15 PROCEDURE — 80069 RENAL FUNCTION PANEL: CPT

## 2023-11-15 PROCEDURE — 70450 CT HEAD/BRAIN W/O DYE: CPT

## 2023-11-15 PROCEDURE — 97161 PT EVAL LOW COMPLEX 20 MIN: CPT | Mod: GP

## 2023-11-15 PROCEDURE — 2500000001 HC RX 250 WO HCPCS SELF ADMINISTERED DRUGS (ALT 637 FOR MEDICARE OP)

## 2023-11-15 PROCEDURE — 93975 VASCULAR STUDY: CPT | Performed by: SURGERY

## 2023-11-15 PROCEDURE — 93975 VASCULAR STUDY: CPT

## 2023-11-15 PROCEDURE — 2500000004 HC RX 250 GENERAL PHARMACY W/ HCPCS (ALT 636 FOR OP/ED)

## 2023-11-15 PROCEDURE — 70450 CT HEAD/BRAIN W/O DYE: CPT | Performed by: RADIOLOGY

## 2023-11-15 PROCEDURE — 85520 HEPARIN ASSAY: CPT

## 2023-11-15 PROCEDURE — 85025 COMPLETE CBC W/AUTO DIFF WBC: CPT

## 2023-11-15 PROCEDURE — 1100000001 HC PRIVATE ROOM DAILY

## 2023-11-15 RX ADMIN — SODIUM CHLORIDE, POTASSIUM CHLORIDE, SODIUM LACTATE AND CALCIUM CHLORIDE 500 ML: 600; 310; 30; 20 INJECTION, SOLUTION INTRAVENOUS at 17:44

## 2023-11-15 RX ADMIN — ACETAMINOPHEN 975 MG: 325 TABLET ORAL at 20:32

## 2023-11-15 RX ADMIN — ACETAMINOPHEN 975 MG: 325 TABLET ORAL at 14:15

## 2023-11-15 RX ADMIN — ASPIRIN 81 MG: 81 TABLET, COATED ORAL at 10:52

## 2023-11-15 RX ADMIN — POLYETHYLENE GLYCOL 3350 17 G: 17 POWDER, FOR SOLUTION ORAL at 10:52

## 2023-11-15 RX ADMIN — METOPROLOL TARTRATE 25 MG: 50 TABLET, FILM COATED ORAL at 10:52

## 2023-11-15 RX ADMIN — OXYCODONE HYDROCHLORIDE 5 MG: 5 TABLET ORAL at 10:54

## 2023-11-15 RX ADMIN — AMLODIPINE BESYLATE 5 MG: 5 TABLET ORAL at 10:52

## 2023-11-15 RX ADMIN — METOPROLOL TARTRATE 25 MG: 50 TABLET, FILM COATED ORAL at 20:32

## 2023-11-15 RX ADMIN — ACETAMINOPHEN 975 MG: 325 TABLET ORAL at 05:15

## 2023-11-15 RX ADMIN — ROSUVASTATIN CALCIUM 5 MG: 10 TABLET, FILM COATED ORAL at 20:32

## 2023-11-15 ASSESSMENT — COGNITIVE AND FUNCTIONAL STATUS - GENERAL
WALKING IN HOSPITAL ROOM: A LITTLE
TURNING FROM BACK TO SIDE WHILE IN FLAT BAD: A LITTLE
TURNING FROM BACK TO SIDE WHILE IN FLAT BAD: A LITTLE
MOVING TO AND FROM BED TO CHAIR: A LITTLE
STANDING UP FROM CHAIR USING ARMS: A LITTLE
MOVING FROM LYING ON BACK TO SITTING ON SIDE OF FLAT BED WITH BEDRAILS: A LITTLE
DAILY ACTIVITIY SCORE: 21
HELP NEEDED FOR BATHING: A LITTLE
DRESSING REGULAR LOWER BODY CLOTHING: A LITTLE
DRESSING REGULAR UPPER BODY CLOTHING: A LITTLE
STANDING UP FROM CHAIR USING ARMS: A LITTLE
MOVING TO AND FROM BED TO CHAIR: A LITTLE
WALKING IN HOSPITAL ROOM: A LITTLE
MOBILITY SCORE: 16
MOBILITY SCORE: 18
CLIMB 3 TO 5 STEPS WITH RAILING: A LOT
CLIMB 3 TO 5 STEPS WITH RAILING: TOTAL

## 2023-11-15 ASSESSMENT — PAIN SCALES - GENERAL
PAINLEVEL_OUTOF10: 5 - MODERATE PAIN
PAINLEVEL_OUTOF10: 0 - NO PAIN
PAINLEVEL_OUTOF10: 4
PAINLEVEL_OUTOF10: 5 - MODERATE PAIN
PAINLEVEL_OUTOF10: 6

## 2023-11-15 ASSESSMENT — PAIN - FUNCTIONAL ASSESSMENT
PAIN_FUNCTIONAL_ASSESSMENT: 0-10

## 2023-11-15 ASSESSMENT — PAIN DESCRIPTION - DESCRIPTORS: DESCRIPTORS: ACHING

## 2023-11-15 ASSESSMENT — PAIN DESCRIPTION - LOCATION: LOCATION: HEAD

## 2023-11-15 ASSESSMENT — ACTIVITIES OF DAILY LIVING (ADL): ADL_ASSISTANCE: INDEPENDENT

## 2023-11-15 NOTE — PROGRESS NOTES
"Physical Therapy    Physical Therapy Evaluation    Patient Name: Jazzy Schaeffer  MRN: [id number]  Today's Date: 11/15/2023   Time Calculation  Start Time: 1459  Stop Time: 1519  Time Calculation (min): 20 min    Assessment/Plan   PT Assessment  PT Assessment Results: Decreased strength, Decreased endurance, Impaired balance, Decreased mobility, Pain  Rehab Prognosis: Good  Medical Staff Made Aware: Yes  End of Session Communication: Bedside nurse, PCT/NA/CTA, Physician  Assessment Comment: 77yo female presents with dec strength, balance, & endurance as well as inc pain limiting functional mobility.  Pt would benefit from continued therapy to address these deficits and improve safety and indep.  End of Session Patient Position: Bed, 3 rail up, Alarm on  IP OR SWING BED PT PLAN  Inpatient or Swing Bed: Inpatient  PT Plan  Treatment/Interventions: Bed mobility, Transfer training, Balance training, Stair training, Gait training, Strengthening, Endurance training, Therapeutic exercise, Therapeutic activity  PT Plan: Skilled PT  PT Frequency: 3 times per week  PT Discharge Recommendations: Moderate intensity level of continued care  PT Recommended Transfer Status: Assist x1  PT - OK to Discharge: Yes (PT eval completed & dc recs made)      Subjective   General Visit Information:  General  Reason for Referral: Rt sided non-colicky flank pain  Past Medical History Relevant to Rehab: CVA (eliquis), GERD, gastritis, BPPV, COPD/chronic bronchitis(not on inhalers), paroxysmal AF, diverticulitis, breast cancer, HTN, HLD, and CHF (EF 45%)  Prior to Session Communication: Bedside nurse  Patient Position Received: Bed, 3 rail up, Alarm off, not on at start of session  General Comment: Pt pleasant & cooperative, willing to participate in therapy assessment.  Pt with episode of \"being off\" during ambulation - RN & MD notified.  Jose Jones MD notified & states he will stop by to see pt.  Home Living:  Home Living  Type of Home: " House  Lives With:  (grandson - works during the day)  Home Layout: Two level, Able to live on main level with bedroom/bathroom  Home Access: Stairs to enter with rails  Entrance Stairs-Number of Steps: 5  Prior Level of Function:  Prior Function Per Pt/Caregiver Report  Level of [place]: Independent with ADLs and functional transfers, Independent with homemaking with ambulation  ADL Assistance: Independent  Homemaking Assistance: Independent  Ambulatory Assistance: Independent  Vocational:  (works at [employer])  Prior Function Comments: does not drive; uses public transit  Precautions:  Precautions  Medical Precautions: Fall precautions  Vital Signs:  Vital Signs  Heart Rate: 84  SpO2: 100 %  BP: 110/86  Patient Position:  (sitting EOB, post ambulation)    Objective   Pain:  Pain Assessment  Pain Assessment: 0-10  Pain Score: 6  Pain Type: Acute pain  Pain Location:  (R hip/flank; later reporting L head pain)  Pain Interventions: MD notified (Comment)  Cognition:  Cognition  Orientation Level: Oriented X4 (with delayed responses)  Processing Speed: Delayed (post ambulation)    Activity Tolerance  Endurance: Tolerates 10 - 20 min exercise with multiple rests    Sensation  Light Touch:  (reports R hip area feels numb; L hand tingling)    Coordination  Finger to Nose: Bradykinesia, Dyskinesia (BUE)    Postural Control  Postural Control: Within Functional Limits    Static Sitting Balance  Static Sitting-Level of Assistance: Close supervision    Static Standing Balance  Static Standing-Level of Assistance: Minimum assistance  Functional Assessments:  Bed Mobility  Bed Mobility: Yes  Bed Mobility 1  Bed Mobility 1: Supine to sitting, Sitting to supine  Level of Assistance 1: Contact guard, Minimal verbal cues    Transfers  Transfer: Yes  Transfer 1  Technique 1: Sit to stand  Transfer Level of Assistance 1: Contact guard  Transfers 2  Technique 2: Stand to sit  Transfer Level of Assistance 2: Minimum  "assistance  Trials/Comments 2: poor eccentric control    Ambulation/Gait Training  Ambulation/Gait Training Performed: Yes  Ambulation/Gait Training 1  Surface 1: Level tile  Device 1: No device  Assistance 1: Minimum assistance  Quality of Gait 1:  (Gait unsteady with inc lateral LOBs noted; during gait, pt became more \"off\" appearing slightly disoriented - RN & MD notified)  Comments/Distance (ft) 1: 30'  Extremity/Trunk Assessments:   RUE grossly 4-/5; LUE 4+/5      RLE   RLE :  (pain limited, but grossly >/= 3/5 distally)  LLE   LLE :  (grossly WFL for mobility)  Outcome Measures:  [facility] Basic Mobility  Turning from your back to your side while in a flat bed without using bedrails: A little  Moving from lying on your back to sitting on the side of a flat bed without using bedrails: A little  Moving to and from bed to chair (including a wheelchair): A little  Standing up from a chair using your arms (e.g. wheelchair or bedside chair): A little  To walk in hospital room: A little  Climbing 3-5 steps with railing: Total  Basic Mobility - Total Score: 16    Encounter Problems       Encounter Problems (Active)       Balance       STG - Maintains static sitting balance with upper extremity support using LRD and SBA (Progressing)       Start:  11/15/23    Expected End:  11/29/23               Mobility       STG - Patient will ambulate >100' with LRD, SBA, & VSS (Progressing)       Start:  11/15/23    Expected End:  11/29/23            STG - Patient will ascend and descend four stairs with HR and CGA (Progressing)       Start:  11/15/23    Expected End:  11/29/23               Transfers       STG - Patient to transfer to and from sit to supine indep (Progressing)       Start:  11/15/23    Expected End:  11/29/23            STG - Patient will transfer sit to and from stand with SBA (Progressing)       Start:  11/15/23    Expected End:  11/29/23                   Education Documentation  Precautions, taught by Tammy NAM " [place], PT at 11/15/2023  4:02 PM.  Learner: Patient  Readiness: Acceptance  Method: Explanation  Response: Verbalizes Understanding    Mobility Training, taught by Tammy Leiva PT at 11/15/2023  4:02 PM.  Learner: Patient  Readiness: Acceptance  Method: Explanation  Response: Verbalizes Understanding    Education Comments  No comments found.

## 2023-11-15 NOTE — CARE PLAN
HPI: Jazzy Schaeffer is a  78 y.o.  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 Found to have evidence of possible right renal infarct on CT. Renal consulted for NASIM.    Interval history:   FeNa 0.2%, FeUrea 34.7%, c/w pre-renal picture. Suspect possible contrast-induced nephropathy vs NSAID-related nephropathy, possibly with superimposed ischemic ATN 2/2 renal infarct.    Creatinine has improved with supportive measures, 1.24 from 1.40. Electrolytes and acid/base status stable. Will sign off at this time. Please reach out with further questions.      Recommendations:  -Ok to resume home Lasix and losartan

## 2023-11-15 NOTE — PROGRESS NOTES
11/15/23 1652   Discharge Planning   Who is requesting discharge planning? Provider   Home or Post Acute Services Post acute facilities (Rehab/SNF/etc)   Type of Post Acute Facility Services Skilled nursing   Patient expects to be discharged to: Skilled nursing facility   Does the patient need discharge transport arranged? Yes   RoundTrip coordination needed? Yes   Has discharge transport been arranged? No   What day is the transport expected? 11/17/23   What time is the transport expected? 1200   Patient Choice   Provider Choice list and CMS website (https://medicare.gov/care-compare#search) for post-acute Quality and Resource Measure Data were provided and reviewed with: Family;Patient   Patient / Family choosing to utilize agency / facility established prior to hospitalization No     RN TCC met with pt to discuss discharge planning. Pt aware PT is recommending moderate intensity therapy. Pt provided with education regarding the different level of therapy recommendations (high, moderate, intensity) and verbalized understanding. Pt accepted SNF list and agreed to discuss SNF placement with sister Kayley prior to making a decision. Pt aware if she is interested in SNF placement we will ask for at least 3 SNF preferences to start the process. Pt educated she will be re-evaluated by therapy closer to discharge for SNF precert and we will continue to monitor for any change in therapy recommendations.    Attempted to reach Kayley [phone] to include in discharge planning discussion but male stated she went to the store. Left my name and contact with male for return call. Care coordinator will continue to follow for discharge planning needs.    Alka Asif RN  Transitional Care Coordinator/TCC  [phone]

## 2023-11-15 NOTE — PROGRESS NOTES
Jazzy Schaeffer is a 78 y.o. female on day 3 of admission presenting with Renal infarction (CMS/HCC).    Subjective   No acute events overnight. Pt this morning at renal duplex ultrasound. Pt seen later this AM and noted 9/10 headache on her L side. Pt noted this started 3 weeks ago and wakes her up at night. Pt otherwise denied any weakness, numbness, tingling, or neurologic changes. Pt denying nausea, vomiting, fevers/chills, urinary symptoms, chest pain, SOB.       Objective     Physical Exam  Vitals reviewed.   Constitutional:       General: She is not in acute distress.     Appearance: Normal appearance. She is normal weight.   HENT:      Head: Normocephalic and atraumatic.      Right Ear: Tympanic membrane normal.      Left Ear: Tympanic membrane normal.      Nose: Nose normal.      Mouth/Throat:      Mouth: Mucous membranes are moist.      Pharynx: Oropharynx is clear.   Eyes:      Extraocular Movements: Extraocular movements intact.      Conjunctiva/sclera: Conjunctivae normal.      Pupils: Pupils are equal, round, and reactive to light.   Cardiovascular:      Rate and Rhythm: Normal rate and regular rhythm.      Pulses: Normal pulses.      Heart sounds: Normal heart sounds. No murmur heard.     No friction rub. No gallop.   Pulmonary:      Effort: Pulmonary effort is normal. No respiratory distress.      Breath sounds: Normal breath sounds. No wheezing, rhonchi or rales.   Chest:      Chest wall: No tenderness.   Abdominal:      General: Abdomen is flat. Bowel sounds are normal. There is no distension.      Palpations: Abdomen is soft. There is no mass.      Tenderness: There is no abdominal tenderness. There is no right CVA tenderness, left CVA tenderness, guarding or rebound.      Hernia: No hernia is present.   Musculoskeletal:         General: Normal range of motion.   Skin:     General: Skin is warm and dry.      Capillary Refill: Capillary refill takes less than 2 seconds.   Neurological:       "General: No focal deficit present.      Mental Status: She is alert and oriented to person, place, and time. Mental status is at baseline.      Cranial Nerves: No cranial nerve deficit.      Sensory: No sensory deficit.      Motor: No weakness.      Coordination: Coordination normal.      Gait: Gait normal.      Deep Tendon Reflexes: Reflexes normal.   Psychiatric:         Mood and Affect: Mood normal.         Behavior: Behavior normal.         Thought Content: Thought content normal.         Judgment: Judgment normal.         Last Recorded Vitals  Blood pressure 153/83, pulse 80, temperature 36.3 °C (97.3 °F), resp. rate 18, height 1.651 m (5' 5\"), weight 83.9 kg (185 lb), SpO2 97 %.  Intake/Output last 3 Shifts:  I/O last 3 completed shifts:  In: 1528 (18.2 mL/kg) [P.O.:1480; I.V.:48 (0.6 mL/kg)]  Out: - (0 mL/kg)   Weight: 83.9 kg     Relevant Results  Scheduled medications  acetaminophen, 975 mg, oral, q8h  amLODIPine, 5 mg, oral, Daily  aspirin, 81 mg, oral, Daily  [Held by provider] furosemide, 20 mg, oral, Every Mon/Wed/Fri  [Held by provider] losartan, 100 mg, oral, Daily  metoprolol tartrate, 25 mg, oral, BID  polyethylene glycol, 17 g, oral, Daily  rosuvastatin, 5 mg, oral, Nightly      Continuous medications  heparin, 0-4,000 Units/hr, Last Rate: 700 Units/hr (11/15/23 0300)      PRN medications  PRN medications: heparin, meclizine, ondansetron, oxyCODONE, traZODone    Results for orders placed or performed during the hospital encounter of 11/12/23 (from the past 24 hour(s))   Transthoracic Echo (TTE) Complete   Result Value Ref Range    AV pk ondina 1.25     LVOT diam 2.00     LV biplane EF 46     MV avg E/e' ratio 28.44     MV E/A ratio 2.84     LA vol index A/L 72.2     Tricuspid annular plane systolic excursion 1.7     RV free wall pk S' 12.30     LVIDd 5.10     RVSP 37.3     Aortic Valve Area by Continuity of Peak Velocity 2.25     AV pk grad 6.3     LV A4C EF 46.2    Heparin Assay, UFH   Result Value " Ref Range    Heparin Unfractionated 0.8 See Comment Below for Therapeutic Ranges IU/mL   Heparin Assay, UFH   Result Value Ref Range    Heparin Unfractionated 0.5 See Comment Below for Therapeutic Ranges IU/mL   Heparin Assay, UFH   Result Value Ref Range    Heparin Unfractionated 0.5 See Comment Below for Therapeutic Ranges IU/mL   Heparin Assay, UFH   Result Value Ref Range    Heparin Unfractionated 0.4 See Comment Below for Therapeutic Ranges IU/mL   Renal Function Panel   Result Value Ref Range    Glucose 86 74 - 99 mg/dL    Sodium 141 136 - 145 mmol/L    Potassium 4.0 3.5 - 5.3 mmol/L    Chloride 104 98 - 107 mmol/L    Bicarbonate 26 21 - 32 mmol/L    Anion Gap 15 10 - 20 mmol/L    Urea Nitrogen 18 6 - 23 mg/dL    Creatinine 1.24 (H) 0.50 - 1.05 mg/dL    eGFR 45 (L) >60 mL/min/1.73m*2    Calcium 8.6 8.6 - 10.6 mg/dL    Phosphorus 3.1 2.5 - 4.9 mg/dL    Albumin 3.3 (L) 3.4 - 5.0 g/dL   CBC and Auto Differential   Result Value Ref Range    WBC 11.8 (H) 4.4 - 11.3 x10*3/uL    nRBC 0.0 0.0 - 0.0 /100 WBCs    RBC 4.57 4.00 - 5.20 x10*6/uL    Hemoglobin 11.8 (L) 12.0 - 16.0 g/dL    Hematocrit 38.0 36.0 - 46.0 %    MCV 83 80 - 100 fL    MCH 25.8 (L) 26.0 - 34.0 pg    MCHC 31.1 (L) 32.0 - 36.0 g/dL    RDW 14.7 (H) 11.5 - 14.5 %    Platelets 214 150 - 450 x10*3/uL    Neutrophils % 74.0 40.0 - 80.0 %    Immature Granulocytes %, Automated 0.3 0.0 - 0.9 %    Lymphocytes % 14.4 13.0 - 44.0 %    Monocytes % 10.5 2.0 - 10.0 %    Eosinophils % 0.5 0.0 - 6.0 %    Basophils % 0.3 0.0 - 2.0 %    Neutrophils Absolute 8.72 (H) 1.60 - 5.50 x10*3/uL    Immature Granulocytes Absolute, Automated 0.04 0.00 - 0.50 x10*3/uL    Lymphocytes Absolute 1.70 0.80 - 3.00 x10*3/uL    Monocytes Absolute 1.24 (H) 0.05 - 0.80 x10*3/uL    Eosinophils Absolute 0.06 0.00 - 0.40 x10*3/uL    Basophils Absolute 0.03 0.00 - 0.10 x10*3/uL   Magnesium   Result Value Ref Range    Magnesium 2.17 1.60 - 2.40 mg/dL     Vascular US renal artery duplex complete    Final Result      Transthoracic Echo (TTE) Complete   Final Result      CT abdomen pelvis w IV contrast   Final Result   1.  Interval development of large wedge-shaped hypoenhancing areas in   the mid to inferior portions of the right kidney with no significant   associated fat stranding or urothelial thickening. Findings   concerning for right renal infarction. Pyelonephritis is a   possibility, for correlation with urinalysis. The right renal artery   and vein appear patent within the limits of the current exam.   2. Small hiatal hernia. Apparent gastric wall thickening which can be   secondary to nondistention versus mild gastritis.   3. Additional chronic findings are as described above.        I personally reviewed the images/study and I agree with the findings   as stated by Resident Sebastien Xiong MD. This study was interpreted   at [facility], [place], [place].        MACRO:   None        Signed by: Valeri Greenfield 11/12/2023 6:15 PM   Dictation workstation:   [id number]      XR chest 2 views   Final Result   No acute cardiopulmonary process.   Signed by Lalo Garduno MD      CT head wo IV contrast    (Results Pending)           Assessment/Plan   Principal Problem:    Renal infarction (CMS/HCC)    78-year-old female with past medical history of CVA (eliquis), GERD, gastritis, BPPV, COPD/chronic bronchitis(not on inhalers), paroxysmal AF, diverticulitis, breast cancer, HTN, HLD, and CHF (EF 45%) that presents today for Rt sided non-colicky flank pain.Vitals stable with tenderness in the RT CVA, Labs with no leucoytosis, hematuria, neg UA. CT AP with renal infarct and patent renal artery and veins. Pts Clinical presentation unlikely renal colic (as pt pain not colicky, has no hematuria,)Pyelonephritis unlikely as pt afebrile and with no leucocytosis. Renal infarction likely thrombo embolic iso hcx of paroxysmal AF and questionable compliance to eliquis vs. Possible  hypercoagulable state.    11/15 Updates:  -Echo showing EF 45-50 with dilated LV similar to exam in 03/2022  -On heparin gtt  -UA/Urine electrolytes showing Feurea 39.6% suggesting intrinsic renal disease, likely ATN per nephrology  -Renal duplex US showing patency of L and R arteries/veins  -CT head noncon for new onset headaches    ##Renal infarct  #Pyelonephritis or Nephrolithiasis unlikely  #Hx of paroxysmal AF  #NASIM  -Fu Echo (TTE/ELA) in am, antiphospholid screen, Protein C and S, antithrombin to determine aetiology of infarct.  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   :: Not on any inhalers.   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 Major, on 6/15/2020\  pLAN:  -Can consider MRI of the Lumbar and thoracic region  -Hold Duloxetine 30mg daily  -c/w other pain meds, can consider mm relaxant if pain not improving      #Ductal carcinoma in situ (DCIS) of left breast   ::s/p left breast mastectomy on 5/22/19.     ::Follows with breast surgery  Stable        #Urothelial carcinoma of bladder    -S/p TURBT and chemotherapy  Plan:   -Follows with Urologist, Dr. Bella.           #Insomnia  #possible EVA   -trazodone 50 mg at bedtime  -sleep study results pending     #Benign paroxysmal positional vertigo of right ear           Meclizine prn  Follows up with Physical Therapy        F : PRN  E: PRN  N: Adult regular  A: PIV   DVT Prophylaxis: Heparin gtt     Code Status: Full Code (confirmed on admission)   NOK:  Primary Emergency Contact: Kayley Penny, Home Phone: [phone]           Jose Jones MD  PGY-1  [facility] Pager [phone]

## 2023-11-16 ENCOUNTER — APPOINTMENT (OUTPATIENT)
Dept: CARDIOLOGY | Facility: HOSPITAL | Age: 78
DRG: 698 | End: 2023-11-16
Payer: MEDICARE

## 2023-11-16 LAB
ALBUMIN SERPL BCP-MCNC: 3.2 G/DL (ref 3.4–5)
ANION GAP SERPL CALC-SCNC: 15 MMOL/L (ref 10–20)
ATRIAL RATE: 74 BPM
BACTERIA UR CULT: NO GROWTH
BASOPHILS # BLD AUTO: 0.02 X10*3/UL (ref 0–0.1)
BASOPHILS # BLD AUTO: 0.02 X10*3/UL (ref 0–0.1)
BASOPHILS NFR BLD AUTO: 0.3 %
BASOPHILS NFR BLD AUTO: 0.3 %
BUN SERPL-MCNC: 16 MG/DL (ref 6–23)
CALCIUM SERPL-MCNC: 8.7 MG/DL (ref 8.6–10.6)
CHLORIDE SERPL-SCNC: 103 MMOL/L (ref 98–107)
CO2 SERPL-SCNC: 25 MMOL/L (ref 21–32)
CREAT SERPL-MCNC: 1.1 MG/DL (ref 0.5–1.05)
EOSINOPHIL # BLD AUTO: 0.08 X10*3/UL (ref 0–0.4)
EOSINOPHIL # BLD AUTO: 0.13 X10*3/UL (ref 0–0.4)
EOSINOPHIL NFR BLD AUTO: 1.1 %
EOSINOPHIL NFR BLD AUTO: 1.7 %
ERYTHROCYTE [DISTWIDTH] IN BLOOD BY AUTOMATED COUNT: 14.6 % (ref 11.5–14.5)
ERYTHROCYTE [DISTWIDTH] IN BLOOD BY AUTOMATED COUNT: 14.7 % (ref 11.5–14.5)
GFR SERPL CREATININE-BSD FRML MDRD: 52 ML/MIN/1.73M*2
GLUCOSE SERPL-MCNC: 82 MG/DL (ref 74–99)
HCT VFR BLD AUTO: 35 % (ref 36–46)
HCT VFR BLD AUTO: 37.4 % (ref 36–46)
HGB BLD-MCNC: 10.8 G/DL (ref 12–16)
HGB BLD-MCNC: 11.4 G/DL (ref 12–16)
IMM GRANULOCYTES # BLD AUTO: 0.02 X10*3/UL (ref 0–0.5)
IMM GRANULOCYTES # BLD AUTO: 0.03 X10*3/UL (ref 0–0.5)
IMM GRANULOCYTES NFR BLD AUTO: 0.3 % (ref 0–0.9)
IMM GRANULOCYTES NFR BLD AUTO: 0.4 % (ref 0–0.9)
LYMPHOCYTES # BLD AUTO: 1.1 X10*3/UL (ref 0.8–3)
LYMPHOCYTES # BLD AUTO: 1.31 X10*3/UL (ref 0.8–3)
LYMPHOCYTES NFR BLD AUTO: 15.1 %
LYMPHOCYTES NFR BLD AUTO: 17.4 %
MAGNESIUM SERPL-MCNC: 2.26 MG/DL (ref 1.6–2.4)
MCH RBC QN AUTO: 25.1 PG (ref 26–34)
MCH RBC QN AUTO: 25.5 PG (ref 26–34)
MCHC RBC AUTO-ENTMCNC: 30.5 G/DL (ref 32–36)
MCHC RBC AUTO-ENTMCNC: 30.9 G/DL (ref 32–36)
MCV RBC AUTO: 82 FL (ref 80–100)
MCV RBC AUTO: 83 FL (ref 80–100)
MONOCYTES # BLD AUTO: 0.66 X10*3/UL (ref 0.05–0.8)
MONOCYTES # BLD AUTO: 0.77 X10*3/UL (ref 0.05–0.8)
MONOCYTES NFR BLD AUTO: 10.6 %
MONOCYTES NFR BLD AUTO: 8.8 %
NEUTROPHILS # BLD AUTO: 5.29 X10*3/UL (ref 1.6–5.5)
NEUTROPHILS # BLD AUTO: 5.37 X10*3/UL (ref 1.6–5.5)
NEUTROPHILS NFR BLD AUTO: 71.5 %
NEUTROPHILS NFR BLD AUTO: 72.5 %
NRBC BLD-RTO: 0 /100 WBCS (ref 0–0)
NRBC BLD-RTO: 0 /100 WBCS (ref 0–0)
P AXIS: 61 DEGREES
P OFFSET: 215 MS
P ONSET: 149 MS
PHOSPHATE SERPL-MCNC: 3.1 MG/DL (ref 2.5–4.9)
PLATELET # BLD AUTO: 243 X10*3/UL (ref 150–450)
PLATELET # BLD AUTO: 248 X10*3/UL (ref 150–450)
POTASSIUM SERPL-SCNC: 4.1 MMOL/L (ref 3.5–5.3)
PR INTERVAL: 142 MS
Q ONSET: 220 MS
QRS COUNT: 12 BEATS
QRS DURATION: 88 MS
QT INTERVAL: 394 MS
QTC CALCULATION(BAZETT): 437 MS
QTC FREDERICIA: 422 MS
R AXIS: 59 DEGREES
RBC # BLD AUTO: 4.24 X10*6/UL (ref 4–5.2)
RBC # BLD AUTO: 4.54 X10*6/UL (ref 4–5.2)
SODIUM SERPL-SCNC: 139 MMOL/L (ref 136–145)
T AXIS: 63 DEGREES
T OFFSET: 417 MS
UFH PPP CHRO-ACNC: 0.2 IU/ML
VENTRICULAR RATE: 74 BPM
WBC # BLD AUTO: 7.3 X10*3/UL (ref 4.4–11.3)
WBC # BLD AUTO: 7.5 X10*3/UL (ref 4.4–11.3)

## 2023-11-16 PROCEDURE — 2500000004 HC RX 250 GENERAL PHARMACY W/ HCPCS (ALT 636 FOR OP/ED)

## 2023-11-16 PROCEDURE — 99233 SBSQ HOSP IP/OBS HIGH 50: CPT

## 2023-11-16 PROCEDURE — 36415 COLL VENOUS BLD VENIPUNCTURE: CPT

## 2023-11-16 PROCEDURE — 2500000001 HC RX 250 WO HCPCS SELF ADMINISTERED DRUGS (ALT 637 FOR MEDICARE OP)

## 2023-11-16 PROCEDURE — 80069 RENAL FUNCTION PANEL: CPT

## 2023-11-16 PROCEDURE — 85520 HEPARIN ASSAY: CPT

## 2023-11-16 PROCEDURE — 1100000001 HC PRIVATE ROOM DAILY

## 2023-11-16 PROCEDURE — 85025 COMPLETE CBC W/AUTO DIFF WBC: CPT

## 2023-11-16 PROCEDURE — 83735 ASSAY OF MAGNESIUM: CPT

## 2023-11-16 PROCEDURE — 97165 OT EVAL LOW COMPLEX 30 MIN: CPT | Mod: GO

## 2023-11-16 PROCEDURE — 97116 GAIT TRAINING THERAPY: CPT | Mod: GP

## 2023-11-16 PROCEDURE — 93005 ELECTROCARDIOGRAM TRACING: CPT

## 2023-11-16 RX ORDER — LOSARTAN POTASSIUM 25 MG/1
25 TABLET ORAL DAILY
Status: DISCONTINUED | OUTPATIENT
Start: 2023-11-16 | End: 2023-11-17 | Stop reason: HOSPADM

## 2023-11-16 RX ORDER — GUAIFENESIN 600 MG/1
600 TABLET, EXTENDED RELEASE ORAL 2 TIMES DAILY PRN
Status: DISCONTINUED | OUTPATIENT
Start: 2023-11-16 | End: 2023-11-17 | Stop reason: HOSPADM

## 2023-11-16 RX ADMIN — METOPROLOL TARTRATE 25 MG: 50 TABLET, FILM COATED ORAL at 08:27

## 2023-11-16 RX ADMIN — ASPIRIN 81 MG: 81 TABLET, COATED ORAL at 08:27

## 2023-11-16 RX ADMIN — APIXABAN 5 MG: 5 TABLET, FILM COATED ORAL at 21:17

## 2023-11-16 RX ADMIN — AMLODIPINE BESYLATE 5 MG: 5 TABLET ORAL at 08:27

## 2023-11-16 RX ADMIN — HEPARIN SODIUM 700 UNITS/HR: 10000 INJECTION, SOLUTION INTRAVENOUS at 08:32

## 2023-11-16 RX ADMIN — APIXABAN 5 MG: 5 TABLET, FILM COATED ORAL at 11:20

## 2023-11-16 RX ADMIN — TRAZODONE HYDROCHLORIDE 50 MG: 50 TABLET ORAL at 21:11

## 2023-11-16 RX ADMIN — GUAIFENESIN 600 MG: 600 TABLET ORAL at 04:08

## 2023-11-16 RX ADMIN — ROSUVASTATIN CALCIUM 5 MG: 10 TABLET, FILM COATED ORAL at 21:11

## 2023-11-16 RX ADMIN — LOSARTAN POTASSIUM 25 MG: 25 TABLET, FILM COATED ORAL at 11:20

## 2023-11-16 RX ADMIN — METOPROLOL TARTRATE 25 MG: 50 TABLET, FILM COATED ORAL at 21:11

## 2023-11-16 RX ADMIN — ACETAMINOPHEN 975 MG: 325 TABLET ORAL at 21:15

## 2023-11-16 RX ADMIN — ACETAMINOPHEN 975 MG: 325 TABLET ORAL at 04:08

## 2023-11-16 ASSESSMENT — COGNITIVE AND FUNCTIONAL STATUS - GENERAL
TOILETING: A LITTLE
DRESSING REGULAR LOWER BODY CLOTHING: A LITTLE
STANDING UP FROM CHAIR USING ARMS: A LITTLE
PERSONAL GROOMING: A LITTLE
WALKING IN HOSPITAL ROOM: A LITTLE
EATING MEALS: A LITTLE
MOBILITY SCORE: 17
HELP NEEDED FOR BATHING: A LITTLE
DRESSING REGULAR UPPER BODY CLOTHING: A LITTLE
MOVING FROM LYING ON BACK TO SITTING ON SIDE OF FLAT BED WITH BEDRAILS: A LITTLE
CLIMB 3 TO 5 STEPS WITH RAILING: A LOT
MOVING TO AND FROM BED TO CHAIR: A LITTLE
DAILY ACTIVITIY SCORE: 18
TURNING FROM BACK TO SIDE WHILE IN FLAT BAD: A LITTLE

## 2023-11-16 ASSESSMENT — PAIN SCALES - GENERAL
PAINLEVEL_OUTOF10: 5 - MODERATE PAIN
PAINLEVEL_OUTOF10: 0 - NO PAIN
PAINLEVEL_OUTOF10: 3
PAINLEVEL_OUTOF10: 4
PAINLEVEL_OUTOF10: 3

## 2023-11-16 ASSESSMENT — PAIN - FUNCTIONAL ASSESSMENT
PAIN_FUNCTIONAL_ASSESSMENT: 0-10

## 2023-11-16 ASSESSMENT — ACTIVITIES OF DAILY LIVING (ADL): ADL_ASSISTANCE: INDEPENDENT

## 2023-11-16 NOTE — PROGRESS NOTES
Jazzy Schaeffer is a 78 y.o. female on day 4 of admission presenting with Renal infarction (CMS/HCC).    Subjective   No acute events overnight. Pt this morning at renal duplex ultrasound. Pt otherwise denied any weakness, headaches, numbness, tingling, or neurologic changes. Pt denying nausea, vomiting, fevers/chills, urinary symptoms, chest pain, SOB.     Pt endorsed she would be okay with choosing SNF facility or possible home care.    On discussion during rounds, pt noted darker stools but not black stools.    Objective     Physical Exam  Vitals reviewed.   Constitutional:       General: She is not in acute distress.     Appearance: Normal appearance. She is normal weight.   HENT:      Head: Normocephalic and atraumatic.      Right Ear: Tympanic membrane normal.      Left Ear: Tympanic membrane normal.      Nose: Nose normal.      Mouth/Throat:      Mouth: Mucous membranes are moist.      Pharynx: Oropharynx is clear.   Eyes:      Extraocular Movements: Extraocular movements intact.      Conjunctiva/sclera: Conjunctivae normal.      Pupils: Pupils are equal, round, and reactive to light.   Cardiovascular:      Rate and Rhythm: Normal rate and regular rhythm.      Pulses: Normal pulses.      Heart sounds: Normal heart sounds. No murmur heard.     No friction rub. No gallop.   Pulmonary:      Effort: Pulmonary effort is normal. No respiratory distress.      Breath sounds: Normal breath sounds. No wheezing, rhonchi or rales.   Chest:      Chest wall: No tenderness.   Abdominal:      General: Abdomen is flat. Bowel sounds are normal. There is no distension.      Palpations: Abdomen is soft. There is no mass.      Tenderness: There is no abdominal tenderness. There is no right CVA tenderness, left CVA tenderness, guarding or rebound.      Hernia: No hernia is present.   Musculoskeletal:         General: Normal range of motion.   Skin:     General: Skin is warm and dry.      Capillary Refill: Capillary refill takes  "less than 2 seconds.   Neurological:      General: No focal deficit present.      Mental Status: She is alert and oriented to person, place, and time. Mental status is at baseline.      Cranial Nerves: No cranial nerve deficit.      Sensory: No sensory deficit.      Motor: No weakness.      Coordination: Coordination normal.      Gait: Gait normal.      Deep Tendon Reflexes: Reflexes normal.   Psychiatric:         Mood and Affect: Mood normal.         Behavior: Behavior normal.         Thought Content: Thought content normal.         Judgment: Judgment normal.         Last Recorded Vitals  Blood pressure 148/80, pulse 77, temperature 36.7 °C (98.1 °F), temperature source Temporal, resp. rate 18, height 1.651 m (5' 5\"), weight 83.9 kg (185 lb), SpO2 99 %.  Intake/Output last 3 Shifts:  I/O last 3 completed shifts:  In: 2072 (24.7 mL/kg) [P.O.:1440; I.V.:132 (1.6 mL/kg); IV Piggyback:500]  Out: 600 (7.2 mL/kg) [Urine:600 (0.2 mL/kg/hr)]  Weight: 83.9 kg     Relevant Results  Scheduled medications  acetaminophen, 975 mg, oral, q8h  amLODIPine, 5 mg, oral, Daily  apixaban, 5 mg, oral, q12h  aspirin, 81 mg, oral, Daily  furosemide, 20 mg, oral, Every Mon/Wed/Fri  losartan, 25 mg, oral, Daily  metoprolol tartrate, 25 mg, oral, BID  polyethylene glycol, 17 g, oral, Daily  rosuvastatin, 5 mg, oral, Nightly      Continuous medications  heparin, 0-4,000 Units/hr, Last Rate: 700 Units/hr (11/16/23 0832)      PRN medications  PRN medications: guaiFENesin, heparin, meclizine, ondansetron, oxyCODONE, traZODone    Results for orders placed or performed during the hospital encounter of 11/12/23 (from the past 24 hour(s))   CBC and Auto Differential   Result Value Ref Range    WBC 7.5 4.4 - 11.3 x10*3/uL    nRBC 0.0 0.0 - 0.0 /100 WBCs    RBC 4.24 4.00 - 5.20 x10*6/uL    Hemoglobin 10.8 (L) 12.0 - 16.0 g/dL    Hematocrit 35.0 (L) 36.0 - 46.0 %    MCV 83 80 - 100 fL    MCH 25.5 (L) 26.0 - 34.0 pg    MCHC 30.9 (L) 32.0 - 36.0 g/dL    " RDW 14.6 (H) 11.5 - 14.5 %    Platelets 243 150 - 450 x10*3/uL    Neutrophils % 71.5 40.0 - 80.0 %    Immature Granulocytes %, Automated 0.3 0.0 - 0.9 %    Lymphocytes % 17.4 13.0 - 44.0 %    Monocytes % 8.8 2.0 - 10.0 %    Eosinophils % 1.7 0.0 - 6.0 %    Basophils % 0.3 0.0 - 2.0 %    Neutrophils Absolute 5.37 1.60 - 5.50 x10*3/uL    Immature Granulocytes Absolute, Automated 0.02 0.00 - 0.50 x10*3/uL    Lymphocytes Absolute 1.31 0.80 - 3.00 x10*3/uL    Monocytes Absolute 0.66 0.05 - 0.80 x10*3/uL    Eosinophils Absolute 0.13 0.00 - 0.40 x10*3/uL    Basophils Absolute 0.02 0.00 - 0.10 x10*3/uL   Magnesium   Result Value Ref Range    Magnesium 2.26 1.60 - 2.40 mg/dL   Renal Function Panel   Result Value Ref Range    Glucose 82 74 - 99 mg/dL    Sodium 139 136 - 145 mmol/L    Potassium 4.1 3.5 - 5.3 mmol/L    Chloride 103 98 - 107 mmol/L    Bicarbonate 25 21 - 32 mmol/L    Anion Gap 15 10 - 20 mmol/L    Urea Nitrogen 16 6 - 23 mg/dL    Creatinine 1.10 (H) 0.50 - 1.05 mg/dL    eGFR 52 (L) >60 mL/min/1.73m*2    Calcium 8.7 8.6 - 10.6 mg/dL    Phosphorus 3.1 2.5 - 4.9 mg/dL    Albumin 3.2 (L) 3.4 - 5.0 g/dL   Heparin Assay, UFH   Result Value Ref Range    Heparin Unfractionated 0.2 See Comment Below for Therapeutic Ranges IU/mL     CT head wo IV contrast   Final Result   There is an area of encephalomalacia again noted along the medial   left occipital lobe.        There is again evidence of similar mild brain parenchymal volume loss.        Scattered nonspecific white matter changes are again noted within the   cerebral hemispheres bilaterally as well as vague hypodensity   overlying the brainstem which while nonspecific, given the patient's   age, likely represent sequelae of small-vessel ischemic change.   Additional punctate hypodensities are identified within the   subinsular regions and basal ganglia bilaterally suggesting   incidental mildly prominent perivascular spaces and/or small   scattered lacunar  infarctions.        MACRO:   None.        Signed by: Randy Grey 11/15/2023 1:55 PM   Dictation workstation:   [id number]      Vascular US renal artery duplex complete   Final Result      Transthoracic Echo (TTE) Complete   Final Result      CT abdomen pelvis w IV contrast   Final Result   1.  Interval development of large wedge-shaped hypoenhancing areas in   the mid to inferior portions of the right kidney with no significant   associated fat stranding or urothelial thickening. Findings   concerning for right renal infarction. Pyelonephritis is a   possibility, for correlation with urinalysis. The right renal artery   and vein appear patent within the limits of the current exam.   2. Small hiatal hernia. Apparent gastric wall thickening which can be   secondary to nondistention versus mild gastritis.   3. Additional chronic findings are as described above.        I personally reviewed the images/study and I agree with the findings   as stated by Resident Sebastien Xiong MD. This study was interpreted   at [place], [place].        MACRO:   None        Signed by: Valeri Greenfield 11/12/2023 6:15 PM   Dictation workstation:   [id number]      XR chest 2 views   Final Result   No acute cardiopulmonary process.   Signed by Lalo Garduno MD              Assessment/Plan   Principal Problem:    Renal infarction (CMS/HCC)    78-year-old female with past medical history of CVA (eliquis), GERD, gastritis, BPPV, COPD/chronic bronchitis(not on inhalers), paroxysmal AF, diverticulitis, breast cancer, HTN, HLD, and CHF (EF 45%) that presents today for Rt sided non-colicky flank pain.Vitals stable with tenderness in the RT CVA, Labs with no leucoytosis, hematuria, neg UA. CT AP with renal infarct and patent renal artery and veins. Pts Clinical presentation unlikely renal colic (as pt pain not colicky, has no hematuria,)Pyelonephritis unlikely as pt afebrile and with no leucocytosis.  Renal infarction likely thrombo embolic iso hcx of paroxysmal AF and questionable compliance to eliquis.    11/16 Updates:  -Transition to home Eliquis dose for afib  -Renal function improving  -Acute Hb drop 11.8 -> 10.8  -Monitor stool  -PM CBC    ##Renal infarct  #Pyelonephritis or Nephrolithiasis unlikely  #Hx of paroxysmal AF  #NASIM  -Fu Echo (TTE/ELA) in am, antiphospholid screen, Protein C and S, antithrombin to determine aetiology of infarct.  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   :: Not on any inhalers.   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 Major, on 6/15/2020\  pLAN:  -Can consider MRI of the Lumbar and thoracic region  -Hold Duloxetine 30mg daily  -c/w other pain meds, can consider mm relaxant if pain not improving      #Ductal carcinoma in situ (DCIS) of left breast   ::s/p left breast mastectomy on 5/22/19.     ::Follows with breast surgery  Stable        #Urothelial carcinoma of bladder    -S/p TURBT and chemotherapy  Plan:   -Follows with Urologist, Dr. Bella.          #Insomnia  #possible EVA   -trazodone 50 mg at bedtime     #Benign paroxysmal positional vertigo of right ear           Meclizine  prn  Follows up with Physical Therapy        F : PRN  E: PRN  N: Adult regular  A: PIV   DVT Prophylaxis: Eliquis     Code Status: Full Code (confirmed on admission)   NOK:  Primary Emergency Contact: Kayley Penny, Home Phone: [phone]           Jose Jones MD  PGY-1  [facility] Pager [phone]

## 2023-11-16 NOTE — PROGRESS NOTES
Physical Therapy    Physical Therapy Treatment    Patient Name: Jazzy Schaeffer  MRN: [id number]  Today's Date: 11/16/2023  Time Calculation  Start Time: 1637  Stop Time: 1647  Time Calculation (min): 10 min       Assessment/Plan   PT Assessment  PT Assessment Results: Decreased strength, Decreased endurance, Impaired balance, Decreased mobility  End of Session Communication: Bedside nurse  Assessment Comment: Pt demos higher level balance and endurance deficits.  Will continue to benefit from skilled PT to improve with overall functional mobility.  End of Session Patient Position: Alarm off, not on at start of session (seated EOB with needs in reach)     PT Discharge Recommendations: Moderate intensity level of continued care    General Visit Information:   PT  Visit  PT Received On: 11/16/23  General  Family/Caregiver Present: Yes  Caregiver Feedback: sister present  Prior to Session Communication: Bedside nurse  General Comment: Pt agreeable to participate, reports she worked with therapy earlier (OT).  Ambulated without whw this session, demos unsteadiness, completed ther ex. Will continue to follow.    Subjective   Precautions:  Precautions  Medical Precautions: Fall precautions        Cognition:  Cognition  Overall Cognitive Status: Within Functional Limits    Activity Tolerance:  Activity Tolerance  Endurance: Tolerates 10 - 20 min exercise with multiple rests  Activity Tolerance Comments: reports her hips felt sore post ambulation  Treatments:  Therapeutic Exercise  Therapeutic Exercise Performed: Yes  Therapeutic Exercise Activity 1: (B) LE ankle pumps x 10  Therapeutic Exercise Activity 2: (B) LAQ x 10 reps (cues and demo for slow controlled performance)  Therapeutic Exercise Activity 3: educated pt on heel slides when she returns to bed, pt verbalizes understanding     Outcome Measures:  [facility] Basic Mobility  Turning from your back to your side while in a flat bed without using bedrails: A little  Moving  from lying on your back to sitting on the side of a flat bed without using bedrails: A little  Moving to and from bed to chair (including a wheelchair): A little  Standing up from a chair using your arms (e.g. wheelchair or bedside chair): A little  To walk in hospital room: A little  Climbing 3-5 steps with railing: A lot  Basic Mobility - Total Score: 17    [place]  Sitting Balance: Steady, safe  Arises: Able, uses arms to help  Attempts to Arise: Able to arise, one attempt  Immediate Standing Balance (First 5 Seconds): Steady but uses walker or other support  Standing Balance: Steady but wide stance, uses cane or other support  Nudged: Staggers, grabs, catches self  Eyes Closed: Unsteady  Turned 360 Degrees: Steadiness: Unsteady (Grabs, staggers)  Turned 360 Degrees: Continuity of Steps: Discontinuous steps  Sitting Down: Uses arms or not a smooth motion  Balance Score: 8  Initiation of Gait: No hesitancy  Step Height: R Swing Foot: Right foot complete clears floor  Step Length: R Swing Foot: Passes left stance foot  Step Height: L Swing Foot: Left foot complete clears floor  Step Length: L Swing Foot: Passes right stance foot  Step Symmetry: Right and left step appear equal  Step Continuity: Stopping or discontinuity between steps  Path: Mild/moderate deviation or uses walking aid  Trunk: Marked sway or uses walking aid  Walking Time: Heels almost touching while walking  Gait Score: 8  Total Score: 16    Education Documentation  Mobility Training, taught by Felicity Humphrey, PT at 11/16/2023  5:21 PM.  Learner: Patient  Readiness: Acceptance  Method: Explanation, Demonstration  Response: Verbalizes Understanding, Demonstrated Understanding       Encounter Problems       Encounter Problems (Active)       Balance       STG - Maintains static sitting balance with upper extremity support using LRD and SBA (Progressing)       Start:  11/15/23    Expected End:  11/29/23               Mobility       STG - Patient  will ambulate >100' with LRD, SBA, & VSS (Progressing)       Start:  11/15/23    Expected End:  11/29/23            STG - Patient will ascend and descend four stairs with HR and CGA (Progressing)       Start:  11/15/23    Expected End:  11/29/23               Transfers       STG - Patient to transfer to and from sit to supine indep (Progressing)       Start:  11/15/23    Expected End:  11/29/23            STG - Patient will transfer sit to and from stand with SBA (Progressing)       Start:  11/15/23    Expected End:  11/29/23 11/16/23 at 5:23 PM - Felicity Humphrey, PT n

## 2023-11-16 NOTE — PROGRESS NOTES
Occupational Therapy    Evaluation    Patient Name: Jazzy Schaeffer  MRN: [id number]  Today's Date: 11/16/2023  Time Calculation  Start Time: 1230  Stop Time: 1241  Time Calculation (min): 11 min    Plan:  OT Frequency: 3 times per week  OT Discharge Recommendations: Moderate intensity level of continued care  OT Recommended Transfer Status: Assist of 1 (FWW)  OT - OK to Discharge:  (OT Eval completed.)    Subjective   Current Problem:  1. Renal infarction (CMS/HCC)  Vascular US renal artery duplex complete    Vascular US renal artery duplex complete    Referral to Cardiology    Referral to Primary Care    Referral to Vascular Medicine    Monitor intake and output      2. Elevated troponin        3. Acute on chronic congestive heart failure, unspecified heart failure type (CMS/HCC)        4. Paroxysmal atrial fibrillation (CMS/HCC)  Transthoracic Echo (TTE) Complete    Transthoracic Echo (TTE) Complete    Referral to Cardiology    Referral to Primary Care    Referral to Vascular Medicine    CANCELED: Transthoracic Echo (TTE) Complete    CANCELED: Transthoracic Echo (TTE) Complete      5. History of cardioembolic stroke  Transthoracic Echo (TTE) Complete    Transthoracic Echo (TTE) Complete    Referral to Cardiology    Referral to Primary Care    Referral to Vascular Medicine    CANCELED: Transthoracic Echo (TTE) Complete    CANCELED: Transthoracic Echo (TTE) Complete      6. Right flank pain        7. Atherosclerosis of renal artery (CMS/HCC)  Vascular US renal artery duplex complete        General:  Reason for Referral: Rt sided non-colicky flank pain; CT A/P revealed a renal infarct.  Past Medical History Relevant to Rehab: CVA (eliquis), GERD, gastritis, BPPV, COPD/chronic bronchitis(not on inhalers), paroxysmal AF, diverticulitis, breast cancer, HTN, HLD, and CHF (EF 45%)  Prior to Session Communication: Bedside nurse  Patient Position Received: Bed, 2 rail up, Alarm off, not on at start of session  General  "Comment: Pt received in supine, pleasant and cooperative. Agreeable to OT. Pt c/o fatigue with minimal activity, would benefit from moderate intensity OT upon discharge.   Precautions:  Medical Precautions: Fall precautions  Vital Signs:     Pain:  Pain Assessment  Pain Score: 4  Pain Location:  (Low Back)    Objective   Cognition:  Orientation Level: Oriented X4  Processing Speed:  (Pt follows one step simple commands 90% of time or greater, mildly delayed processing at times, will conintue to assess.)           Home Living:  Type of Home: House  Lives With:  (Gali, works days)  Home Adaptive Equipment:  (Pt reports she owns a SPC, however, typically does not use it. 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 CGA for clothing management in standing.)  Activity Tolerance:  Endurance: Tolerates 10 - 20 min exercise with multiple rests  Activity Tolerance Comments: Pt with c/o fatigue throughout session.  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 Pt reports h/o right field cut from CVA. Pt appeared to be compensating appropriately during functional tasks in session, TBE further.)   and    Sensation:  Light Touch:  (Grossly intact to light touch, however, pt c/o baseline decreased sensation throughout B/L hands.)  Strength:  Strength Comments: Grossly 4 to 4+/5 throughout B/L UE's.      Hand Function:  Hand Function  Gross Grasp: Functional  Extremities: RUE   RUE : Within Functional Limits, LUE   LUE: Within Functional Limits,  , and      Outcome Measures: [facility] Daily Activity  Putting on and taking off regular lower body clothing: A little  Bathing (including washing, rinsing, drying): A little  Putting on and taking off regular upper body clothing: A little  Toileting, which includes using toilet, bedpan or urinal: A little  Taking care of personal grooming such as brushing teeth: A little  Eating Meals: A little  Daily Activity - Total Score: 18         ,     OT Adult Other Outcome Measures  4AT: 1    Education Documentation  Precautions, taught by Sharmin Andres OT at 11/16/2023  1:15 PM.  Learner: Patient  Readiness: Acceptance  Method: Explanation  Response: Needs Reinforcement    ADL Training, taught by Sharmin Andres OT at 11/16/2023  1:15 PM.  Learner: Patient  Readiness: Acceptance  Method: Explanation  Response: Needs Reinforcement    Education Comments  No comments found.        Goals:   Encounter Problems       Encounter Problems (Active)       ADLs       Patient will perform UB and LB bathing  with modified independent level of assistance and extended tub bench. (Progressing)       Start:  11/16/23     Expected End:  11/30/23            Patient with complete upper body dressing with independent level of assistance donning and doffing all UE clothes with PRN adaptive equipment while edge of bed  and standing (Progressing)       Start:  11/16/23    Expected End:  11/30/23            Patient with complete lower body dressing with modified independent level of assistance donning and doffing all LE clothes  with PRN adaptive equipment while edge of bed  and standing (Progressing)       Start:  11/16/23    Expected End:  11/30/23            Patient will complete toileting including hygiene clothing management/hygiene with modified independent level of assistance and raised toilet seat. (Progressing)       Start:  11/16/23    Expected End:  11/30/23               Balance       STG - Maintains static sitting balance with upper extremity support using LRD and SBA (Progressing)       Start:  11/15/23    Expected End:  11/29/23               MOBILITY       Patient will perform Functional mobility  Household distances/Community Distances with modified independent level of assistance and least restrictive device in order to improve safety and functional mobility. (Progressing)       Start:  11/16/23    Expected End:  11/30/23               Mobility       STG - Patient will ambulate >100' with LRD, SBA, & VSS (Progressing)       Start:  11/15/23    Expected End:  11/29/23            STG - Patient will ascend and descend four stairs with HR and CGA (Progressing)       Start:  11/15/23    Expected End:  11/29/23               TRANSFERS       Patient will perform bed mobility independent level of assistance  in order to improve safety and independence with mobility (Progressing)       Start:  11/16/23    Expected End:  11/30/23            Patient will complete functional transfer to all surfaces with least restrictive device with modified independent level of assistance. (Progressing)       Start:  11/16/23    Expected End:   11/30/23               Transfers       STG - Patient to transfer to and from sit to supine indep (Progressing)       Start:  11/15/23    Expected End:  11/29/23            STG - Patient will transfer sit to and from stand with SBA (Progressing)       Start:  11/15/23    Expected End:  11/29/23 11/16/23 at 1:17 PM - Sharmin Andres OT       11/16/23 at 1:16 PM   Sharmin Andres OT   Rehab Office: [phone]

## 2023-11-16 NOTE — PROGRESS NOTES
Transitional Care Coordination Progress Note:  Patient discussed during interdisciplinary rounds.  Team members present: MD, TCC  Plan per Medical/Surgical team: Plan for renal ultrasound today, transition pt from IV hep gtt to home Eliquis.   Payer: [employer]  Status: Inpatient  Discharge disposition: new SNF pending facility choices from pt/ sister Kayley.  Potential Barriers: none  ADOD: 11/17  Spoke with sister Kayley [phone] regarding discharge dispo and Kayley confirmed pt is agreeable to SNF. Kayley provided with SNF list via email [email or web address] per her request. Kayley agreed to review SNF list and pt today and call me back with SNF preferences. Precert will be required prior to discharge. Care coordinator will continue to follow for discharge planning needs.     Addendum [phone]: Sister Kayley called me back and provided the following SNF preferences:  [facility]  [facility]  [facility]  Referral sent to above facilities with clinicals via [facility] for review.     Addendum [id number]: Pt confirmed [facility] accept. Request for precert submission sent to direct precert team to start precert. Medical team updated of above.     Alka Asif, MICHELLE  Transitional Care Coordinator/TCC  [phone]

## 2023-11-17 VITALS
RESPIRATION RATE: 18 BRPM | HEART RATE: 69 BPM | OXYGEN SATURATION: 99 % | TEMPERATURE: 97.5 F | BODY MASS INDEX: 30.82 KG/M2 | HEIGHT: 65 IN | WEIGHT: 185 LBS | SYSTOLIC BLOOD PRESSURE: 142 MMHG | DIASTOLIC BLOOD PRESSURE: 64 MMHG

## 2023-11-17 LAB
ALBUMIN SERPL BCP-MCNC: 3.2 G/DL (ref 3.4–5)
ANION GAP SERPL CALC-SCNC: 9 MMOL/L (ref 10–20)
BASOPHILS # BLD AUTO: 0.03 X10*3/UL (ref 0–0.1)
BASOPHILS NFR BLD AUTO: 0.5 %
BUN SERPL-MCNC: 13 MG/DL (ref 6–23)
CALCIUM SERPL-MCNC: 8.5 MG/DL (ref 8.6–10.6)
CHLORIDE SERPL-SCNC: 106 MMOL/L (ref 98–107)
CO2 SERPL-SCNC: 25 MMOL/L (ref 21–32)
CREAT SERPL-MCNC: 1.08 MG/DL (ref 0.5–1.05)
EOSINOPHIL # BLD AUTO: 0.17 X10*3/UL (ref 0–0.4)
EOSINOPHIL NFR BLD AUTO: 2.8 %
ERYTHROCYTE [DISTWIDTH] IN BLOOD BY AUTOMATED COUNT: 14.7 % (ref 11.5–14.5)
GFR SERPL CREATININE-BSD FRML MDRD: 53 ML/MIN/1.73M*2
GLUCOSE SERPL-MCNC: 99 MG/DL (ref 74–99)
HCT VFR BLD AUTO: 35.3 % (ref 36–46)
HGB BLD-MCNC: 10.7 G/DL (ref 12–16)
IMM GRANULOCYTES # BLD AUTO: 0.01 X10*3/UL (ref 0–0.5)
IMM GRANULOCYTES NFR BLD AUTO: 0.2 % (ref 0–0.9)
LYMPHOCYTES # BLD AUTO: 1.4 X10*3/UL (ref 0.8–3)
LYMPHOCYTES NFR BLD AUTO: 23.1 %
MAGNESIUM SERPL-MCNC: 2.23 MG/DL (ref 1.6–2.4)
MCH RBC QN AUTO: 25.4 PG (ref 26–34)
MCHC RBC AUTO-ENTMCNC: 30.3 G/DL (ref 32–36)
MCV RBC AUTO: 84 FL (ref 80–100)
MONOCYTES # BLD AUTO: 0.78 X10*3/UL (ref 0.05–0.8)
MONOCYTES NFR BLD AUTO: 12.9 %
NEUTROPHILS # BLD AUTO: 3.66 X10*3/UL (ref 1.6–5.5)
NEUTROPHILS NFR BLD AUTO: 60.5 %
NRBC BLD-RTO: 0 /100 WBCS (ref 0–0)
PHOSPHATE SERPL-MCNC: 3 MG/DL (ref 2.5–4.9)
PLATELET # BLD AUTO: 246 X10*3/UL (ref 150–450)
POTASSIUM SERPL-SCNC: 3.8 MMOL/L (ref 3.5–5.3)
RBC # BLD AUTO: 4.21 X10*6/UL (ref 4–5.2)
SODIUM SERPL-SCNC: 136 MMOL/L (ref 136–145)
WBC # BLD AUTO: 6.1 X10*3/UL (ref 4.4–11.3)

## 2023-11-17 PROCEDURE — 85025 COMPLETE CBC W/AUTO DIFF WBC: CPT

## 2023-11-17 PROCEDURE — 83735 ASSAY OF MAGNESIUM: CPT

## 2023-11-17 PROCEDURE — 36415 COLL VENOUS BLD VENIPUNCTURE: CPT

## 2023-11-17 PROCEDURE — 80069 RENAL FUNCTION PANEL: CPT

## 2023-11-17 PROCEDURE — 99239 HOSP IP/OBS DSCHRG MGMT >30: CPT

## 2023-11-17 PROCEDURE — 2500000001 HC RX 250 WO HCPCS SELF ADMINISTERED DRUGS (ALT 637 FOR MEDICARE OP)

## 2023-11-17 RX ORDER — LOSARTAN POTASSIUM 25 MG/1
25 TABLET ORAL DAILY
Start: 2023-11-18 | End: 2024-01-10 | Stop reason: SDUPTHER

## 2023-11-17 RX ORDER — POLYETHYLENE GLYCOL 3350 17 G/17G
17 POWDER, FOR SOLUTION ORAL DAILY
Start: 2023-11-17 | End: 2024-02-06 | Stop reason: WASHOUT

## 2023-11-17 RX ORDER — GUAIFENESIN 600 MG/1
600 TABLET, EXTENDED RELEASE ORAL 2 TIMES DAILY PRN
Start: 2023-11-17 | End: 2024-03-28 | Stop reason: ALTCHOICE

## 2023-11-17 RX ADMIN — LOSARTAN POTASSIUM 25 MG: 25 TABLET, FILM COATED ORAL at 08:41

## 2023-11-17 RX ADMIN — ACETAMINOPHEN 975 MG: 325 TABLET ORAL at 05:51

## 2023-11-17 RX ADMIN — AMLODIPINE BESYLATE 5 MG: 5 TABLET ORAL at 08:40

## 2023-11-17 RX ADMIN — OXYCODONE HYDROCHLORIDE 5 MG: 5 TABLET ORAL at 05:59

## 2023-11-17 RX ADMIN — FUROSEMIDE 20 MG: 40 TABLET ORAL at 08:41

## 2023-11-17 RX ADMIN — APIXABAN 5 MG: 5 TABLET, FILM COATED ORAL at 08:40

## 2023-11-17 RX ADMIN — METOPROLOL TARTRATE 25 MG: 50 TABLET, FILM COATED ORAL at 08:41

## 2023-11-17 RX ADMIN — ASPIRIN 81 MG: 81 TABLET, COATED ORAL at 08:41

## 2023-11-17 ASSESSMENT — COGNITIVE AND FUNCTIONAL STATUS - GENERAL
DAILY ACTIVITIY SCORE: 22
MOBILITY SCORE: 22
DRESSING REGULAR LOWER BODY CLOTHING: A LITTLE
HELP NEEDED FOR BATHING: A LITTLE
WALKING IN HOSPITAL ROOM: A LITTLE
CLIMB 3 TO 5 STEPS WITH RAILING: A LITTLE

## 2023-11-17 ASSESSMENT — PAIN - FUNCTIONAL ASSESSMENT
PAIN_FUNCTIONAL_ASSESSMENT: 0-10

## 2023-11-17 ASSESSMENT — PAIN SCALES - GENERAL
PAINLEVEL_OUTOF10: 7
PAINLEVEL_OUTOF10: 4
PAINLEVEL_OUTOF10: 3

## 2023-11-17 ASSESSMENT — PAIN DESCRIPTION - LOCATION: LOCATION: HEAD

## 2023-11-17 NOTE — PROGRESS NOTES
Jazzy Schaeffer is a 78 y.o. female on day 5 of admission presenting with Renal infarction (CMS/HCC).    Subjective   No acute events overnight. Pt this morning at renal duplex ultrasound. Pt otherwise denied any weakness, headaches, numbness, tingling, or neurologic changes. Pt denying nausea, vomiting, fevers/chills, urinary symptoms, chest pain, SOB.     Pt endorsed she would be okay with choosing SNF facility or possible home care.    Brown stools.     Objective     Physical Exam  Vitals reviewed.   Constitutional:       General: She is not in acute distress.     Appearance: Normal appearance. She is normal weight.   HENT:      Head: Normocephalic and atraumatic.      Right Ear: Tympanic membrane normal.      Left Ear: Tympanic membrane normal.      Nose: Nose normal.      Mouth/Throat:      Mouth: Mucous membranes are moist.      Pharynx: Oropharynx is clear.   Eyes:      Extraocular Movements: Extraocular movements intact.      Conjunctiva/sclera: Conjunctivae normal.      Pupils: Pupils are equal, round, and reactive to light.   Cardiovascular:      Rate and Rhythm: Normal rate and regular rhythm.      Pulses: Normal pulses.      Heart sounds: Normal heart sounds. No murmur heard.     No friction rub. No gallop.   Pulmonary:      Effort: Pulmonary effort is normal. No respiratory distress.      Breath sounds: Normal breath sounds. No wheezing, rhonchi or rales.   Chest:      Chest wall: No tenderness.   Abdominal:      General: Abdomen is flat. Bowel sounds are normal. There is no distension.      Palpations: Abdomen is soft. There is no mass.      Tenderness: There is no abdominal tenderness. There is no right CVA tenderness, left CVA tenderness, guarding or rebound.      Hernia: No hernia is present.   Musculoskeletal:         General: Normal range of motion.   Skin:     General: Skin is warm and dry.      Capillary Refill: Capillary refill takes less than 2 seconds.   Neurological:      General: No focal  "deficit present.      Mental Status: She is alert and oriented to person, place, and time. Mental status is at baseline.      Cranial Nerves: No cranial nerve deficit.      Sensory: No sensory deficit.      Motor: No weakness.      Coordination: Coordination normal.      Gait: Gait normal.      Deep Tendon Reflexes: Reflexes normal.   Psychiatric:         Mood and Affect: Mood normal.         Behavior: Behavior normal.         Thought Content: Thought content normal.         Judgment: Judgment normal.         Last Recorded Vitals  Blood pressure 142/64, pulse 69, temperature 36.4 °C (97.5 °F), temperature source Temporal, resp. rate 18, height 1.651 m (5' 5\"), weight 83.9 kg (185 lb), SpO2 99 %.  Intake/Output last 3 Shifts:  I/O last 3 completed shifts:  In: 720 (8.6 mL/kg) [P.O.:720]  Out: 700 (8.3 mL/kg) [Urine:700 (0.2 mL/kg/hr)]  Weight: 83.9 kg     Relevant Results  Scheduled medications  acetaminophen, 975 mg, oral, q8h  amLODIPine, 5 mg, oral, Daily  apixaban, 5 mg, oral, q12h  aspirin, 81 mg, oral, Daily  furosemide, 20 mg, oral, Every Mon/Wed/Fri  losartan, 25 mg, oral, Daily  metoprolol tartrate, 25 mg, oral, BID  polyethylene glycol, 17 g, oral, Daily  rosuvastatin, 5 mg, oral, Nightly      Continuous medications       PRN medications  PRN medications: guaiFENesin, heparin, meclizine, ondansetron, oxyCODONE, traZODone    Results for orders placed or performed during the hospital encounter of 11/12/23 (from the past 24 hour(s))   ECG 12 lead   Result Value Ref Range    Ventricular Rate 74 BPM    Atrial Rate 74 BPM    CT Interval 142 ms    QRS Duration 88 ms    QT Interval 394 ms    QTC Calculation(Bazett) 437 ms    P Axis 61 degrees    R Axis 59 degrees    T Axis 63 degrees    QRS Count 12 beats    Q Onset 220 ms    P Onset 149 ms    P Offset 215 ms    T Offset 417 ms    QTC Fredericia 422 ms   CBC and Auto Differential   Result Value Ref Range    WBC 7.3 4.4 - 11.3 x10*3/uL    nRBC 0.0 0.0 - 0.0 /100 " WBCs    RBC 4.54 4.00 - 5.20 x10*6/uL    Hemoglobin 11.4 (L) 12.0 - 16.0 g/dL    Hematocrit 37.4 36.0 - 46.0 %    MCV 82 80 - 100 fL    MCH 25.1 (L) 26.0 - 34.0 pg    MCHC 30.5 (L) 32.0 - 36.0 g/dL    RDW 14.7 (H) 11.5 - 14.5 %    Platelets 248 150 - 450 x10*3/uL    Neutrophils % 72.5 40.0 - 80.0 %    Immature Granulocytes %, Automated 0.4 0.0 - 0.9 %    Lymphocytes % 15.1 13.0 - 44.0 %    Monocytes % 10.6 2.0 - 10.0 %    Eosinophils % 1.1 0.0 - 6.0 %    Basophils % 0.3 0.0 - 2.0 %    Neutrophils Absolute 5.29 1.60 - 5.50 x10*3/uL    Immature Granulocytes Absolute, Automated 0.03 0.00 - 0.50 x10*3/uL    Lymphocytes Absolute 1.10 0.80 - 3.00 x10*3/uL    Monocytes Absolute 0.77 0.05 - 0.80 x10*3/uL    Eosinophils Absolute 0.08 0.00 - 0.40 x10*3/uL    Basophils Absolute 0.02 0.00 - 0.10 x10*3/uL   CBC and Auto Differential   Result Value Ref Range    WBC 6.1 4.4 - 11.3 x10*3/uL    nRBC 0.0 0.0 - 0.0 /100 WBCs    RBC 4.21 4.00 - 5.20 x10*6/uL    Hemoglobin 10.7 (L) 12.0 - 16.0 g/dL    Hematocrit 35.3 (L) 36.0 - 46.0 %    MCV 84 80 - 100 fL    MCH 25.4 (L) 26.0 - 34.0 pg    MCHC 30.3 (L) 32.0 - 36.0 g/dL    RDW 14.7 (H) 11.5 - 14.5 %    Platelets 246 150 - 450 x10*3/uL    Neutrophils % 60.5 40.0 - 80.0 %    Immature Granulocytes %, Automated 0.2 0.0 - 0.9 %    Lymphocytes % 23.1 13.0 - 44.0 %    Monocytes % 12.9 2.0 - 10.0 %    Eosinophils % 2.8 0.0 - 6.0 %    Basophils % 0.5 0.0 - 2.0 %    Neutrophils Absolute 3.66 1.60 - 5.50 x10*3/uL    Immature Granulocytes Absolute, Automated 0.01 0.00 - 0.50 x10*3/uL    Lymphocytes Absolute 1.40 0.80 - 3.00 x10*3/uL    Monocytes Absolute 0.78 0.05 - 0.80 x10*3/uL    Eosinophils Absolute 0.17 0.00 - 0.40 x10*3/uL    Basophils Absolute 0.03 0.00 - 0.10 x10*3/uL   Magnesium   Result Value Ref Range    Magnesium 2.23 1.60 - 2.40 mg/dL   Renal Function Panel   Result Value Ref Range    Glucose 99 74 - 99 mg/dL    Sodium 136 136 - 145 mmol/L    Potassium 3.8 3.5 - 5.3 mmol/L    Chloride  106 98 - 107 mmol/L    Bicarbonate 25 21 - 32 mmol/L    Anion Gap 9 (L) 10 - 20 mmol/L    Urea Nitrogen 13 6 - 23 mg/dL    Creatinine 1.08 (H) 0.50 - 1.05 mg/dL    eGFR 53 (L) >60 mL/min/1.73m*2    Calcium 8.5 (L) 8.6 - 10.6 mg/dL    Phosphorus 3.0 2.5 - 4.9 mg/dL    Albumin 3.2 (L) 3.4 - 5.0 g/dL     CT head wo IV contrast   Final Result   There is an area of encephalomalacia again noted along the medial   left occipital lobe.        There is again evidence of similar mild brain parenchymal volume loss.        Scattered nonspecific white matter changes are again noted within the   cerebral hemispheres bilaterally as well as vague hypodensity   overlying the brainstem which while nonspecific, given the patient's   age, likely represent sequelae of small-vessel ischemic change.   Additional punctate hypodensities are identified within the   subinsular regions and basal ganglia bilaterally suggesting   incidental mildly prominent perivascular spaces and/or small   scattered lacunar infarctions.        MACRO:   None.        Signed by: Randy Grey 11/15/2023 1:55 PM   Dictation workstation:   [id number]      Vascular US renal artery duplex complete   Final Result      Transthoracic Echo (TTE) Complete   Final Result      CT abdomen pelvis w IV contrast   Final Result   1.  Interval development of large wedge-shaped hypoenhancing areas in   the mid to inferior portions of the right kidney with no significant   associated fat stranding or urothelial thickening. Findings   concerning for right renal infarction. Pyelonephritis is a   possibility, for correlation with urinalysis. The right renal artery   and vein appear patent within the limits of the current exam.   2. Small hiatal hernia. Apparent gastric wall thickening which can be   secondary to nondistention versus mild gastritis.   3. Additional chronic findings are as described above.        I personally reviewed the images/study and I agree with the findings   as  stated by Resident Sebastien Xiong MD. This study was interpreted   at [facility], [place], [place].        MACRO:   None        Signed by: Valeri Greenfield 11/12/2023 6:15 PM   Dictation workstation:   [id number]      XR chest 2 views   Final Result   No acute cardiopulmonary process.   Signed by Lalo Garduno MD              Assessment/Plan   Principal Problem:    Renal infarction (CMS/HCC)    78-year-old female with past medical history of CVA (eliquis), GERD, gastritis, BPPV, COPD/chronic bronchitis(not on inhalers), paroxysmal AF, diverticulitis, breast cancer, HTN, HLD, and CHF (EF 45%) that presents today for Rt sided non-colicky flank pain.Vitals stable with tenderness in the RT CVA, Labs with no leucoytosis, hematuria, neg UA. CT AP with renal infarct and patent renal artery and veins. Pts Clinical presentation unlikely renal colic (as pt pain not colicky, has no hematuria,)Pyelonephritis unlikely as pt afebrile and with no leucocytosis. Renal infarction likely thrombo embolic iso hcx of paroxysmal AF and questionable compliance to eliquis.    11/17 Updates:  -Normal brown stools  -25 mg losartan at home    ##Renal infarct  #Pyelonephritis or Nephrolithiasis unlikely  #Hx of paroxysmal AF  #NASIM  -Fu Echo (TTE/ELA) in am, antiphospholid screen, Protein C and S, antithrombin to determine aetiology of infarct.  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   :: Not on any inhalers.   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 Major, on 6/15/2020\  pLAN:  -Can consider MRI of the Lumbar and thoracic region  -Hold Duloxetine 30mg daily  -c/w other pain meds, can consider mm relaxant if pain not improving      #Ductal carcinoma in situ (DCIS) of left breast   ::s/p left breast mastectomy on 5/22/19.     ::Follows with breast surgery  Stable        #Urothelial carcinoma of bladder    -S/p TURBT and chemotherapy  Plan:   -Follows with Urologist, Dr. Bella.          #Insomnia  #possible EVA   -trazodone 50 mg at bedtime     #Benign paroxysmal positional vertigo of right ear           Meclizine prn  Follows up with Physical Therapy        F : PRN  E: PRN  N: Adult regular  A: PIV   DVT Prophylaxis: Eliquis     Code Status: Full Code (confirmed on admission)   NOK:  Primary Emergency Contact: Kayley Penny, Home Phone: [phone]           Jose Jones MD  PGY-1  [place] Pager [phone]

## 2023-11-17 NOTE — NURSING NOTE
Patient discharged from unit.  Left via ambulance in WC.  Belongings with patient.  IV removed.  discharge paperwork discussed with patient. No further questions.

## 2023-11-17 NOTE — PROGRESS NOTES
Discharge Transfer to Facility - Direct precert team confirmed precert to SNF approved, facility confirmed they are able to admit today.  Patient will discharge today to: SNF  Facility name: [facility]   Facility phone number: [phone]   [place] aware.  Bedside nurse (name) aware to call report: Helena  Phone number for report: [phone]   Ambulance transport has been arranged.  Ambulance Company name: [facility] (wheelchair)   Ambulance Company phone number: [phone]   time: 2 p.m.  Patient aware of transport time and stated she will update sister Kayley.  Patient has been approved for discharge after 8pm: n/a  Medical team and facility updated on transport time. Unit secretary provided with blue transfer slip. Discharge orders sent to facility via [facility]. CNC completed 7000 in [facility]. Care coordinator will continue to follow for discharge planning needs.    Alka Asif RN  Transitional Care Coordinator/TCC  [id number]

## 2023-11-18 NOTE — DISCHARGE SUMMARY
Discharge Diagnosis  Renal infarction (CMS/HCC)    Issues Requiring Follow-Up  - titrate losartan (decreased dose in hospital from 100mg daily to 25mg daily)     Test Results Pending At Discharge  Pending Labs       No current pending labs.            Hospital Course  78-year-old female with past medical history of CVA (eliquis), GERD, gastritis, BPPV, COPD/chronic bronchitis(not on inhalers), paroxysmal AF, diverticulitis, breast cancer, HTN, HLD, and CHF (EF 45%) that presents today for Rt sided non-colicky flank pain.Vitals stable with tenderness in the RT CVA, Labs with no leucoytosis, hematuria, neg UA. CT AP with renal infarct and patent renal artery and veins. Pt likely had thromboembolic disease from lack of Eliquis compliance for past 4-5 days leading to embolus in kidney. Pt started on heparin gtt. Echocardiogram ordered for further workup and showed LVEF 45-50% and no LV thrombus and no interval changes from prior echo in 03/2022. On AM of 11/15, pt noted strong l sided headache that woke her up and noted to have dilated pupils. CTH ruled out hemorrhage or stroke and showed microvascular ischemic changes. Pt also continued to have NASIM with Feurea calculated at 39.6% suggesting intrinsic renal disease. Pt monitored for renal recovery and Cr continued to downtrend with adequate urine output. Pt was overall stable renally and discharged to SNF on Eliquis.      Home Medications     Medication List      START taking these medications     guaiFENesin 600 mg 12 hr tablet; Commonly known as: Mucinex; Take 1   tablet (600 mg) by mouth 2 times a day as needed for cough. Do not crush,   chew, or split.   polyethylene glycol 17 gram packet; Commonly known as: Glycolax,   Miralax; Take 17 g by mouth once daily.     CHANGE how you take these medications     losartan 25 mg tablet; Commonly known as: Cozaar; Take 1 tablet (25 mg)   by mouth once daily. Do not start before November 18, 2023.; What changed:   medication  strength, how much to take     CONTINUE taking these medications     amLODIPine 5 mg tablet; Commonly known as: Norvasc   aspirin 81 mg EC tablet   DULoxetine 30 mg DR capsule; Commonly known as: Cymbalta   Eliquis 5 mg tablet; Generic drug: apixaban   furosemide 20 mg tablet; Commonly known as: Lasix   meclizine 12.5 mg tablet; Commonly known as: Antivert; Take 1 tablet   (12.5 mg) by mouth 3 times a day as needed for dizziness.   metoprolol tartrate 25 mg tablet; Commonly known as: Lopressor   nitroglycerin 0.4 mg SL tablet; Commonly known as: Nitrostat   rosuvastatin 5 mg tablet; Commonly known as: Crestor   traZODone 50 mg tablet; Commonly known as: Desyrel; Take 1 tablet (50   mg) by mouth as needed at bedtime for sleep.       Outpatient Follow-Up  Future Appointments   Date Time Provider Department Center   1/8/2024  8:00 AM Randy Thomas MD [id number] [facility]   8/8/2024  [facility]   8/8/2024  9:00 AM Staci Buchanan, APRN-CNP [id number] [facility]       Onofre Whiting MD

## 2023-12-15 ENCOUNTER — TELEPHONE (OUTPATIENT)
Dept: PRIMARY CARE | Facility: CLINIC | Age: 78
End: 2023-12-15
Payer: MEDICARE

## 2023-12-15 NOTE — TELEPHONE ENCOUNTER
Home care calling about pt bp and pulse   144/101  149/103  P- 110  P-113  Home care wants to know if you want to adjust her meds

## 2023-12-26 ENCOUNTER — LAB (OUTPATIENT)
Dept: LAB | Facility: LAB | Age: 78
End: 2023-12-26
Payer: MEDICARE

## 2023-12-26 ENCOUNTER — OFFICE VISIT (OUTPATIENT)
Dept: PRIMARY CARE | Facility: CLINIC | Age: 78
End: 2023-12-26
Payer: MEDICARE

## 2023-12-26 VITALS
BODY MASS INDEX: 31.62 KG/M2 | DIASTOLIC BLOOD PRESSURE: 87 MMHG | WEIGHT: 190 LBS | HEART RATE: 84 BPM | SYSTOLIC BLOOD PRESSURE: 127 MMHG | OXYGEN SATURATION: 98 %

## 2023-12-26 DIAGNOSIS — R53.83 FATIGUE, UNSPECIFIED TYPE: ICD-10-CM

## 2023-12-26 DIAGNOSIS — Z09 HOSPITAL DISCHARGE FOLLOW-UP: Primary | ICD-10-CM

## 2023-12-26 DIAGNOSIS — G47.30 MILD SLEEP APNEA: ICD-10-CM

## 2023-12-26 DIAGNOSIS — E55.9 VITAMIN D DEFICIENCY: ICD-10-CM

## 2023-12-26 DIAGNOSIS — I10 ESSENTIAL HYPERTENSION: ICD-10-CM

## 2023-12-26 DIAGNOSIS — J31.0 RHINITIS MEDICAMENTOSA: ICD-10-CM

## 2023-12-26 DIAGNOSIS — D64.9 ANEMIA, UNSPECIFIED TYPE: ICD-10-CM

## 2023-12-26 DIAGNOSIS — R60.0 BILATERAL LEG EDEMA: ICD-10-CM

## 2023-12-26 DIAGNOSIS — T48.5X5A RHINITIS MEDICAMENTOSA: ICD-10-CM

## 2023-12-26 LAB
25(OH)D3 SERPL-MCNC: 25 NG/ML (ref 30–100)
ALBUMIN SERPL BCP-MCNC: 3.8 G/DL (ref 3.4–5)
ALP SERPL-CCNC: 75 U/L (ref 33–136)
ALT SERPL W P-5'-P-CCNC: 21 U/L (ref 7–45)
ANION GAP SERPL CALC-SCNC: 14 MMOL/L (ref 10–20)
AST SERPL W P-5'-P-CCNC: 17 U/L (ref 9–39)
BASOPHILS # BLD AUTO: 0.04 X10*3/UL (ref 0–0.1)
BASOPHILS NFR BLD AUTO: 0.7 %
BILIRUB SERPL-MCNC: 0.5 MG/DL (ref 0–1.2)
BUN SERPL-MCNC: 14 MG/DL (ref 6–23)
CALCIUM SERPL-MCNC: 9.5 MG/DL (ref 8.6–10.6)
CHLORIDE SERPL-SCNC: 106 MMOL/L (ref 98–107)
CO2 SERPL-SCNC: 28 MMOL/L (ref 21–32)
CREAT SERPL-MCNC: 0.9 MG/DL (ref 0.5–1.05)
EOSINOPHIL # BLD AUTO: 0.16 X10*3/UL (ref 0–0.4)
EOSINOPHIL NFR BLD AUTO: 2.9 %
ERYTHROCYTE [DISTWIDTH] IN BLOOD BY AUTOMATED COUNT: 14.8 % (ref 11.5–14.5)
GFR SERPL CREATININE-BSD FRML MDRD: 66 ML/MIN/1.73M*2
GLUCOSE SERPL-MCNC: 106 MG/DL (ref 74–99)
HCT VFR BLD AUTO: 38.4 % (ref 36–46)
HGB BLD-MCNC: 11.6 G/DL (ref 12–16)
IMM GRANULOCYTES # BLD AUTO: 0.02 X10*3/UL (ref 0–0.5)
IMM GRANULOCYTES NFR BLD AUTO: 0.4 % (ref 0–0.9)
LYMPHOCYTES # BLD AUTO: 1.57 X10*3/UL (ref 0.8–3)
LYMPHOCYTES NFR BLD AUTO: 28.9 %
MCH RBC QN AUTO: 25.4 PG (ref 26–34)
MCHC RBC AUTO-ENTMCNC: 30.2 G/DL (ref 32–36)
MCV RBC AUTO: 84 FL (ref 80–100)
MONOCYTES # BLD AUTO: 0.4 X10*3/UL (ref 0.05–0.8)
MONOCYTES NFR BLD AUTO: 7.4 %
NEUTROPHILS # BLD AUTO: 3.24 X10*3/UL (ref 1.6–5.5)
NEUTROPHILS NFR BLD AUTO: 59.7 %
NRBC BLD-RTO: 0 /100 WBCS (ref 0–0)
PLATELET # BLD AUTO: 316 X10*3/UL (ref 150–450)
POTASSIUM SERPL-SCNC: 4.1 MMOL/L (ref 3.5–5.3)
PROT SERPL-MCNC: 6.3 G/DL (ref 6.4–8.2)
RBC # BLD AUTO: 4.57 X10*6/UL (ref 4–5.2)
SODIUM SERPL-SCNC: 144 MMOL/L (ref 136–145)
TSH SERPL-ACNC: 1.07 MIU/L (ref 0.44–3.98)
VIT B12 SERPL-MCNC: 939 PG/ML (ref 211–911)
WBC # BLD AUTO: 5.4 X10*3/UL (ref 4.4–11.3)

## 2023-12-26 PROCEDURE — 36415 COLL VENOUS BLD VENIPUNCTURE: CPT

## 2023-12-26 PROCEDURE — 85025 COMPLETE CBC W/AUTO DIFF WBC: CPT

## 2023-12-26 PROCEDURE — 82607 VITAMIN B-12: CPT

## 2023-12-26 PROCEDURE — G0008 ADMIN INFLUENZA VIRUS VAC: HCPCS | Performed by: PHYSICIAN ASSISTANT

## 2023-12-26 PROCEDURE — 80053 COMPREHEN METABOLIC PANEL: CPT

## 2023-12-26 PROCEDURE — 83540 ASSAY OF IRON: CPT

## 2023-12-26 PROCEDURE — 90662 IIV NO PRSV INCREASED AG IM: CPT | Performed by: PHYSICIAN ASSISTANT

## 2023-12-26 PROCEDURE — 83550 IRON BINDING TEST: CPT

## 2023-12-26 PROCEDURE — 82306 VITAMIN D 25 HYDROXY: CPT

## 2023-12-26 PROCEDURE — 99214 OFFICE O/P EST MOD 30 MIN: CPT | Performed by: PHYSICIAN ASSISTANT

## 2023-12-26 PROCEDURE — 1036F TOBACCO NON-USER: CPT | Performed by: PHYSICIAN ASSISTANT

## 2023-12-26 PROCEDURE — 3074F SYST BP LT 130 MM HG: CPT | Performed by: PHYSICIAN ASSISTANT

## 2023-12-26 PROCEDURE — 1126F AMNT PAIN NOTED NONE PRSNT: CPT | Performed by: PHYSICIAN ASSISTANT

## 2023-12-26 PROCEDURE — 82728 ASSAY OF FERRITIN: CPT

## 2023-12-26 PROCEDURE — 1159F MED LIST DOCD IN RCRD: CPT | Performed by: PHYSICIAN ASSISTANT

## 2023-12-26 PROCEDURE — 3079F DIAST BP 80-89 MM HG: CPT | Performed by: PHYSICIAN ASSISTANT

## 2023-12-26 PROCEDURE — 84443 ASSAY THYROID STIM HORMONE: CPT

## 2023-12-26 RX ORDER — FLUTICASONE PROPIONATE 50 MCG
1 SPRAY, SUSPENSION (ML) NASAL DAILY
Qty: 16 G | Refills: 1 | Status: SHIPPED | OUTPATIENT
Start: 2023-12-26 | End: 2024-03-28 | Stop reason: ALTCHOICE

## 2023-12-26 NOTE — PROGRESS NOTES
"Subjective   Jazzy Schaeffer is a 78 y.o. female who presents for follow up from er visit . Generally doing well. Currently c/o:    Fatigue: takes trazodone, but not daily. Endorses dry mouth upon waking, waking up during the middle of the night feeling like she has sand in her mouth, snoring,     She was recently admitted to [facility] from 11/12/23 - 11/17/23 for a renal infarction. Recall her course, \"non-colicky flank pain.Vitals stable with tenderness in the RT CVA, Labs with no leucoytosis, hematuria, neg UA. CT AP with renal infarct and patent renal artery and veins. Pt likely had thromboembolic disease from lack of Eliquis compliance for past 4-5 days leading to embolus in kidney. Pt started on heparin gtt. Echocardiogram ordered for further workup and showed LVEF 45-50% and no LV thrombus and no interval changes from prior echo in 03/2022. On AM of 11/15, pt noted strong l sided headache that woke her up and noted to have dilated pupils. CTH ruled out hemorrhage or stroke and showed microvascular ischemic changes. Pt also continued to have NASIM with Feurea calculated at 39.6% suggesting intrinsic renal disease. Pt monitored for renal recovery and Cr continued to downtrend with adequate urine output. Pt was overall stable renally and discharged to SNF on Eliquis.\" She was admitted to [facility] for 1 week. She is now followed by home care.     Since discharge from SNF, she has been feeling poorly. States she given duloxetine in the hospital (due to a med list error) and she developed tachycardia and elevated BP. She has since discounted the duloxetine, BP/HR have returned to normal. Losartan was also decreased from 100mg to 25mg d/t NASIM. She endorses having BLE edema since the decrease in losartan. She continues to take furosemide 20mg every other day. She is checking BP at home, running controlled. Endorses MARION and orthopnea.     She also endorses having diaphoresis, sinus pressure, eye pressure, " post nasal drip, rhinorrhea, cough, brown sputum. She has been taking mucinex and using Vicks Sinex spray (oxymetazoline). She tested for COVID 3x and was negative.     12 point ROS reviewed and negative other than as stated in HPI    /87   Pulse 84   Wt 86.2 kg (190 lb)   SpO2 98%   BMI 31.62 kg/m²   Objective   Physical Exam  Vitals reviewed.   Constitutional:       General: She is not in acute distress.     Appearance: Normal appearance. She is not ill-appearing.   HENT:      Head: Normocephalic and atraumatic.   Eyes:      General: No scleral icterus.     Extraocular Movements: Extraocular movements intact.      Conjunctiva/sclera: Conjunctivae normal.      Pupils: Pupils are equal, round, and reactive to light.   Cardiovascular:      Rate and Rhythm: Normal rate and regular rhythm.      Heart sounds: Normal heart sounds. No murmur heard.     No friction rub. No gallop.   Pulmonary:      Effort: Pulmonary effort is normal. No respiratory distress.      Breath sounds: Normal breath sounds. No stridor. No wheezing, rhonchi or rales.   Musculoskeletal:      Cervical back: Normal range of motion.      Right lower leg: No edema.      Left lower leg: No edema.   Skin:     General: Skin is warm and dry.   Neurological:      Mental Status: She is alert and oriented to person, place, and time. Mental status is at baseline.      Cranial Nerves: No cranial nerve deficit.      Gait: Gait normal.   Psychiatric:         Mood and Affect: Mood normal.         Behavior: Behavior normal.         Assessment/Plan   Problem List Items Addressed This Visit       Fatigue     Referral to sleep medicine placed. Labs ordered. Encouraged appropriate sleep hygiene.          Relevant Orders    Comprehensive metabolic panel (Completed)    CBC and Auto Differential (Completed)    Vitamin B12 (Completed)    Vitamin D 25-Hydroxy,Total (for eval of Vitamin D levels) (Completed)    Tsh With Reflex To Free T4 If Abnormal (Completed)     Essential hypertension      BP is controlled. Will hold losartan at 25mg for the time being. Continue amlodipine 5mg and metoprolol tartrate 25mg. Follow a DASH diet. Follow with cardiology         Mild sleep apnea     Referral to sleep medicine placed. Avoid sleeping supine, alcohol use, caffeine use         Relevant Orders    Referral to Adult Sleep Medicine    Vitamin D deficiency     Vitamin D level ordered         Relevant Orders    Vitamin D 25-Hydroxy,Total (for eval of Vitamin D levels) (Completed)    Hospital discharge follow-up - Primary    Rhinitis medicamentosa     Stop using oxymetazoline nasal spray. Use flonase nasal spray and saline nasal spray only         Relevant Medications    fluticasone (Flonase) 50 mcg/actuation nasal spray    Bilateral leg edema     Take furosemide daily x 5 days, then return to every other day. Wear compression stockings and elevate BLE             Follow up in 2-3 months or sooner as needed

## 2023-12-27 DIAGNOSIS — D64.9 ANEMIA, UNSPECIFIED TYPE: Primary | ICD-10-CM

## 2023-12-27 LAB
FERRITIN SERPL-MCNC: 112 NG/ML (ref 8–150)
IRON SATN MFR SERPL: 16 % (ref 25–45)
IRON SERPL-MCNC: 44 UG/DL (ref 35–150)
TIBC SERPL-MCNC: 278 UG/DL (ref 240–445)
UIBC SERPL-MCNC: 234 UG/DL (ref 110–370)

## 2023-12-27 PROCEDURE — G0180 MD CERTIFICATION HHA PATIENT: HCPCS | Performed by: PHYSICIAN ASSISTANT

## 2023-12-28 NOTE — RESULT ENCOUNTER NOTE
Kidney function improved slightly from last draw. Continue drinking adequate water, avoiding medications like NSAIDs (ibuprofen and aleve), and monitoring BP closely.     Vitamin D level was low. Take an over the counter vitamin D3 2000unit supplement daily with food.     Blood counts were slightly low, but improved from last draw. Increase intake of iron rich foods in the diet (spinach, kale, beans, sweet potatoes, etc.)     Remainder of labs look stable

## 2024-01-02 ENCOUNTER — TELEPHONE (OUTPATIENT)
Dept: CARDIOLOGY | Facility: CLINIC | Age: 79
End: 2024-01-02
Payer: MEDICARE

## 2024-01-03 PROBLEM — E55.9 VITAMIN D DEFICIENCY: Status: ACTIVE | Noted: 2024-01-03

## 2024-01-03 PROBLEM — R60.0 BILATERAL LEG EDEMA: Status: ACTIVE | Noted: 2024-01-03

## 2024-01-03 PROBLEM — G47.30 MILD SLEEP APNEA: Status: ACTIVE | Noted: 2024-01-03

## 2024-01-03 PROBLEM — Z09 HOSPITAL DISCHARGE FOLLOW-UP: Status: ACTIVE | Noted: 2024-01-03

## 2024-01-03 PROBLEM — J31.0 RHINITIS MEDICAMENTOSA: Status: ACTIVE | Noted: 2024-01-03

## 2024-01-03 PROBLEM — T48.5X5A RHINITIS MEDICAMENTOSA: Status: ACTIVE | Noted: 2024-01-03

## 2024-01-04 NOTE — ASSESSMENT & PLAN NOTE
Take furosemide daily x 5 days, then return to every other day. Wear compression stockings and elevate BLE

## 2024-01-04 NOTE — ASSESSMENT & PLAN NOTE
BP is controlled. Will hold losartan at 25mg for the time being. Continue amlodipine 5mg and metoprolol tartrate 25mg. Follow a DASH diet. Follow with cardiology

## 2024-01-08 ENCOUNTER — HOSPITAL ENCOUNTER (OUTPATIENT)
Dept: CARDIOLOGY | Facility: CLINIC | Age: 79
Discharge: HOME | End: 2024-01-08
Payer: MEDICARE

## 2024-01-08 ENCOUNTER — OFFICE VISIT (OUTPATIENT)
Dept: CARDIOLOGY | Facility: CLINIC | Age: 79
End: 2024-01-08
Payer: MEDICARE

## 2024-01-08 VITALS
HEIGHT: 65 IN | WEIGHT: 191 LBS | SYSTOLIC BLOOD PRESSURE: 135 MMHG | DIASTOLIC BLOOD PRESSURE: 85 MMHG | HEART RATE: 104 BPM | BODY MASS INDEX: 31.82 KG/M2

## 2024-01-08 DIAGNOSIS — I25.10 ATHEROSCLEROSIS OF NATIVE CORONARY ARTERY, UNSPECIFIED WHETHER ANGINA PRESENT, UNSPECIFIED WHETHER NATIVE OR TRANSPLANTED HEART: Primary | ICD-10-CM

## 2024-01-08 DIAGNOSIS — I48.0 PAROXYSMAL ATRIAL FIBRILLATION (MULTI): ICD-10-CM

## 2024-01-08 DIAGNOSIS — I10 ESSENTIAL HYPERTENSION: ICD-10-CM

## 2024-01-08 PROCEDURE — 1160F RVW MEDS BY RX/DR IN RCRD: CPT | Performed by: INTERNAL MEDICINE

## 2024-01-08 PROCEDURE — 99214 OFFICE O/P EST MOD 30 MIN: CPT | Performed by: INTERNAL MEDICINE

## 2024-01-08 PROCEDURE — 1126F AMNT PAIN NOTED NONE PRSNT: CPT | Performed by: INTERNAL MEDICINE

## 2024-01-08 PROCEDURE — 1036F TOBACCO NON-USER: CPT | Performed by: INTERNAL MEDICINE

## 2024-01-08 PROCEDURE — 93005 ELECTROCARDIOGRAM TRACING: CPT

## 2024-01-08 PROCEDURE — 3075F SYST BP GE 130 - 139MM HG: CPT | Performed by: INTERNAL MEDICINE

## 2024-01-08 PROCEDURE — 3079F DIAST BP 80-89 MM HG: CPT | Performed by: INTERNAL MEDICINE

## 2024-01-08 PROCEDURE — 1159F MED LIST DOCD IN RCRD: CPT | Performed by: INTERNAL MEDICINE

## 2024-01-08 PROCEDURE — 93010 ELECTROCARDIOGRAM REPORT: CPT | Performed by: INTERNAL MEDICINE

## 2024-01-08 RX ORDER — FUROSEMIDE 20 MG/1
20 TABLET ORAL DAILY
Qty: 90 TABLET | Refills: 3 | Status: SHIPPED | OUTPATIENT
Start: 2024-01-08

## 2024-01-08 RX ORDER — METOPROLOL TARTRATE 25 MG/1
TABLET, FILM COATED ORAL
Qty: 270 TABLET | Refills: 3 | Status: SHIPPED | OUTPATIENT
Start: 2024-01-08 | End: 2024-01-22 | Stop reason: SDUPTHER

## 2024-01-08 ASSESSMENT — ENCOUNTER SYMPTOMS
LOSS OF SENSATION IN FEET: 0
DEPRESSION: 0
OCCASIONAL FEELINGS OF UNSTEADINESS: 0

## 2024-01-08 ASSESSMENT — PATIENT HEALTH QUESTIONNAIRE - PHQ9
SUM OF ALL RESPONSES TO PHQ9 QUESTIONS 1 AND 2: 0
1. LITTLE INTEREST OR PLEASURE IN DOING THINGS: NOT AT ALL
2. FEELING DOWN, DEPRESSED OR HOPELESS: NOT AT ALL

## 2024-01-08 ASSESSMENT — PAIN SCALES - GENERAL: PAINLEVEL: 0-NO PAIN

## 2024-01-08 NOTE — PROGRESS NOTES
Subjective:  Patient returns for follow-up.  She unfortunately was hospitalized around Thanksgiving time with an apparent UTI.  She did have her medications adjusted a bit as well.  She comes in today for follow-up.  She apparently recuperated reasonably well but did require rehab for a bit.  She has been noticing a bit of shortness of breath recently.  She has some intermittent occasional chest discomfort but no severe or prolonged episodes of pain.  She denies any recurrent stroke symptomatology.  She denies any bleeding problems despite good compliance with her Eliquis.  She does need to get into the [facility] clinic to see if we can get this at a more affordable price for her.  She is frustrated with her activity intolerance.  She denies any presyncope or syncope.  She did have her blood pressure medicines adjusted while in the hospital and says she has been getting some elevated readings at home.    Objective:  General: Alert, usual pleasant self.  HEENT: Unchanged.  Lungs: Diminished air exchange with expiratory prolongation but no priti crackles or wheezing.  Cardiac: Normal S1 and S2 without change.  Abdomen: Nontender.  Extremities: Mild ankle edema.  Skin: No acute rash.  Neuro: Grossly unchanged.    EKG: Atrial fibrillation with rapid ventricular response rate.  Nonspecific T wave abnormality.    Lipid Panel:  Chol-135, HDL-55, LDL-62, TG-80    Impression/plan:  Jazzy is struggling a bit at this time.  She does appear to have finally gone into documented atrial fibrillation at this time.  Her blood pressure today is only marginally elevated.  I will keep her anticoagulated and will get her into the [facility] clinic to see if we can keep her on the Eliquis at a more affordable price.  I will increase her metoprolol to 50 in the morning and 20 5 at night to try to get her heart rate under better control.  I will see her back in 2 weeks and will sort out if we need to potentially consider cardioversion or further  escalation of her rate control/antihypertensive regimen.  We may also need to sort out if we need to obtain a monitor.  I was delighted with her lipid panel from earlier this year.  She is not having any clearly problematic anginal type symptoms so I did not think we needed to jump and do a catheterization at this time.  She knows to call for any intercurrent concerns before her next visit.    Patient instructions:    Increase metoprolol as directed.    Continue other medications unchanged.    Return to clinic in 2 weeks.

## 2024-01-09 LAB
ATRIAL RATE: 141 BPM
Q ONSET: 220 MS
QRS COUNT: 17 BEATS
QRS DURATION: 88 MS
QT INTERVAL: 366 MS
QTC CALCULATION(BAZETT): 481 MS
QTC FREDERICIA: 439 MS
R AXIS: 58 DEGREES
T AXIS: 54 DEGREES
T OFFSET: 403 MS
VENTRICULAR RATE: 104 BPM

## 2024-01-10 DIAGNOSIS — I50.9 ACUTE ON CHRONIC CONGESTIVE HEART FAILURE, UNSPECIFIED HEART FAILURE TYPE (MULTI): Primary | ICD-10-CM

## 2024-01-10 RX ORDER — LOSARTAN POTASSIUM 25 MG/1
25 TABLET ORAL DAILY
Qty: 90 TABLET | Refills: 3 | Status: SHIPPED | OUTPATIENT
Start: 2024-01-10 | End: 2024-01-24 | Stop reason: WASHOUT

## 2024-01-11 ENCOUNTER — TELEMEDICINE (OUTPATIENT)
Dept: PHARMACY | Facility: HOSPITAL | Age: 79
End: 2024-01-11
Payer: MEDICARE

## 2024-01-11 DIAGNOSIS — I48.0 PAROXYSMAL ATRIAL FIBRILLATION (MULTI): ICD-10-CM

## 2024-01-11 NOTE — Clinical Note
Hi Dr. Thomas. We are going to try to get Jazzy set up for  PAP for her Eliquis. Will continue to follow.

## 2024-01-11 NOTE — PROGRESS NOTES
"Pharmacist Clinic: Anticoagulation Management  Jazzy Schaeffer was referred to the Clinical Pharmacy Team for her anticoagulation management.    Referring Provider:  Randy Thomas  _______________________________________________________________________  PHARMACY ASSESSMENT    Allergies Reviewed? Yes  Home Pharmacy Reviewed? Yes, describe: [facility]    Affordability/Accessibility: Patient reported she used to be able to afford Eliquis at the beginning of 2023 at $88 for 3 month supply. In 10/2023, she hit the coverage gap (donut rosa m), copay ~$490 for 3 month supply. Patient has been receiving samples at provider's office, still has some supply at home  Adherence/Organization: Adherent  Adverse Effects: No bruising, significant bleeding, fall, trauma    MEDICATION RECONCILIATION  Added:  - None  Changed:  - None  Removed:  - None    RELEVANT LAB RESULTS  Lab Results   Component Value Date    BILITOT 0.5 12/26/2023    CALCIUM 9.5 12/26/2023    CO2 28 12/26/2023     12/26/2023    CREATININE 0.90 12/26/2023    GLUCOSE 106 (H) 12/26/2023    ALKPHOS 75 12/26/2023    K 4.1 12/26/2023    PROT 6.3 (L) 12/26/2023     12/26/2023    AST 17 12/26/2023    ALT 21 12/26/2023    BUN 14 12/26/2023    ANIONGAP 14 12/26/2023    MG 2.23 11/17/2023    PHOS 3.0 11/17/2023     (H) 11/13/2023    ALBUMIN 3.8 12/26/2023    AMYLASE 40 09/26/2019    LIPASE 10 11/12/2023    GFRF 90 01/12/2023     Lab Results   Component Value Date    TRIG 80 01/12/2023    CHOL 133 01/12/2023    HDL 55.1 01/12/2023     No results found for: \"BMCBC\", \"CBCDIF\"       DRUG INTERACTIONS  - Yes, describe: Aspirin and apixaban might increase risk of bleeding  _______________________________________________________________________  ANTICOAGULATION ASSESSMENT    The ASCVD Risk score (Westley JONES, et al., 2019) failed to calculate for the following reasons:    The patient has a prior MI or stroke diagnosis    DIAGNOSIS: treatment " of 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  - Patient is projected to be on anticoagulation indefinitely  - [id number] Score: [8]   Age: [<65 (0)] [65-74 (+1)] [> 75 (+2)]: 2  Sex: [Male/Female (+1)]: 1  CHF history: [No/Yes(+1)]: 1  Hypertension history: [No/Yes(+1)]: 1  Stroke/TIA/thromboembolism history: [No/Yes(+2)]: 2  Vascular disease history (prior MI, peripheral artery disease, aortic plaque): [No/Yes(+1)]: 1  Diabetes history: [No/Yes(+1)]: 0    CURRENT PHARMACOTHERAPY:   - Eliquis 5 mg PO BID  - 78 years old  - Scr 0.9 mg/dl   - 86.6 kg      REVIEW OF PHARMACOTHERAPY/MEDICAL HISTORY  - Patient has a significant history including stroke in 2023, AFIB, HTN, HLD, CHF, peripheral vascular disease.  No issues with Eliquis since initiation.     PERTINANT MEDICAL HISTORY:  - Medical history: history of stroke, AFIB, CVA, HTN, HLD, CHF (EF 45%), peripheral vascular disease   - Medication history: Patient was not on anticoagulation when first diagnosed with Afib, currently taking Eliquis     _______________________________________________________________________  PATIENT EDUCATION/GOALS  - Counseled patient on MOA, expectations, duration of therapy, contraindications, administration, and monitoring parameters  - Counseled patient of side effects that are indicative of bleeding such as dark tarry stool, unexplainable bruising, or vomiting up a coffee ground like substance  - Answered all patient questions and concerns  _______________________________________________________________________  RECOMMENDATIONS/PLAN  [facility] Patient Assistance Program (PAP)    Patient verbally reports monthly or yearly income which is less than 400% federal poverty level    Application for program to be submitted for the following medications: Eliquis    Prescription Insurance: Yes  Members of Household: 1  Files Taxes: Yes    Patient will be email financial information to pharmacist directly at  [email or web address].    Patient aware this process may take up to 6 weeks.     If approved medication must be filled through [facility] pharmacy and mailed to patient.       Next Cardiology Appointment: 1/22/2024  Clinical Pharmacist follow up: 4/11/2024   Type of Encounter: Arpit Cates, Timoteo    Verbal consent to manage patient's drug therapy was obtained from the patient . They were informed they may decline to participate or withdraw from participation in pharmacy services at any time.    Continue all meds under the continuation of care with the referring provider and clinical pharmacy team.

## 2024-01-22 ENCOUNTER — HOSPITAL ENCOUNTER (OUTPATIENT)
Dept: CARDIOLOGY | Facility: CLINIC | Age: 79
Discharge: HOME | End: 2024-01-22
Payer: MEDICARE

## 2024-01-22 ENCOUNTER — OFFICE VISIT (OUTPATIENT)
Dept: CARDIOLOGY | Facility: CLINIC | Age: 79
End: 2024-01-22
Payer: MEDICARE

## 2024-01-22 VITALS
HEIGHT: 65 IN | WEIGHT: 190.4 LBS | BODY MASS INDEX: 31.72 KG/M2 | HEART RATE: 106 BPM | OXYGEN SATURATION: 98 % | DIASTOLIC BLOOD PRESSURE: 85 MMHG | SYSTOLIC BLOOD PRESSURE: 135 MMHG

## 2024-01-22 DIAGNOSIS — I48.91 ATRIAL FIBRILLATION, UNSPECIFIED TYPE (MULTI): Primary | ICD-10-CM

## 2024-01-22 DIAGNOSIS — R07.9 CHEST PAIN, UNSPECIFIED TYPE: ICD-10-CM

## 2024-01-22 DIAGNOSIS — I50.9 ACUTE ON CHRONIC CONGESTIVE HEART FAILURE, UNSPECIFIED HEART FAILURE TYPE (MULTI): ICD-10-CM

## 2024-01-22 DIAGNOSIS — I25.10 ATHEROSCLEROSIS OF NATIVE CORONARY ARTERY, UNSPECIFIED WHETHER ANGINA PRESENT, UNSPECIFIED WHETHER NATIVE OR TRANSPLANTED HEART: ICD-10-CM

## 2024-01-22 LAB
ATRIAL RATE: 375 BPM
Q ONSET: 221 MS
QRS COUNT: 17 BEATS
QRS DURATION: 86 MS
QT INTERVAL: 356 MS
QTC CALCULATION(BAZETT): 472 MS
QTC FREDERICIA: 430 MS
R AXIS: 19 DEGREES
T AXIS: -83 DEGREES
T OFFSET: 399 MS
VENTRICULAR RATE: 106 BPM

## 2024-01-22 PROCEDURE — 99214 OFFICE O/P EST MOD 30 MIN: CPT | Performed by: INTERNAL MEDICINE

## 2024-01-22 PROCEDURE — 1160F RVW MEDS BY RX/DR IN RCRD: CPT | Performed by: INTERNAL MEDICINE

## 2024-01-22 PROCEDURE — 93005 ELECTROCARDIOGRAM TRACING: CPT

## 2024-01-22 PROCEDURE — 3079F DIAST BP 80-89 MM HG: CPT | Performed by: INTERNAL MEDICINE

## 2024-01-22 PROCEDURE — 1126F AMNT PAIN NOTED NONE PRSNT: CPT | Performed by: INTERNAL MEDICINE

## 2024-01-22 PROCEDURE — 93010 ELECTROCARDIOGRAM REPORT: CPT | Performed by: INTERNAL MEDICINE

## 2024-01-22 PROCEDURE — 1036F TOBACCO NON-USER: CPT | Performed by: INTERNAL MEDICINE

## 2024-01-22 PROCEDURE — 1159F MED LIST DOCD IN RCRD: CPT | Performed by: INTERNAL MEDICINE

## 2024-01-22 PROCEDURE — 3075F SYST BP GE 130 - 139MM HG: CPT | Performed by: INTERNAL MEDICINE

## 2024-01-22 RX ORDER — LOSARTAN POTASSIUM 25 MG/1
25 TABLET ORAL DAILY
COMMUNITY
End: 2024-05-13 | Stop reason: SDUPTHER

## 2024-01-22 RX ORDER — NITROGLYCERIN 0.4 MG/1
0.4 TABLET SUBLINGUAL EVERY 5 MIN PRN
Qty: 25 TABLET | Refills: 3 | Status: SHIPPED | OUTPATIENT
Start: 2024-01-22

## 2024-01-22 RX ORDER — METOPROLOL TARTRATE 50 MG/1
50 TABLET ORAL 2 TIMES DAILY
Qty: 180 TABLET | Refills: 3 | Status: SHIPPED | OUTPATIENT
Start: 2024-01-22 | End: 2024-04-05 | Stop reason: HOSPADM

## 2024-01-22 ASSESSMENT — PATIENT HEALTH QUESTIONNAIRE - PHQ9
1. LITTLE INTEREST OR PLEASURE IN DOING THINGS: NOT AT ALL
SUM OF ALL RESPONSES TO PHQ9 QUESTIONS 1 AND 2: 0
2. FEELING DOWN, DEPRESSED OR HOPELESS: NOT AT ALL

## 2024-01-22 ASSESSMENT — ENCOUNTER SYMPTOMS
OCCASIONAL FEELINGS OF UNSTEADINESS: 0
DEPRESSION: 0
LOSS OF SENSATION IN FEET: 0

## 2024-01-22 ASSESSMENT — PAIN SCALES - GENERAL: PAINLEVEL: 0-NO PAIN

## 2024-01-22 NOTE — PROGRESS NOTES
Subjective:  Jazzy returns for a 2-week follow-up.  She unfortunately is still struggling with some fatigue and dyspnea.  Her blood pressures have also been elevated, and she continues to sense palpitations.  This is despite an escalated metoprolol dose for her atrial fibrillation control.  She remains compliant with her Eliquis without any bleeding problems.  She remains frustrated by her clinical deterioration from her atrial fibrillation.    Objective:  General: Alert, usual pleasant self.  HEENT: Unchanged.  Lungs: Mildly diminished air exchange with expiratory prolongation but no crackles or wheezing.  Cardiac: Unchanged.  Abdomen: Nontender.  Extremities: No edema.  Skin: No rash.  Neuro: Grossly intact and unchanged.    EKG: Atrial fibrillation with rapid ventricular response.    Impression/plan:  Patient unfortunately remains symptomatic with her atrial fibrillation.  She remains appropriately anticoagulated without any bleeding problems.  Her rate control is still somewhat suboptimal, so I will increase her metoprolol dosing to 50 mg twice a day.  Her blood pressure is still a bit elevated as well so I will plan on escalating her losartan dosing to 50 mg daily.  Given her clinical deterioration, I will plan on setting up for cardioversion in 2 days to try to restore sinus rhythm and see if this translates into significant clinical improvement.  I will make follow-up arrangements after we hopefully get her successfully cardioverted in 2 days.  She knows to call for any intercurrent concerns before then.  We took the liberty of renewing some of her medications for her.    Patient instructions:    Increase your metoprolol and losartan as directed.    Continue other medications unchanged.    Report for your cardioversion in 2 days and we will make follow-up arrangements after that.

## 2024-01-23 ENCOUNTER — TELEPHONE (OUTPATIENT)
Dept: CARDIOLOGY | Facility: HOSPITAL | Age: 79
End: 2024-01-23

## 2024-01-24 ENCOUNTER — HOSPITAL ENCOUNTER (OUTPATIENT)
Dept: CARDIOLOGY | Facility: HOSPITAL | Age: 79
Discharge: HOME | End: 2024-01-24
Payer: MEDICARE

## 2024-01-24 ENCOUNTER — ANESTHESIA (OUTPATIENT)
Dept: CARDIOLOGY | Facility: HOSPITAL | Age: 79
End: 2024-01-24
Payer: MEDICARE

## 2024-01-24 ENCOUNTER — ANESTHESIA EVENT (OUTPATIENT)
Dept: CARDIOLOGY | Facility: HOSPITAL | Age: 79
End: 2024-01-24
Payer: MEDICARE

## 2024-01-24 VITALS
WEIGHT: 184 LBS | DIASTOLIC BLOOD PRESSURE: 76 MMHG | OXYGEN SATURATION: 100 % | TEMPERATURE: 96.8 F | SYSTOLIC BLOOD PRESSURE: 154 MMHG | RESPIRATION RATE: 16 BRPM | HEIGHT: 65 IN | HEART RATE: 72 BPM | BODY MASS INDEX: 30.66 KG/M2

## 2024-01-24 DIAGNOSIS — I48.91 A-FIB (MULTI): ICD-10-CM

## 2024-01-24 DIAGNOSIS — I48.91 ATRIAL FIBRILLATION STATUS POST CARDIOVERSION (MULTI): Primary | ICD-10-CM

## 2024-01-24 PROCEDURE — 2500000004 HC RX 250 GENERAL PHARMACY W/ HCPCS (ALT 636 FOR OP/ED): Performed by: NURSE ANESTHETIST, CERTIFIED REGISTERED

## 2024-01-24 PROCEDURE — 92960 CARDIOVERSION ELECTRIC EXT: CPT | Performed by: INTERNAL MEDICINE

## 2024-01-24 PROCEDURE — 2500000004 HC RX 250 GENERAL PHARMACY W/ HCPCS (ALT 636 FOR OP/ED): Performed by: NURSE PRACTITIONER

## 2024-01-24 PROCEDURE — A92960 PR CARDIOVERSION, ELECTIVE;EXTERN: Performed by: NURSE ANESTHETIST, CERTIFIED REGISTERED

## 2024-01-24 PROCEDURE — 99100 ANES PT EXTEME AGE<1 YR&>70: CPT | Performed by: ANESTHESIOLOGY

## 2024-01-24 PROCEDURE — 3700000001 HC GENERAL ANESTHESIA TIME - INITIAL BASE CHARGE: Performed by: ANESTHESIOLOGY

## 2024-01-24 PROCEDURE — A92960 PR CARDIOVERSION, ELECTIVE;EXTERN: Performed by: ANESTHESIOLOGY

## 2024-01-24 PROCEDURE — 3700000002 HC GENERAL ANESTHESIA TIME - EACH INCREMENTAL 1 MINUTE: Performed by: ANESTHESIOLOGY

## 2024-01-24 PROCEDURE — 92960 CARDIOVERSION ELECTRIC EXT: CPT

## 2024-01-24 PROCEDURE — 93010 ELECTROCARDIOGRAM REPORT: CPT | Performed by: STUDENT IN AN ORGANIZED HEALTH CARE EDUCATION/TRAINING PROGRAM

## 2024-01-24 RX ORDER — PHENYLEPHRINE HCL IN 0.9% NACL 1 MG/10 ML
SYRINGE (ML) INTRAVENOUS AS NEEDED
Status: DISCONTINUED | OUTPATIENT
Start: 2024-01-24 | End: 2024-01-24

## 2024-01-24 RX ORDER — PROPOFOL 10 MG/ML
INJECTION, EMULSION INTRAVENOUS AS NEEDED
Status: DISCONTINUED | OUTPATIENT
Start: 2024-01-24 | End: 2024-01-24

## 2024-01-24 RX ORDER — APIXABAN 5 MG/1
5 TABLET, FILM COATED ORAL EVERY 12 HOURS
COMMUNITY
Start: 2024-01-24 | End: 2024-04-17 | Stop reason: SDUPTHER

## 2024-01-24 RX ORDER — SODIUM CHLORIDE 9 MG/ML
100 INJECTION, SOLUTION INTRAVENOUS CONTINUOUS
Status: DISCONTINUED | OUTPATIENT
Start: 2024-01-24 | End: 2024-01-25 | Stop reason: HOSPADM

## 2024-01-24 RX ADMIN — PROPOFOL 50 MG: 10 INJECTION, EMULSION INTRAVENOUS at 08:09

## 2024-01-24 RX ADMIN — PHENYLEPHRINE-NACL PREF SYR 1 MG/10ML-0.9% (100 MCG/ML) 100 MCG: 1-0.9/1 SOLUTION PREFILLED SYRINGE at 08:09

## 2024-01-24 RX ADMIN — SODIUM CHLORIDE 100 ML/HR: 9 INJECTION, SOLUTION INTRAVENOUS at 08:01

## 2024-01-24 SDOH — HEALTH STABILITY: MENTAL HEALTH: CURRENT SMOKER: 0

## 2024-01-24 ASSESSMENT — COLUMBIA-SUICIDE SEVERITY RATING SCALE - C-SSRS
1. IN THE PAST MONTH, HAVE YOU WISHED YOU WERE DEAD OR WISHED YOU COULD GO TO SLEEP AND NOT WAKE UP?: NO
6. HAVE YOU EVER DONE ANYTHING, STARTED TO DO ANYTHING, OR PREPARED TO DO ANYTHING TO END YOUR LIFE?: NO
2. HAVE YOU ACTUALLY HAD ANY THOUGHTS OF KILLING YOURSELF?: NO

## 2024-01-24 NOTE — ANESTHESIA POSTPROCEDURE EVALUATION
Patient: Jazzy Schaeffer    Procedure Summary       Date: 01/24/24 Room / Location: [facility] [place]    Anesthesia Start: 0758 Anesthesia Stop: 0817    Procedure: CARDIOVERSION EXTERNAL Diagnosis: A-fib (CMS/HCC)    Scheduled Providers: Randy Thomas MD; Dalton Ortiz MD; NAWAF [place]-CRNA Responsible Provider: Dalton Ortiz MD    Anesthesia Type: general ASA Status: 3            Anesthesia Type: general    Vitals Value Taken Time   /75 01/24/24 0817   Temp 36 °C (96.8 °F) 01/24/24 0817   Pulse 72 01/24/24 0817   Resp 16 01/24/24 0817   SpO2 100 % 01/24/24 0817       Anesthesia Post Evaluation    Patient location during evaluation: PACU  Patient participation: complete - patient participated  Level of consciousness: awake  Pain management: adequate  Multimodal analgesia pain management approach  Airway patency: patent  Cardiovascular status: acceptable  Respiratory status: acceptable  Hydration status: acceptable  Postoperative Nausea and Vomiting: none  Comments: Will continue to assess PONV status.    No notable events documented.

## 2024-01-24 NOTE — POST-PROCEDURE NOTE
Direct current cardioversion    Procedures  Direct current cardioversion (63963)    Patient history:  Please refer to the detailed history and physical on the patient's medical chart.     Procedure narrative:  The risks, benefits, and alternatives to the procedure and sedation were explained to the patient, and informed consent was obtained. The patient was in the fasting state. A grounding pad was placed. Self-adhesive anterior-posterior defibrillation pads were applied. A defibrillator was used for monitoring and the defibrillator waveform was set to biphasic. The patient was set up for continuous monitoring of surface 12 lead ECG, capnography, and pulse oximetry. Blood pressure was monitored with automatic cuff measurements. The procedure was performed under IV conscious sedation supplemented with intermittent deep sedation.   Patient reported taking their anticoagulation Apixaban 5mg BID without interruption in the last 4-6 weeks.    When pt had been adequately sedated, they were cardioverted with a 200J biphasic shock.  This restored sinus rhythm which was confirmed by tele and 12-lead ECG.      Summary:  Patient was successfully cardioverted from Sinus rhythm and Atrial fibrillation to Sinus rhythm    Patient Instructions:  - No Driving or making legal decisions for 24 hours  - DO NOT Stop your blood thinner (Apixaban 5mg BID) unless emergency without checking in with your doctor    Follow up:   Tentatively patient will be discharged Today, following bed rest and subsequent ambulation, provided the recovery parameters are appropriate.   Resume AC Apixaban 5mg BID, DO NOT Stop your blood thinner unless emergency without checking in with your doctor.  No driving, alcohol or making legal decisions for 24 hours.  Plan for follow up with patient's cardiologist/electrophysiologist as scheduled    See complete procedural log and parameters.

## 2024-01-24 NOTE — ANESTHESIA PREPROCEDURE EVALUATION
Patient: Jazzy Schaeffer    Procedure Information       Date/Time: 01/24/24 4761    Scheduled providers: Randy Thomas MD; Dalton Ortiz MD; NAWAF [place]-MACARIO    Procedure: CARDIOVERSION EXTERNAL    Location: [facility]            Relevant Problems   Anesthesia (within normal limits)      Cardiovascular   (+) Atherosclerotic heart disease of native coronary artery without angina pectoris   (+) Essential hypertension   (+) Hyperlipidemia   (+) PVD (peripheral vascular disease) (CMS/HCC)   (+) Valvular heart disease      GI   (+) Esophageal reflux      /Renal   (+) Kidney stone      Neuro/Psych   (+) Anxiety   (+) Depression   (+) Lumbar radiculitis   (+) Stroke (CMS/HCC)      Pulmonary   (+) COPD (chronic obstructive pulmonary disease) (CMS/[facility])   (+) Chronic bronchitis (CMS/[facility])   (+) Obstructive sleep apnea   (+) Restrictive lung disease      Musculoskeletal   (+) Cervical spondylosis with myelopathy   (+) Degenerative cervical spinal stenosis   (+) Lumbar canal stenosis      Eyes, Ears, Nose, and Throat   (+) Asymmetric SNHL (sensorineural hearing loss)   (+) Bilateral sensorineural hearing loss      Other   (+) Adhesive capsulitis of left shoulder   (+) Arthritis       Clinical information reviewed:                   NPO Detail:  No data recorded     Physical Exam    Airway  Mallampati: III  TM distance: >3 FB  Neck ROM: full     Cardiovascular    Dental    Pulmonary    Abdominal      Other findings: Poor dentition        Anesthesia Plan    History of general anesthesia?: yes  History of complications of general anesthesia?: no    ASA 3     general     The patient is not a current smoker.    intravenous induction   Anesthetic plan and risks discussed with patient.    Plan discussed with CRNA.

## 2024-01-24 NOTE — H&P
History Of Present Illness  Jazzy Schaeffer is a 78 y.o. female presenting with atrial fibrillation. Patient denies missing any doses of Eliquis.      Past Medical History  She has a past medical history of Adhesive capsulitis of left shoulder (08/04/2022) and Personal history of other benign neoplasm.    Surgical History  She has a past surgical history that includes Breast lumpectomy (11/17/2014); Colonoscopy (09/09/2015); Other surgical history (07/11/2017); Colon surgery (07/11/2017); BI mammo guided breast left localization (Left, 4/8/2019); CT angio coronary art with heartflow if score >30% (1/30/2020); MR angio head wo IV contrast (3/30/2022); MR angio neck wo IV contrast (3/30/2022); CT angio head w and wo IV contrast (3/31/2022); CT angio neck (3/31/2022); and BI mammo guided breast right localization (Right, 11/3/2017).     Social History  She reports that she quit smoking about 2 years ago. Her smoking use included cigarettes. She has never used smokeless tobacco. She reports that she does not drink alcohol and does not use drugs.    Family History  No family history on file.     Allergies  Patient has no known allergies.    Home Medications  Current Outpatient Medications on File Prior to Encounter   Medication Sig Dispense Refill    amLODIPine (Norvasc) 5 mg tablet Take 1 tablet (5 mg) by mouth once daily.      aspirin 81 mg EC tablet Take 1 tablet (81 mg) by mouth once daily.      Eliquis 5 mg tablet Take 1 tablet (5 mg) by mouth 2 times a day.      fluticasone (Flonase) 50 mcg/actuation nasal spray Administer 1 spray into each nostril once daily. Shake gently. Before first use, prime pump. After use, clean tip and replace cap. 16 g 1    furosemide (Lasix) 20 mg tablet Take 1 tablet (20 mg) by mouth once daily. 90 tablet 3    guaiFENesin (Mucinex) 600 mg 12 hr tablet Take 1 tablet (600 mg) by mouth 2 times a day as needed for cough. Do not crush, chew, or split.      losartan (Cozaar) 100 mg tablet  Take 0.5 tablets (50 mg) by mouth once daily.      metoprolol tartrate (Lopressor) 50 mg tablet Take 1 tablet (50 mg) by mouth 2 times a day. Take 50 mg in the morning and 25 mg in the evening 180 tablet 3    rosuvastatin (Crestor) 5 mg tablet Take 1 tablet (5 mg) by mouth once daily.      traZODone (Desyrel) 50 mg tablet Take 1 tablet (50 mg) by mouth as needed at bedtime for sleep. 90 tablet 0    losartan (Cozaar) 25 mg tablet Take 1 tablet (25 mg) by mouth once daily. (Patient not taking: Reported on 1/22/2024) 90 tablet 3    meclizine (Antivert) 12.5 mg tablet Take 1 tablet (12.5 mg) by mouth 3 times a day as needed for dizziness. 30 tablet 1    nitroglycerin (Nitrostat) 0.4 mg SL tablet Place 1 tablet (0.4 mg) under the tongue every 5 minutes if needed for chest pain. 25 tablet 3    polyethylene glycol (Glycolax, Miralax) 17 gram packet Take 17 g by mouth once daily. (Patient not taking: Reported on 1/22/2024)      [DISCONTINUED] metoprolol tartrate (Lopressor) 25 mg tablet Take 50 mg in the morning and 25 mg in the evening 270 tablet 3    [DISCONTINUED] nitroglycerin (Nitrostat) 0.4 mg SL tablet Place under the tongue.       No current facility-administered medications on file prior to encounter.          Inpatient Medications:  Scheduled medications   Medication Dose Route Frequency     PRN medications   Medication     Continuous Medications   Medication Dose Last Rate    sodium chloride 0.9%  100 mL/hr           Review of Systems   Constitutional:  Positive for activity change and fatigue.   Eyes: Negative.    Respiratory:  Positive for shortness of breath.    Cardiovascular:  Positive for palpitations.   Gastrointestinal: Negative.    Endocrine: Negative.    Genitourinary: Negative.    Musculoskeletal: Negative.    Skin: Negative.    Allergic/Immunologic: Negative.    Neurological: Negative.    Hematological: Negative.    Psychiatric/Behavioral: Negative.            Physical Exam  Constitutional:        "General: She is not in acute distress.  HENT:      Mouth/Throat:      Mouth: Mucous membranes are moist.      Pharynx: Oropharynx is clear.   Eyes:      Conjunctiva/sclera: Conjunctivae normal.   Cardiovascular:      Rate and Rhythm: Tachycardia present. Rhythm irregularly irregular.      Pulses:           Radial pulses are 2+ on the right side and 2+ on the left side.        Dorsalis pedis pulses are 2+ on the right side and 2+ on the left side.      Heart sounds: Normal heart sounds. No murmur heard.  Pulmonary:      Effort: Pulmonary effort is normal.      Breath sounds: Normal breath sounds.   Abdominal:      General: Bowel sounds are normal.      Palpations: Abdomen is soft.   Musculoskeletal:      Cervical back: Normal range of motion.      Right lower le+ Edema present.      Left lower le+ Edema present.   Skin:     General: Skin is warm and dry.   Neurological:      General: No focal deficit present.      Mental Status: She is alert and oriented to person, place, and time.   Psychiatric:         Mood and Affect: Mood normal.         Thought Content: Thought content normal.        Sedation Plan    ASA 3     Mallampati class: III.         Last Recorded Vitals  Blood pressure (!) 146/104, pulse 85, resp. rate 18, height 1.651 m (5' 5\"), weight 83.5 kg (184 lb), SpO2 98 %.    Relevant Results    Labs    CBC:   Recent Labs     23  1239 23  0608 23  1927 23  0733 11/15/23  0736 23  0914   WBC 5.4 6.1 7.3 7.5 11.8* 13.9*   HGB 11.6* 10.7* 11.4* 10.8* 11.8* 11.7*   HCT 38.4 35.3* 37.4 35.0* 38.0 37.9    246 248 243 214 211   MCV 84 84 82 83 83 83     BMP/CMP:   Recent Labs     23  1239 23  0608 23  0733 11/15/23  0736 23  0914 23  0436 23  1517 23  1517 23  0928 22  1547 22  1706    136 139 141 140 141   < > 139 139 138 141   K 4.1 3.8 4.1 4.0 3.3* 3.5   < > 4.2 4.3 4.0 4.4    106 103 104 101 102   < " "> 103 104 102 104   BUN 14 13 16 18 20 13   < > 10 13 10 13   CREATININE 0.90 1.08* 1.10* 1.24* 1.42* 1.25*   < > 1.08* 0.69 0.59 0.66   CO2 28 25 25 26 30 30   < > 24 28 29 29   CALCIUM 9.5 8.5* 8.7 8.6 8.5* 9.2   < > 9.2 9.7 9.7 9.3   PROT 6.3*  --   --   --   --  6.7  --  6.9 7.1 7.3 6.5   BILITOT 0.5  --   --   --   --  0.6  --  0.6 0.4 0.4 0.3   ALKPHOS 75  --   --   --   --  69  --  74 75 76 64   ALT 21  --   --   --   --  79*  --  53* 10 9 12   AST 17  --   --   --   --  81*  --  79* 16 14 13   GLUCOSE 106* 99 82 86 151* 99   < > 85 89 84 75    < > = values in this interval not displayed.      Lipid Panel:   Recent Labs     01/12/23  0928 03/31/22  0728 10/15/21  0850 07/08/21  0818 05/03/21  0829 11/05/20  0810 01/30/20  0812 06/26/19  1010 02/18/19  0943 06/02/18  0834 10/31/17  1136   CHOL 133 106 123 121 125 113 180 144 142 142 177   HDL 55.1 45.1 46.8 42.6 46.2 45.9 47.6 43.6 37.6* 43.5 46.6   CHHDL 2.4 2.4 2.6 2.8 2.7 2.5 3.8  --  3.8 3.3 3.8   VLDL 16 15 12 17 17 15 19  --  16 18 18   TRIG 80 75 61 83 83 76 93  --  80 89 91     Cardiac       No lab exists for component: \"CK\", \"CKMBP\"   Hemoglobin A1C:   Recent Labs     11/12/23  2235 01/12/23  0928 03/31/22  0325 10/15/21  0850 05/03/21  0829 11/05/20  0810 01/30/20  0812   HGBA1C 5.6 5.7* 5.5 5.6 5.5 5.5 6.0     TSH/ Free T4:   Recent Labs     12/26/23  1239 01/12/23  0928 10/15/21  0850 05/03/21  0829 11/09/19  1830 08/06/19  0909   TSH 1.07 1.78 1.44 1.29 0.86 1.39     Iron:   Recent Labs     12/26/23  1239 11/12/23  1517 03/31/22  0325 05/03/21  0828 01/28/20  2319   FERRITIN 112  --   --  99  --    TIBC 278  --   --  345  --    IRONSAT 16*  --   --  17*  --    BNP  --  699* 541*  --  187*     Coag:     ABO: No results found for: \"ABO\"    Past Cardiology Tests (Last 3 Years):  EKG:  Encounter Date: 01/22/24   ECG 12 lead (Clinic Performed)   Result Value    Ventricular Rate 106    Atrial Rate 375    QRS Duration 86    QT Interval 356    QTC " Calculation(Bazett) 472    R Axis 19    T Axis -83    QRS Count 17    Q Onset 221    T Offset 399    QTC Fredericia 430    Narrative    Atrial fibrillation with rapid ventricular response  Nonspecific ST and T wave abnormality  Abnormal ECG  When compared with ECG of 08-JAN-2024 08:03,  No significant change was found  Confirmed by Randy Thomas ([id number]) on 1/22/2024 4:00:43 PM     Echo:  Results for orders placed during the hospital encounter of 11/12/23    Transthoracic Echo (TTE) Complete    Narrative  [facility], [address] [place] [address]  Tel [phone] and Fax [phone]    TRANSTHORACIC ECHOCARDIOGRAM REPORT      Patient Name:      MO Urias Physician:    [id number] Heraclio Goyal MD  Study Date:        11/14/2023           Ordering Provider:    [id number] ANNE WHITTEN  MRN/PID:           [id number]             Fellow:  Accession#:        [id number]         Nurse:                Antonia Brandon RN  Date of Birth/Age: 1945 / 78 years Sonographer:          Abby Devries  RACHEL  Gender:            F                    Additional Staff:  Height:            165.10 cm            Admit Date:           11/12/2023  Weight:            83.92 kg             Admission Status:     Inpatient -  Routine  BSA:               1.91 m2              Encounter#:           [id number]  Department Location:  [facility] Non  Invasive  Blood Pressure: 108 /64 mmHg    Study Type:    TRANSTHORACIC ECHO (TTE) COMPLETE  Diagnosis/ICD: Paroxysmal atrial fibrillation-I48.0  Indication:    Atrial Fibrillation  CPT Code:      Echo Complete w Full [id number]    Patient History:  Smoker:            Current.  Pertinent History: CVA, HTN, Hyperlipidemia and COPD. Renal Infarct, PVD, GERD,  Dizziness.    Study Detail: The following Echo studies were performed: 2D, M-Mode, Doppler and  color flow. Technically challenging study due to body habitus and  prominent lung artifact. Definity  used as a contrast agent for  endocardial border definition and agitated saline used as a  contrast agent for intraseptal flow evaluation. Total contrast  used for this procedure was 3.0 mL via IV push.      PHYSICIAN INTERPRETATION:  Left Ventricle: The left ventricular systolic function is mildly decreased, with an estimated ejection fraction of 45-50%. Wall motion is abnormal. The left ventricular cavity size is normal. Spectral Doppler shows a restrictive pattern of left ventricular diastolic filling. There are elevated left atrial and left ventricular end diastolic pressures. There is hypokinesis of apico-septal and apico-inferior along with basal inferior and infero-lateral segments.  Left Atrium: The left atrium is severely dilated. There is no evidence of a patent foramen ovale. A bubble study using agitated saline was performed. Bubble study is negative.  Right Ventricle: The right ventricle is normal in size. There is normal right ventricular global systolic function.  Right Atrium: The right atrium is moderately to severely dilated.  Aortic Valve: The aortic valve appears structurally normal. There is no evidence of aortic valve regurgitation. The peak instantaneous gradient of the aortic valve is 6.2 mmHg.  Mitral Valve: The mitral valve is normal in structure. There is no evidence of mitral valve regurgitation.  Tricuspid Valve: The tricuspid valve is structurally normal. There is mild tricuspid regurgitation. The Doppler estimated RVSP is mildly elevated at 37.3 mmHg.  Pulmonic Valve: The pulmonic valve is structurally normal. There is no indication of pulmonic valve regurgitation.  Pericardium: There is a small pericardial effusion.  Aorta: The aortic root is normal.  In comparison to the previous echocardiogram(s): Compared with study from 3/31/2022, no significant change.      CONCLUSIONS:  1. Left ventricular systolic function is mildly decreased with a 45-50% estimated ejection fraction.  2. There  is hypokinesis of apico-septal and apico-inferior along with basal inferior and infero-lateral segments.  3. Spectral Doppler shows a restrictive pattern of left ventricular diastolic filling.  4. There are elevated left atrial and left ventricular end diastolic pressures.  5. The left atrium is severely dilated.  6. The right atrium is moderately to severely dilated.  7. Mildly elevated RVSP.  8. There is no evidence of a patent foramen ovale.  9. Compared with study from 3/31/2022, no significant change.    QUANTITATIVE DATA SUMMARY:  2D MEASUREMENTS:  Normal Ranges:  Ao Root d:     2.90 cm   (2.0-3.7cm)  LAs:           4.20 cm   (2.7-4.0cm)  IVSd:          0.90 cm   (0.6-1.1cm)  LVPWd:         1.00 cm   (0.6-1.1cm)  LVIDd:         5.10 cm   (3.9-5.9cm)  LVIDs:         3.80 cm  LV Mass Index: 91.8 g/m2  LV % FS        25.5 %    LA VOLUME:  Normal Ranges:  LA Vol A4C:        129.6 ml   (22+/-6mL/m2)  LA Vol A2C:        145.4 ml  LA Vol BP:         138.2 ml  LA Vol Index A4C:  67.7ml/m2  LA Vol Index A2C:  76.0 ml/m2  LA Vol Index BP:   72.2 ml/m2  LA Area A4C:       32.9 cm2  LA Area A2C:       35.1 cm2  LA Major Axis A4C: 7.1 cm  LA Major Axis A2C: 7.2 cm  LA Volume Index:   72.2 ml/m2    AORTA MEASUREMENTS:  Normal Ranges:  Asc Ao, d: 3.10 cm (2.1-3.4cm)    LV SYSTOLIC FUNCTION BY 2D PLANIMETRY (MOD):  Normal Ranges:  EF-A4C View: 46.2 % (>=55%)  EF-A2C View: 46.2 %  EF-Biplane:  46.5 %    LV DIASTOLIC FUNCTION:  Normal Ranges:  MV Peak E:      1.28 m/s   (0.7-1.2 m/s)  MV Peak A:      0.45 m/s   (0.42-0.7 m/s)  E/A Ratio:      2.84       (1.0-2.2)  MV e'           0.04 m/s   (>8.0)  MV lateral e'   0.05 m/s  MV medial e'    0.04 m/s  MV A Dur:       92.00 msec  E/e' Ratio:     28.44      (<8.0)  PulmV Sys Dyllan:  43.30 cm/s  PulmV Rojas Dyllan: 73.30 cm/s  PulmV S/D Dyllan:  0.60    MITRAL VALVE:  Normal Ranges:  MV DT: 119 msec (150-240msec)    AORTIC VALVE:  Normal Ranges:  AoV Vmax:      1.25 m/s (<=1.7m/s)  AoV Peak  P.2 mmHg (<20mmHg)  LVOT Max Dyllan:  0.89 m/s (<=1.1m/s)  LVOT VTI:      14.60 cm  LVOT Diameter: 2.00 cm  (1.8-2.4cm)  AoV Area,Vmax: 2.25 cm2 (2.5-4.5cm2)      RIGHT VENTRICLE:  RV Basal 4.10 cm  RV Mid   2.80 cm  RV Major 7.0 cm  TAPSE:   16.8 mm  RV s'    0.12 m/s    TRICUSPID VALVE/RVSP:  Normal Ranges:  Peak TR Velocity: 2.93 m/s  RV Syst Pressure: 37.3 mmHg (< 30mmHg)    PULMONIC VALVE:  Normal Ranges:  PV Max Dyllan: 0.9 m/s  (0.6-0.9m/s)  PV Max PG:  3.2 mmHg    Pulmonary Veins:  PulmV Rojas Dyllan: 73.30 cm/s  PulmV S/D Dyllan:  0.60  PulmV Sys Dyllan:  43.30 cm/s      [id number] Heraclio Goyal MD  Electronically signed on 2023 at 6:10:02 PM        ** Final **    Ejection Fractions:  LV biplane EF   Date/Time Value Ref Range Status   2023 04:46 PM 46       Cath:  No results found for this or any previous visit.    Stress Test:  Results for orders placed in visit on 22    Nuclear Stress Test    Narrative  MRN: [id number]  Patient Name: MO GUERRERO    STUDY:  CARDIAC STRESS/REST INJECTION; CARDIAC STRESS/REST (MYOCARDIAL  PERFUSION/MIBI); PART 2 STRESS OR REST (NO CHARGE);  2022 10:57 am    INDICATION:  NSTEMI, high risk, HLD, HTN, smoker, DM demand ischemia, seeing   Cardiology outpatient .    COMPARISON:  None.    ACCESSION NUMBER(S):  [id number]; [id number]; [id number]    ORDERING CLINICIAN:  CARMEN HURT    TECHNIQUE:  DIVISION OF NUCLEAR MEDICINE  PHARMACOLOGIC STRESS MYOCARDIAL PERFUSION SCAN, ONE DAY PROTOCOL    The patient received an intravenous dose of  12.0 mCi of Tc-99m  Myoview and resting emission tomographic (SPECT) images of the  myocardium were acquired. The patient then received an intravenous  infusion of 0.4mg regadenoson (Lexiscan)  followed by an additional  dose of  35.0 mCi of Tc-99m  Myoview. Stress phase SPECT images of  the myocardium were then acquired. These included ECG-gated images to  assess and quantify ventricular function.  A low-dose, nondiagnostic  regional CT was  utilized for attenuation correction purposes.    FINDINGS:  Both stress and rest studies demonstrate small territory of severe  perfusion defect involving the apex associated with minimal  reversibility at rest. There is otherwise grossly normal perfusion  preserved throughout the remainder of the LV myocardium on both  stress and rest studies.    The left ventricle is at the upper limits of normal in size.    Gated images demonstrate mild apical hypokinesis with otherwise  grossly normal LV wall motion with an LV EF estimated at 47% at rest  and 51% post stress.    Attenuation correction CT images demonstrate no gross anatomic  abnormalities.    Impression  1.  Limited territory of prior largely completed myocardial  infarction involving the apex, associated with only minimal  carl-infarct ischemia.  2. Otherwise grossly normal perfusion preserved throughout the  remainder of the LV myocardium without evidence of additional  territory of ischemia or prior infarction.  3. The left ventricle is at the upper limits of normal in size.  4. Gated images demonstrate mild apical hypokinesis in the territory  of prior infarction with otherwise preserved normal LV wall motion  with an LV EF estimated at 47-51%.      I personally reviewed the images/study and I agree with the findings  as stated. This study was interpreted at [place], [place].    Cardiac Imaging:  Results for orders placed in visit on 01/28/20    CT ANGIO HEART CORONARY    Narrative  MRN: [id number]  Patient Name: MO GUERRERO    STUDY:  CTA CORONARY ART WITH HEARTFLOW IF SCORE >30%.; 1/30/2020 4:35 pm    INDICATION:  chest pain, Lie Flat: Yes.    COMPARISON:  None.    ACCESSION NUMBER(S):  [id number]    ORDERING CLINICIAN:  VIC CUNNINGHAM    TECHNIQUE:  Using multi-detector CT technology, axial, sequential imaging with  prospective gating was performed of the chest following the  intravenous administration of  contrast material.  A low-osmolar  contrast agent was used (80 ml of Optiray 350). In addition, CT-FFR  analysis was also performed.    The patient was premedicated with 0.4 mg sublingual nitroglycerin for  coronary dilation.    For optimization of anatomic evaluation, multiplanar reconstruction,  maximum intensity projections, and advanced 3-D off-line  postprocessing were performed on a dedicated stand-alone workstation  under the direct supervision of the interpreting physician.    CT Dose-Length Product (DLP):  229 mGy/cm  CT Dose Reduction Employed: Yes (Prospective triggering, iterative  reconstruction)    FINDINGS:  POTENTIAL STUDY LIMITATIONS: None.    CORONARY ARTERIES:    CORONARY ANATOMY:  There is normal origin of the coronary arteries.    LEFT MAIN CORONARY ARTERY:  The left main is normal sized vessel that bifurcates into the LAD and  circumflex.  There is no significant atherosclerotic change or stenotic disease.    LEFT ANTERIOR DESCENDING ARTERY:  The LAD is a normal size vessel that wraps around the apex.  It gives rise to 2 acute diagonal branches.  There is mild diffuse atherosclerosis noted in the LAD. There is  ostial disease in the 2nd diagonal branch. There is diffuse distal  disease with extensive calcification and decreased distal vessel  runoff. CT FFR of the proximal vessels is unremarkable. The distal  vessels can not be evaluated due to they are caliber and contrast  enhancement.    LEFT CIRCUMFLEX ARTERY:  The LCX is a normal size vessel, which is non-dominant.  It gives rise to 3 obtuse marginal branches.  There is mild luminal irregularities noted.    RIGHT CORONARY ARTERY:  The RCA is a normal size vessel, which is dominant .  It gives rise to a conus branch, holden branch, and 1 acute marginal  branches.  In its distal segment it bifurcates into the PDA and PV  branch.  There is mild diffuse disease. There is a focal calcified plaque  noted in the distal RCA with estimated luminal  stenosis of less than  50%. CT FFR is negative    CARDIAC CHAMBERS:  The cardiac chambers demonstrate normal atrioventricular and  ventriculoarterial concordance, and systemic and pulmonary venous  return.    LEFT ATRIUM:  Normal size (37.54-cm2)    RIGHT ATRIUM:  Normal size (20.15-cm2)    INTERATRIAL SEPTUM:  Intact.    LEFT VENTRICLE:  Normal size (4.9-cm)    RIGHT VENTRICLE:  Normal size (4-cm)    AORTIC VALVE:  The aortic valve is trileaflet in morphology. No calcifications.    MITRAL VALVE:  No thickening/calcification.    THORACIC AORTA:  The visualized thoracic aorta is normal in course, caliber, and  contour.  There is no acute aortic pathology, such as dissection, intramural  hematoma, or contained rupture.  The aortic arch is not included on this examination.    PERICARDIUM:  There is no pericardial effusion of thickening.    CHEST:  The chest wall is normal.  No significant lymphadenopathy or mass is seen in limited images of  the mediastinum.  There are extensive subpleural fibrotic changes within the anterior  portions of the left upper lobe consistent with radiation changes.  Patient is status post mastectomy..  No pleural effusion or pneumothorax.    UPPER ABDOMEN:  Limited imaging through the upper abdomen reveals no abnormalities of  the visualized organs.    Impression  1. Right dominant system.  2. Extensive distal disease of the LAD and 2nd diagonal branch. The  distribution of disease with the consistent with prior radiation. No  flow limiting stenosis within the proximal vessel.  3. Distal RCA disease with estimated luminal stenosis of less than  50%.  4. Luminal irregularities noted in the left circumflex.      Results for orders placed in visit on 18    MR CARDIAC W AND WO IV CONTRAST W REGADENOSON STRESS FOR MORPH/FUNCT AND VALVE DZ    [facility]    CMR Report    MRN:                    [id number]  Name:         MO GUERRERO  :                   1945  Scan Date:   2018-07-05 09:36:14    Electronically signed by Estee, Sanjay 2018-Jul-05 12:12:00    VITALS  =====================================================================  =====================================    HEIGHT/WEIGHT  ---------------------------------------------------------------------  -------------------  HEIGHT:  65.00 in  165.10 cm  WEIGHT:  205.03 lbs    BSA/BP  ---------------------------------------------------------------------  -------------------  BSA:  2.00 m\S\2  SYSTOLIC BP:  152 mmHg  DIASTOLIC BP:  78 mmHg    HEART RATE/RHYTHM  ---------------------------------------------------------------------  -------------------  HEART RHYTHM:  Sinus Rhythm      FINAL    Impression  =====================================================================  =====================================    1. No evidence of stress induced ischemia on Regadenoson perfusion  MRI.  2. Dense mid-myocardial scar in the interventricular septum in a  classic non-ischemic cardiomyopathic  distribution likely related to hypertensive heart disease. No  evidence of amyloidosis. Diagnoses such as  inflammatory cardiomyopathies are less likely.    SUMMARY  =====================================================================  =====================================        NURSING REPORT    73 y/o female presents for a Regadenoson Cardiac  MRI stress test to  evaluate for chest pain R07.9  PMH includes CAD, COPD, Hyperlipidemia, Claudication, HTN, and  former smoker .  Allergies:  NKDA  Current Medications ? Almodipine, Aspirin, Lasix, Losartan,  Metoprolol, Pravastatin, Singular, Albuterol,  and Symbicort  Pt denies any chest pain this AM.  Lung sounds clear to auscultation  bilaterally and normal S1 and S2 heart  sounds.    Vital Signs:  Sitting- /78 HR  65 Temp ? 96.1SP02 96% on RA  Standing - /82 HR 65    Regadenoson  0.4mg IVPgiven at:   10:03By:  Madyson Murray, RN  Aminophyline 100 mg  IVP given at:  10:08By:  Madyson Murray RN    Vital Signs Stress:  2 min s/p Lexy injection-  BP  151/89HR  47   c/o Dyspnea and  Nausea.SP02 97%  4 min s/p Lexy injection-  BP  141/77HR  90   c/o Headache and  Nausea.    SP02  96 %    Vital Signs Recovery:  10:10 BP  151/78 HR 77 SP02  95 %Pt. denies symptoms  10:12 /77 HR 72 SP02 94 % No symptoms  10:14 /74 HR 68 SP02 96%No symptoms    LEFT VENTRICLE: LV wall thickness is normal. LV cavity size is  normal. LV systolic function is normal. Quantitative LVEF  58%. There is no LV mass/thrombus.    VIABILITY: LV infarct/scar size is 5%.    STRESS: Stress perfusion imaging is normal.    RIGHT VENTRICLE: RV wall thickness is normal. RV cavity size is  normal. RV systolic function is normal. There is no RV  mass/thrombus.    LV/RV SEPTUM: The ventricular septum is intact.    LA/RA SEPTUM: The atrial septum is intact.    LEFT ATRIUM: LA is moderately enlarged.    RIGHT ATRIUM: RA is mildly enlarged. There is no RA mass/thrombus.    PERICARDIUM: Pericardium is normal. There is no pericardial effusion.    PLEURAL EFFUSION: There is no pleural effusion.    AORTIC VALVE: The aortic valve annulus is normal in size. Aortic  valve is trileaflet. There is trivial aortic  regurgitation. Aortic regurgitant volume 1.07ml. Aortic regurgitant  fraction 1.58%. Peak aortic valve  velocity 106.34cm/sec.    MITRAL VALVE: Mitral valve is mildly thickened. There is trivial  mitral regurgitation.    TRICUSPID VALVE: Tricuspid valve leaflets are normal. There is  trivial tricuspid regurgitation.    AORTIC ROOT: The aortic root is normal.      CORE EXAM  =====================================================================  =====================================    MEASUREMENTS  ---------------------------------------------------------------------  -------------------  VOLUMETRIC  ANALYSIS  ----------------------------------------------  .------------------------------------------------------.  .      .           . LV   . Reference . RV . Reference .  +------+-----------+------+-----------+----+-----------+  . EDV  . ml        .  129 .  () .    .           .  .      . ml/m\S\2    . 64.5 .  (50-84)  .    .           .  . ESV  . ml        .   54 .  (18-55)  .    .           .  .      . ml/m\S\2    . 27.0 .  (12-30)  .    .           .  . CO   . L/min     . 5.10 .           .    .           .  .      . L/min/m\S\2 .  2.5 .           .    .           .  . MASS . g         .  134 .  () .    .           .  .      . g/m\S\2     . 67.0 .  (49-78)  .    .           .  . SV   . ml        .   75 .  () .    .           .  .      . ml/m\S\2    . 37.5 .  (34-59)  .    .           .  . EF   . %         .   58 .  (60-78)  .    .           .  '------+-----------+------+-----------+----+-----------'    CARDIAC OUTPUT HR:  68 BPM  LA DIMENSIONS (LV SYSTOLE)  ----------------------------------------------  AREA - 2 CHAMBER:  36 cm\S\2  LENGTH - 2 CHAMBER:  7.1 cm  AREA - 4 CHAMBER:  65 cm\S\2  LENGTH - 4 CHAMBER:  6.5 cm  VOLUME:  306 ml      17 SEGMENT  ---------------------------------------------------------------------  -------------------  .---------------------------------------------------------------------  ---------------------.  . Segments           . Wall Motion  . Hyperenhancement . Stress  Perfusion . Interpretation .  +--------------------+--------------+------------------+--------------  ----+----------------+  . Base Anterior      . Normal/Hyper . None             . Normal  .                .  . Base Anteroseptal  . Normal/Hyper . 1-25%            . Normal  . Non-CAD Scar   .  . Base Inferoseptal  . Normal/Hyper . 1-25%            . Normal  . Non-CAD Scar   .  . Base Inferior      . Normal/Hyper . 1-25%            . Normal  . Non-CAD Scar   .  . Base Inferolateral . Normal/Hyper  . None             . Normal  .                .  . Base Anterolateral . Normal/Hyper . None             . Normal  .                .  . Mid Anterior       . Normal/Hyper . None             . Normal  .                .  . Mid Anteroseptal   . Normal/Hyper . 1-25%            . Normal  .                .  . Mid Inferoseptal   . Normal/Hyper . 1-25%            . Normal  . Non-CAD Scar   .  . Mid Inferior       . Normal/Hyper . 1-25%            . Normal  . Non-CAD Scar   .  . Mid Inferolateral  . Normal/Hyper . None             . Normal  .                .  . Mid Anterolateral  . Normal/Hyper . None             . Normal  .                .  . Apical Anterior    . Normal/Hyper . None             . Normal  .                .  . Apical Septal      . Normal/Hyper . 1-25%            . Normal  . Non-CAD Scar   .  . Apical Inferior    . Normal/Hyper . None             . Normal  .                .  . Apical Lateral     . Normal/Hyper . None             . Normal  .                .  . [place]               . Normal/Hyper . None             . Normal  .                .  +--------------------+--------------+------------------+--------------  ----+----------------+  . RV Segments        . Wall Motion  . Hyperenhancement . Stress  Perfusion . Interpretation .  +--------------------+--------------+------------------+--------------  ----+----------------+  . RV Basal Anterior  . Normal/Hyper . None             . Normal  .                .  . RV Basal Inferior  . Normal/Hyper . None             . Normal  .                .  . RV Mid             . Normal/Hyper . None             . Normal  .                .  . RV Apical          . Normal/Hyper . None             . Normal  .                .  '--------------------+--------------+------------------+--------------  ----+----------------'    FINDINGS  ----------------------------------------------  INFARCT/SCAR SIZE:  5  %      STRESS  =====================================================================  =====================================    STRESS PROTOCOL  ---------------------------------------------------------------------  -------------------  Regadenoson    RESTING DATA  ---------------------------------------------------------------------  -------------------  SYSTOLIC BP:  152 mmHg  DIASTOLIC BP:  78 mmHg  RESTING HR:  65 BPM  MEDICATIONS TAKEN TODAY:  B-Blockers, Ca-Blockers  ECG:  NSR    STRESS DATA  ---------------------------------------------------------------------  -------------------  REASON FOR TERMINATION:  Protocol Complete  COMPLICATIONS:  None  POST STRESS ECG:  No change from pre-stress ECG      SCAN INFO  =====================================================================  =====================================    GENERAL  ---------------------------------------------------------------------  -------------------  CONTRAST AGENT  ----------------------------------------------  TYPE:  Multihance  SERUM CREATININE:  0.12 sCr  GFR:  814.28 ml/min/1.73m\S\2  CREATININE DATE:  2018-06-03 00:00:00    SEDATION  ----------------------------------------------  SEDATION USED?:  No    MEDICATION ADMINISTERED DURING SCAN  ----------------------------------------------  TYPE:  Other(mg)...  OTHER(MG), SPECIFY::  Regadenoson  OTHER(MG), TOTAL DOSE:  0.4 mg    PULSE SEQUENCES  ----------------------------------------------  Single-Shot SSFP, IR GRE - Segmented, SSFP Cine, SR GRE  Perfusion, Phase Contrast Velocity Mapping    SETUP  ----------------------------------------------  TYPE:  Clinical  INPATIENT:  No  INCOMPLETE SCAN:  No  REASON(S) FOR SCAN:  Chest pain  TECHNOLOGIST:  Silke Rice      Report generated by [employer], a product of [employer]      === 07/28/23 ===    MR BRAIN WO CONTRAST    - Impression -  1. No acute intracranial abnormality.    2. Old infarct in the left occipital lobe with  hemosiderin deposition.    3. Findings of probable chronic small vessel ischemic changes.    This study was interpreted at [facility].    MACRO:  None  === 11/12/23 ===    CT HEAD WO IV CONTRAST    - Impression -  There is an area of encephalomalacia again noted along the medial  left occipital lobe.    There is again evidence of similar mild brain parenchymal volume loss.    Scattered nonspecific white matter changes are again noted within the  cerebral hemispheres bilaterally as well as vague hypodensity  overlying the brainstem which while nonspecific, given the patient's  age, likely represent sequelae of small-vessel ischemic change.  Additional punctate hypodensities are identified within the  subinsular regions and basal ganglia bilaterally suggesting  incidental mildly prominent perivascular spaces and/or small  scattered lacunar infarctions.    MACRO:  None.    Signed by: Randy Grey 11/15/2023 1:55 PM  Dictation workstation:   [id number]     Assessment/Plan  Assessment/Plan   Active Problems:  There are no active Hospital Problems.        Atrial Fibrillation  -DCCV with Dr. Thomas 1/24/24       I spent 30 minutes in the professional and overall care of this patient.      Lexie Sosa, NAWAF-CNP

## 2024-01-25 ENCOUNTER — HOSPITAL ENCOUNTER (OUTPATIENT)
Dept: CARDIOLOGY | Facility: HOSPITAL | Age: 79
Discharge: HOME | End: 2024-01-25
Payer: MEDICARE

## 2024-01-25 LAB
ATRIAL RATE: 76 BPM
P AXIS: 59 DEGREES
P OFFSET: 215 MS
P ONSET: 140 MS
PR INTERVAL: 160 MS
Q ONSET: 220 MS
QRS COUNT: 13 BEATS
QRS DURATION: 88 MS
QT INTERVAL: 392 MS
QTC CALCULATION(BAZETT): 441 MS
QTC FREDERICIA: 424 MS
R AXIS: 22 DEGREES
T AXIS: 42 DEGREES
T OFFSET: 416 MS
VENTRICULAR RATE: 76 BPM

## 2024-01-25 PROCEDURE — 93005 ELECTROCARDIOGRAM TRACING: CPT

## 2024-01-30 ASSESSMENT — ENCOUNTER SYMPTOMS
PSYCHIATRIC NEGATIVE: 1
MUSCULOSKELETAL NEGATIVE: 1
HEMATOLOGIC/LYMPHATIC NEGATIVE: 1
SHORTNESS OF BREATH: 1
ENDOCRINE NEGATIVE: 1
EYES NEGATIVE: 1
GASTROINTESTINAL NEGATIVE: 1
FATIGUE: 1
NEUROLOGICAL NEGATIVE: 1
ALLERGIC/IMMUNOLOGIC NEGATIVE: 1
PALPITATIONS: 1
ACTIVITY CHANGE: 1

## 2024-01-31 ENCOUNTER — ANCILLARY PROCEDURE (OUTPATIENT)
Dept: CARDIOLOGY | Facility: CLINIC | Age: 79
End: 2024-01-31
Payer: MEDICARE

## 2024-01-31 DIAGNOSIS — I48.91 A-FIB (MULTI): ICD-10-CM

## 2024-01-31 LAB
ATRIAL RATE: 250 BPM
Q ONSET: 221 MS
QRS COUNT: 14 BEATS
QRS DURATION: 88 MS
QT INTERVAL: 384 MS
QTC CALCULATION(BAZETT): 446 MS
QTC FREDERICIA: 424 MS
R AXIS: 76 DEGREES
T AXIS: 30 DEGREES
T OFFSET: 413 MS
VENTRICULAR RATE: 81 BPM

## 2024-01-31 PROCEDURE — 93010 ELECTROCARDIOGRAM REPORT: CPT | Performed by: INTERNAL MEDICINE

## 2024-01-31 PROCEDURE — 93005 ELECTROCARDIOGRAM TRACING: CPT

## 2024-02-12 ENCOUNTER — APPOINTMENT (OUTPATIENT)
Dept: CARDIOLOGY | Facility: HOSPITAL | Age: 79
End: 2024-02-12
Payer: MEDICARE

## 2024-02-15 LAB — BODY SURFACE AREA: 1.96 M2

## 2024-03-11 ENCOUNTER — HOSPITAL ENCOUNTER (OUTPATIENT)
Dept: CARDIOLOGY | Facility: CLINIC | Age: 79
Discharge: HOME | End: 2024-03-11
Payer: MEDICARE

## 2024-03-11 ENCOUNTER — OFFICE VISIT (OUTPATIENT)
Dept: CARDIOLOGY | Facility: CLINIC | Age: 79
End: 2024-03-11
Payer: MEDICARE

## 2024-03-11 VITALS
DIASTOLIC BLOOD PRESSURE: 80 MMHG | BODY MASS INDEX: 31.36 KG/M2 | OXYGEN SATURATION: 100 % | HEART RATE: 81 BPM | WEIGHT: 188.2 LBS | SYSTOLIC BLOOD PRESSURE: 135 MMHG | HEIGHT: 65 IN

## 2024-03-11 DIAGNOSIS — I25.10 ATHEROSCLEROSIS OF NATIVE CORONARY ARTERY, UNSPECIFIED WHETHER ANGINA PRESENT, UNSPECIFIED WHETHER NATIVE OR TRANSPLANTED HEART: Primary | ICD-10-CM

## 2024-03-11 PROCEDURE — 3075F SYST BP GE 130 - 139MM HG: CPT | Performed by: INTERNAL MEDICINE

## 2024-03-11 PROCEDURE — 99214 OFFICE O/P EST MOD 30 MIN: CPT | Performed by: INTERNAL MEDICINE

## 2024-03-11 PROCEDURE — 93010 ELECTROCARDIOGRAM REPORT: CPT | Performed by: INTERNAL MEDICINE

## 2024-03-11 PROCEDURE — 3079F DIAST BP 80-89 MM HG: CPT | Performed by: INTERNAL MEDICINE

## 2024-03-11 PROCEDURE — 1036F TOBACCO NON-USER: CPT | Performed by: INTERNAL MEDICINE

## 2024-03-11 PROCEDURE — 93005 ELECTROCARDIOGRAM TRACING: CPT

## 2024-03-11 PROCEDURE — 1160F RVW MEDS BY RX/DR IN RCRD: CPT | Performed by: INTERNAL MEDICINE

## 2024-03-11 PROCEDURE — 1126F AMNT PAIN NOTED NONE PRSNT: CPT | Performed by: INTERNAL MEDICINE

## 2024-03-11 PROCEDURE — 1159F MED LIST DOCD IN RCRD: CPT | Performed by: INTERNAL MEDICINE

## 2024-03-11 ASSESSMENT — PAIN SCALES - GENERAL: PAINLEVEL: 0-NO PAIN

## 2024-03-11 NOTE — PROGRESS NOTES
Subjective:  Patient returns for a routine follow-up.  She underwent cardioversion about 6 weeks ago for atrial fibrillation.  She did feel significantly better for a few days after the cardioversion.  Unfortunately, she is still struggling with marked fatigue and activity intolerance at this time.  Of note, she did go out of rhythm by 1 week after her cardioversion as documented on her repeat EKG.  She remains frustrated with her symptomatology.  She has been compliant with all of her medications and is tolerating them well.    Objective:  General: Alert, usual pleasant self.  HEENT: Unchanged.  Lungs: Generally clear.  Cardiac: Distant heart tones without change.  Abdomen: Nontender.  Extremities: No edema.  Skin: No acute rash.  Neuro: Grossly intact.    EKG: Atrial flutter with controlled ventricular response rate.  Slow R wave progression.    Impression/plan:  Jazzy is struggling a bit at this time.  She is a 78-year-old woman we follow for hypertension and hyperlipidemia.  She also has moderate LAD and diagonal branch disease noted at remote catheterization.  We have been able to manage this with medical therapy.  Her heart rate and blood pressure are under reasonably good control at this time.    She unfortunately continues to struggle with some fatigue and activity tolerance.  I do think her atrial fibrillation is contributing significantly to her symptomatology.  I reviewed this concern with her, and told her we did need her to get in for EP consultation to sort out whether she is a candidate for antiarrhythmic therapy or ablation to try to restore her to sinus rhythm.  I will get her in to see Dr. Hogue in the near future and will await his recommendations.  I elected to embark on no other testing at this time and will not make any medication changes until she has this EP consultation done.  I will plan on seeing her back in 3 months.  She knows to call for any abrupt change in her  condition.    Patient instructions:    Continue current medications unchanged.    Report for your EP consultation when scheduled.    Return to clinic in 3 months.

## 2024-03-12 ENCOUNTER — APPOINTMENT (OUTPATIENT)
Dept: PRIMARY CARE | Facility: CLINIC | Age: 79
End: 2024-03-12
Payer: MEDICARE

## 2024-03-12 LAB
ATRIAL RATE: 394 BPM
P AXIS: 83 DEGREES
Q ONSET: 221 MS
QRS COUNT: 14 BEATS
QRS DURATION: 86 MS
QT INTERVAL: 382 MS
QTC CALCULATION(BAZETT): 443 MS
QTC FREDERICIA: 422 MS
R AXIS: 75 DEGREES
T AXIS: 10 DEGREES
T OFFSET: 412 MS
VENTRICULAR RATE: 81 BPM

## 2024-03-20 PROBLEM — Q40.1 CONGENITAL HIATUS HERNIA: Status: RESOLVED | Noted: 2024-03-20 | Resolved: 2024-03-20

## 2024-03-20 PROBLEM — I48.91 ATRIAL FIBRILLATION (MULTI): Status: ACTIVE | Noted: 2022-04-01

## 2024-03-20 PROBLEM — K27.9 PEPTIC ULCER: Status: ACTIVE | Noted: 2024-03-20

## 2024-03-20 PROBLEM — E66.9 OBESITY: Status: ACTIVE | Noted: 2024-03-20
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
## 2024-03-20 PROBLEM — N17.9 ACUTE RENAL FAILURE SYNDROME (CMS-HCC): Status: ACTIVE | Noted: 2024-03-20

## 2024-03-20 PROBLEM — I50.9 HEART FAILURE (MULTI): Status: ACTIVE | Noted: 2024-03-20

## 2024-03-20 PROBLEM — Z86.73 HISTORY OF CARDIOEMBOLIC CEREBROVASCULAR ACCIDENT (CVA): Status: ACTIVE | Noted: 2024-03-20

## 2024-03-20 PROBLEM — Z85.9 HISTORY OF MALIGNANT NEOPLASM: Status: ACTIVE | Noted: 2023-04-04

## 2024-03-20 PROBLEM — R20.0 NUMBNESS OF FACE: Status: RESOLVED | Noted: 2024-03-20 | Resolved: 2024-03-20

## 2024-03-20 PROBLEM — I50.9 ACUTE ON CHRONIC CONGESTIVE HEART FAILURE (MULTI): Status: ACTIVE | Noted: 2023-11-12

## 2024-03-20 PROBLEM — D12.4 BENIGN NEOPLASM OF DESCENDING COLON: Status: RESOLVED | Noted: 2023-04-04 | Resolved: 2024-03-20

## 2024-03-20 PROBLEM — N28.89: Status: ACTIVE | Noted: 2024-03-20

## 2024-03-20 PROBLEM — R11.0 NAUSEA: Status: ACTIVE | Noted: 2024-03-20

## 2024-03-20 PROBLEM — Z85.3 HISTORY OF MALIGNANT NEOPLASM OF BREAST: Status: ACTIVE | Noted: 2022-04-01

## 2024-03-20 PROBLEM — Z86.73 HISTORY OF CEREBROVASCULAR ACCIDENT: Status: ACTIVE | Noted: 2024-03-20

## 2024-03-20 PROBLEM — K29.90 GASTRODUODENITIS: Status: RESOLVED | Noted: 2024-03-20 | Resolved: 2024-03-20

## 2024-03-20 PROBLEM — D64.9 ANEMIA, UNSPECIFIED: Status: ACTIVE | Noted: 2023-12-26

## 2024-03-20 NOTE — PROGRESS NOTES
"I had the pleasure seeing Jazzy Schaeffer     Chief Complaint   Patient presents with    Atrial Fibrillation    Atrial Flutter     OUTPATIENT CONSULTATION: Cardiac Electrophysiology  DOS: March 21, 2024  REASON: Paroxysmal AF  REFERRING PHYSICIAN: Randy Thomas           Current Outpatient Medications   Medication Instructions    amLODIPine (Norvasc) 5 mg tablet 1 tablet, oral, Daily    aspirin 81 mg EC tablet 1 tablet, oral, Daily    Eliquis 5 mg, oral, 2 times daily, Please resume tonight 1/24/24    fluticasone (Flonase) 50 mcg/actuation nasal spray 1 spray, Each Nostril, Daily, Shake gently. Before first use, prime pump. After use, clean tip and replace cap.    furosemide (LASIX) 20 mg, oral, Daily    guaiFENesin (MUCINEX) 600 mg, oral, 2 times daily PRN, Do not crush, chew, or split.    losartan (COZAAR) 50 mg, oral, Daily, And 50 mg at bedtime    meclizine (ANTIVERT) 12.5 mg, oral, 3 times daily PRN    metoprolol tartrate (LOPRESSOR) 50 mg, oral, 2 times daily, Take 50 mg in the morning and 25 mg in the evening    nitroglycerin (NITROSTAT) 0.4 mg, sublingual, Every 5 min PRN    rosuvastatin (Crestor) 5 mg tablet 1 tablet, oral, Daily    traZODone (DESYREL) 50 mg, oral, Nightly PRN      Jazzy Schaeffer is a 78 y.o. with:     Anxiety, GERD, HTN, hx of falls   EVA and COPD  Lt Breast Ca and Bladder Ca  Polymyalgia Rheumatica   Claudication, PVD, and CAD (mod LAD and diagonal branch disease)   CVA and TIA   CHF  Persistent AF/ AFL - Index diagnosis in December of 2023 probably s/p successful DCCV (1/24/24).  EKG (3/11/24) AFL      TESTING    -ECHO/ TTE:  (11/14/23) LVEF 45-50%.  LV restrictive pattern diastolic filling.  Hypokinesis of apico-septal and apico-inferior along with basal inferior and infero-lateral segments.  Sev LAE and mod-sev FRANCIS.  RVSP 37.3 mmHg.  LVIDd 5.1cm     Objective   Physical Exam  /82 (BP Location: Right arm, Patient Position: Sitting)   Pulse 87   Ht 1.651 m (5' 5\")   Wt " 84.1 kg (185 lb 6.4 oz)   BMI 30.85 kg/m²     General Appearance:  Alert, cooperative, no distress, appears stated age   Head:  Normocephalic, without obvious abnormality, atraumatic   Eyes:  PERRL, conjunctiva/corneas clear, EOM's intact, fundi benign, both eyes   Ears:  Normal TM's and external ear canals, both ears   Nose: Nares normal, septum midline, mucosa normal, no drainage or sinus tenderness   Throat: Lips, mucosa, and tongue normal; teeth and gums normal   Neck: Supple, symmetrical, trachea midline, no adenopathy, thyroid: not enlarged, symmetric, no tenderness/mass/nodules, no carotid bruit or JVD   Back:   Symmetric, no curvature, ROM normal, no CVA tenderness   Lungs:   Clear to auscultation bilaterally, respirations unlabored   Chest Wall:  No tenderness or deformity   Heart:  Regular rate and rhythm, S1, S2 normal, no murmur, rub or gallop   Abdomen:   Soft, non-tender, bowel sounds active all four quadrants,  no masses, no organomegaly   Genitalia:  Normal male   Rectal:  Normal tone, normal prostate, no masses or tenderness;  guaiac negative stool   Extremities: Extremities normal, atraumatic, no cyanosis or edema   Pulses: 2+ and symmetric   Skin: Skin color, texture, turgor normal, no rashes or lesions   Lymph nodes: Cervical, supraclavicular, and axillary nodes normal   Neurologic: Normal           Assessment/Plan   Persistent atrial fibrillation with a recent onset. She is fairly symptomatic with shortness of breath and lack of energy. She would benefit from rhythm control.     We had a lengthy discussion regarding the atrial fibrillation, rate control, rhythm control. We also discussed antiarrhythmics and ablation. She clearly prefers antiarrhythmics and I feel that is reasonable considering also severely dilated left atria. We will consider bringing her for Dofetilide loading. Her GFR is adequate. She is not taking any medications that might be a contraindication (trazodone was stopped).  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 This is not a contraindication for amiodarone though.

## 2024-03-21 ENCOUNTER — OFFICE VISIT (OUTPATIENT)
Dept: CARDIOLOGY | Facility: CLINIC | Age: 79
End: 2024-03-21
Payer: MEDICARE

## 2024-03-21 VITALS
DIASTOLIC BLOOD PRESSURE: 82 MMHG | BODY MASS INDEX: 30.89 KG/M2 | HEIGHT: 65 IN | WEIGHT: 185.4 LBS | SYSTOLIC BLOOD PRESSURE: 127 MMHG | HEART RATE: 87 BPM

## 2024-03-21 DIAGNOSIS — I48.0 PAROXYSMAL ATRIAL FIBRILLATION (MULTI): ICD-10-CM

## 2024-03-21 DIAGNOSIS — I25.10 ATHEROSCLEROSIS OF NATIVE CORONARY ARTERY, UNSPECIFIED WHETHER ANGINA PRESENT, UNSPECIFIED WHETHER NATIVE OR TRANSPLANTED HEART: ICD-10-CM

## 2024-03-21 PROCEDURE — 1036F TOBACCO NON-USER: CPT | Performed by: INTERNAL MEDICINE

## 2024-03-21 PROCEDURE — 3079F DIAST BP 80-89 MM HG: CPT | Performed by: INTERNAL MEDICINE

## 2024-03-21 PROCEDURE — 99214 OFFICE O/P EST MOD 30 MIN: CPT | Performed by: INTERNAL MEDICINE

## 2024-03-21 PROCEDURE — 93005 ELECTROCARDIOGRAM TRACING: CPT | Performed by: INTERNAL MEDICINE

## 2024-03-21 PROCEDURE — 1159F MED LIST DOCD IN RCRD: CPT | Performed by: INTERNAL MEDICINE

## 2024-03-21 PROCEDURE — 1160F RVW MEDS BY RX/DR IN RCRD: CPT | Performed by: INTERNAL MEDICINE

## 2024-03-21 PROCEDURE — 93010 ELECTROCARDIOGRAM REPORT: CPT | Performed by: INTERNAL MEDICINE

## 2024-03-21 PROCEDURE — 3074F SYST BP LT 130 MM HG: CPT | Performed by: INTERNAL MEDICINE

## 2024-03-21 ASSESSMENT — PAIN - FUNCTIONAL ASSESSMENT: PAIN_FUNCTIONAL_ASSESSMENT: 0-10

## 2024-03-26 LAB
ATRIAL RATE: 267 BPM
Q ONSET: 220 MS
QRS COUNT: 15 BEATS
QRS DURATION: 88 MS
QT INTERVAL: 390 MS
QTC CALCULATION(BAZETT): 466 MS
QTC FREDERICIA: 440 MS
R AXIS: 29 DEGREES
T AXIS: 49 DEGREES
T OFFSET: 415 MS
VENTRICULAR RATE: 86 BPM

## 2024-03-27 ENCOUNTER — TELEPHONE (OUTPATIENT)
Dept: CARDIOLOGY | Facility: HOSPITAL | Age: 79
End: 2024-03-27
Payer: MEDICARE

## 2024-03-27 PROBLEM — Z79.899 ENCOUNTER FOR MONITORING DOFETILIDE THERAPY: Status: ACTIVE | Noted: 2024-03-27

## 2024-03-27 PROBLEM — Z51.81 ENCOUNTER FOR MONITORING DOFETILIDE THERAPY: Status: ACTIVE | Noted: 2024-03-27

## 2024-03-27 NOTE — TELEPHONE ENCOUNTER
PHONE NOTE:    Pt is for elective Tikosyn initiation admission per Dr Hogue for AFL. Records reviewed. Spoke to pt today 3/27. Explained to pt the need for minimum 3 night inpatient stay for tele/QTc monitoring, and that there is a rare risk of Torsades during Tikosyn initiation.  Pt is aware DCCV may be required during this admission if doesn't convert pharmacologically and that a ride home from hospital is necessary due to potential for DCCV with use of conscious sedation. All questions were answered. States she doesn't drive so someone will bring her.     Pt is agreeable to Tikosyn admission Tues 4/2/24 and was advised that admit date is dependent on bed availability at [facility]. Pt was advised I would call her mid-morning on 4/2 with bed status update. OAC is Eliquis and pt denies any missed doses in the past 30 days.  Pt will call her insurance company to see if generic dofetilide is affordable for her and agrees to inform us either way. Pt is aware to call Dr Hogue's office if any concerns or changes in status in the interim.     Current meds were reviewed w/ pt via phone: of the meds pt read to me today, there are no absolute contraindications with Tikosyn.     30 minute phone call with pt.     ELZA Mendoza, PA-C, [facility]  Inpatient Cardiac Electrophysiology

## 2024-03-28 RX ORDER — BISMUTH SUBSALICYLATE 262 MG
1 TABLET,CHEWABLE ORAL DAILY
COMMUNITY

## 2024-03-28 RX ORDER — ACETAMINOPHEN 500 MG
5000 TABLET ORAL DAILY
COMMUNITY

## 2024-03-28 RX ORDER — LANOLIN ALCOHOL/MO/W.PET/CERES
1000 CREAM (GRAM) TOPICAL DAILY
COMMUNITY

## 2024-04-02 ENCOUNTER — HOSPITAL ENCOUNTER (INPATIENT)
Facility: HOSPITAL | Age: 79
LOS: 3 days | Discharge: HOME | DRG: 309 | End: 2024-04-05
Attending: INTERNAL MEDICINE | Admitting: INTERNAL MEDICINE
Payer: MEDICARE

## 2024-04-02 ENCOUNTER — APPOINTMENT (OUTPATIENT)
Dept: CARDIOLOGY | Facility: HOSPITAL | Age: 79
DRG: 309 | End: 2024-04-02
Payer: MEDICARE

## 2024-04-02 DIAGNOSIS — Z79.899 ENCOUNTER FOR MONITORING DOFETILIDE THERAPY: Primary | ICD-10-CM

## 2024-04-02 DIAGNOSIS — Z51.81 ENCOUNTER FOR MONITORING DOFETILIDE THERAPY: Primary | ICD-10-CM

## 2024-04-02 DIAGNOSIS — I48.91 ATRIAL FIBRILLATION (MULTI): ICD-10-CM

## 2024-04-02 LAB
ANION GAP SERPL CALC-SCNC: 15 MMOL/L (ref 10–20)
ATRIAL RATE: 208 BPM
BUN SERPL-MCNC: 15 MG/DL (ref 6–23)
CALCIUM SERPL-MCNC: 9.3 MG/DL (ref 8.6–10.6)
CHLORIDE SERPL-SCNC: 105 MMOL/L (ref 98–107)
CO2 SERPL-SCNC: 25 MMOL/L (ref 21–32)
CREAT SERPL-MCNC: 0.95 MG/DL (ref 0.5–1.05)
EGFRCR SERPLBLD CKD-EPI 2021: 62 ML/MIN/1.73M*2
ERYTHROCYTE [DISTWIDTH] IN BLOOD BY AUTOMATED COUNT: 16.5 % (ref 11.5–14.5)
GLUCOSE BLD MANUAL STRIP-MCNC: 101 MG/DL (ref 74–99)
GLUCOSE SERPL-MCNC: 73 MG/DL (ref 74–99)
HCT VFR BLD AUTO: 42.5 % (ref 36–46)
HGB BLD-MCNC: 12.8 G/DL (ref 12–16)
MAGNESIUM SERPL-MCNC: 1.8 MG/DL (ref 1.6–2.4)
MAGNESIUM SERPL-MCNC: 1.91 MG/DL (ref 1.6–2.4)
MCH RBC QN AUTO: 24 PG (ref 26–34)
MCHC RBC AUTO-ENTMCNC: 30.1 G/DL (ref 32–36)
MCV RBC AUTO: 80 FL (ref 80–100)
NRBC BLD-RTO: 0 /100 WBCS (ref 0–0)
PLATELET # BLD AUTO: 249 X10*3/UL (ref 150–450)
POTASSIUM SERPL-SCNC: 3.8 MMOL/L (ref 3.5–5.3)
POTASSIUM SERPL-SCNC: 5.6 MMOL/L (ref 3.5–5.3)
Q ONSET: 221 MS
QRS COUNT: 14 BEATS
QRS DURATION: 84 MS
QT INTERVAL: 368 MS
QTC CALCULATION(BAZETT): 440 MS
QTC FREDERICIA: 415 MS
R AXIS: 26 DEGREES
RBC # BLD AUTO: 5.33 X10*6/UL (ref 4–5.2)
SODIUM SERPL-SCNC: 139 MMOL/L (ref 136–145)
T AXIS: 47 DEGREES
T OFFSET: 405 MS
VENTRICULAR RATE: 86 BPM
WBC # BLD AUTO: 5.2 X10*3/UL (ref 4.4–11.3)

## 2024-04-02 PROCEDURE — 82374 ASSAY BLOOD CARBON DIOXIDE: CPT | Performed by: PHYSICIAN ASSISTANT

## 2024-04-02 PROCEDURE — 83735 ASSAY OF MAGNESIUM: CPT | Performed by: PHYSICIAN ASSISTANT

## 2024-04-02 PROCEDURE — 2500000004 HC RX 250 GENERAL PHARMACY W/ HCPCS (ALT 636 FOR OP/ED): Performed by: PHYSICIAN ASSISTANT

## 2024-04-02 PROCEDURE — 93005 ELECTROCARDIOGRAM TRACING: CPT

## 2024-04-02 PROCEDURE — 93010 ELECTROCARDIOGRAM REPORT: CPT | Performed by: INTERNAL MEDICINE

## 2024-04-02 PROCEDURE — 1200000002 HC GENERAL ROOM WITH TELEMETRY DAILY

## 2024-04-02 PROCEDURE — 84132 ASSAY OF SERUM POTASSIUM: CPT | Performed by: PHYSICIAN ASSISTANT

## 2024-04-02 PROCEDURE — 85027 COMPLETE CBC AUTOMATED: CPT | Performed by: PHYSICIAN ASSISTANT

## 2024-04-02 PROCEDURE — 36415 COLL VENOUS BLD VENIPUNCTURE: CPT | Performed by: PHYSICIAN ASSISTANT

## 2024-04-02 PROCEDURE — 82947 ASSAY GLUCOSE BLOOD QUANT: CPT

## 2024-04-02 PROCEDURE — 2500000002 HC RX 250 W HCPCS SELF ADMINISTERED DRUGS (ALT 637 FOR MEDICARE OP, ALT 636 FOR OP/ED): Performed by: PHYSICIAN ASSISTANT

## 2024-04-02 PROCEDURE — 2500000001 HC RX 250 WO HCPCS SELF ADMINISTERED DRUGS (ALT 637 FOR MEDICARE OP): Performed by: PHYSICIAN ASSISTANT

## 2024-04-02 PROCEDURE — 99223 1ST HOSP IP/OBS HIGH 75: CPT | Performed by: PHYSICIAN ASSISTANT

## 2024-04-02 RX ORDER — METOPROLOL TARTRATE 50 MG/1
50 TABLET ORAL EVERY MORNING
Status: DISCONTINUED | OUTPATIENT
Start: 2024-04-03 | End: 2024-04-03

## 2024-04-02 RX ORDER — DOFETILIDE 0.25 MG/1
250 CAPSULE ORAL EVERY 12 HOURS
Status: DISCONTINUED | OUTPATIENT
Start: 2024-04-02 | End: 2024-04-05 | Stop reason: HOSPADM

## 2024-04-02 RX ORDER — ROSUVASTATIN CALCIUM 5 MG/1
5 TABLET, COATED ORAL NIGHTLY
Status: DISCONTINUED | OUTPATIENT
Start: 2024-04-02 | End: 2024-04-05 | Stop reason: HOSPADM

## 2024-04-02 RX ORDER — FLUTICASONE PROPIONATE 50 MCG
1 SPRAY, SUSPENSION (ML) NASAL DAILY PRN
Status: DISCONTINUED | OUTPATIENT
Start: 2024-04-02 | End: 2024-04-05 | Stop reason: HOSPADM

## 2024-04-02 RX ORDER — NITROGLYCERIN 0.4 MG/1
0.4 TABLET SUBLINGUAL EVERY 5 MIN PRN
Status: DISCONTINUED | OUTPATIENT
Start: 2024-04-02 | End: 2024-04-05 | Stop reason: HOSPADM

## 2024-04-02 RX ORDER — LOSARTAN POTASSIUM 25 MG/1
25 TABLET ORAL DAILY
Status: DISCONTINUED | OUTPATIENT
Start: 2024-04-02 | End: 2024-04-05 | Stop reason: HOSPADM

## 2024-04-02 RX ORDER — FLUTICASONE PROPIONATE 50 MCG
1 SPRAY, SUSPENSION (ML) NASAL DAILY PRN
COMMUNITY

## 2024-04-02 RX ORDER — MAGNESIUM SULFATE HEPTAHYDRATE 40 MG/ML
2 INJECTION, SOLUTION INTRAVENOUS ONCE
Status: COMPLETED | OUTPATIENT
Start: 2024-04-02 | End: 2024-04-02

## 2024-04-02 RX ORDER — POTASSIUM CHLORIDE 20 MEQ/1
40 TABLET, EXTENDED RELEASE ORAL ONCE
Status: COMPLETED | OUTPATIENT
Start: 2024-04-02 | End: 2024-04-02

## 2024-04-02 RX ORDER — ACETAMINOPHEN 325 MG/1
650 TABLET ORAL EVERY 4 HOURS PRN
Status: DISCONTINUED | OUTPATIENT
Start: 2024-04-02 | End: 2024-04-05 | Stop reason: HOSPADM

## 2024-04-02 RX ORDER — METOPROLOL TARTRATE 25 MG/1
25 TABLET, FILM COATED ORAL NIGHTLY
Status: DISCONTINUED | OUTPATIENT
Start: 2024-04-02 | End: 2024-04-03

## 2024-04-02 RX ORDER — AMLODIPINE BESYLATE 5 MG/1
5 TABLET ORAL DAILY
Status: DISCONTINUED | OUTPATIENT
Start: 2024-04-02 | End: 2024-04-05 | Stop reason: HOSPADM

## 2024-04-02 RX ORDER — ASPIRIN 81 MG/1
81 TABLET ORAL DAILY
Status: DISCONTINUED | OUTPATIENT
Start: 2024-04-02 | End: 2024-04-05 | Stop reason: HOSPADM

## 2024-04-02 RX ORDER — FUROSEMIDE 20 MG/1
20 TABLET ORAL DAILY
Status: DISCONTINUED | OUTPATIENT
Start: 2024-04-02 | End: 2024-04-05 | Stop reason: HOSPADM

## 2024-04-02 RX ORDER — ACETAMINOPHEN 500 MG
5000 TABLET ORAL DAILY
Status: DISCONTINUED | OUTPATIENT
Start: 2024-04-02 | End: 2024-04-05 | Stop reason: HOSPADM

## 2024-04-02 RX ADMIN — METOPROLOL TARTRATE 25 MG: 25 TABLET, FILM COATED ORAL at 20:27

## 2024-04-02 RX ADMIN — DOFETILIDE 250 MCG: 250 CAPSULE ORAL at 21:19

## 2024-04-02 RX ADMIN — LOSARTAN POTASSIUM 25 MG: 25 TABLET, FILM COATED ORAL at 20:29

## 2024-04-02 RX ADMIN — ROSUVASTATIN CALCIUM 5 MG: 5 TABLET, COATED ORAL at 20:28

## 2024-04-02 RX ADMIN — POTASSIUM CHLORIDE 40 MEQ: 1500 TABLET, EXTENDED RELEASE ORAL at 17:03

## 2024-04-02 RX ADMIN — AMLODIPINE BESYLATE 5 MG: 5 TABLET ORAL at 20:28

## 2024-04-02 RX ADMIN — MAGNESIUM SULFATE HEPTAHYDRATE 2 G: 40 INJECTION, SOLUTION INTRAVENOUS at 17:05

## 2024-04-02 RX ADMIN — APIXABAN 5 MG: 5 TABLET, FILM COATED ORAL at 20:27

## 2024-04-02 SDOH — SOCIAL STABILITY: SOCIAL INSECURITY: DO YOU FEEL UNSAFE GOING BACK TO THE PLACE WHERE YOU ARE LIVING?: NO

## 2024-04-02 SDOH — SOCIAL STABILITY: SOCIAL INSECURITY: HAS ANYONE EVER THREATENED TO HURT YOUR FAMILY OR YOUR PETS?: NO

## 2024-04-02 SDOH — SOCIAL STABILITY: SOCIAL INSECURITY: DOES ANYONE TRY TO KEEP YOU FROM HAVING/CONTACTING OTHER FRIENDS OR DOING THINGS OUTSIDE YOUR HOME?: NO

## 2024-04-02 SDOH — SOCIAL STABILITY: SOCIAL INSECURITY: ARE THERE ANY APPARENT SIGNS OF INJURIES/BEHAVIORS THAT COULD BE RELATED TO ABUSE/NEGLECT?: NO

## 2024-04-02 SDOH — SOCIAL STABILITY: SOCIAL INSECURITY: ARE YOU OR HAVE YOU BEEN THREATENED OR ABUSED PHYSICALLY, EMOTIONALLY, OR SEXUALLY BY ANYONE?: NO

## 2024-04-02 SDOH — SOCIAL STABILITY: SOCIAL INSECURITY: ABUSE: ADULT

## 2024-04-02 SDOH — SOCIAL STABILITY: SOCIAL INSECURITY: DO YOU FEEL ANYONE HAS EXPLOITED OR TAKEN ADVANTAGE OF YOU FINANCIALLY OR OF YOUR PERSONAL PROPERTY?: NO

## 2024-04-02 SDOH — SOCIAL STABILITY: SOCIAL INSECURITY: HAVE YOU HAD THOUGHTS OF HARMING ANYONE ELSE?: NO

## 2024-04-02 SDOH — SOCIAL STABILITY: SOCIAL INSECURITY: WERE YOU ABLE TO COMPLETE ALL THE BEHAVIORAL HEALTH SCREENINGS?: YES

## 2024-04-02 ASSESSMENT — ACTIVITIES OF DAILY LIVING (ADL)
ADEQUATE_TO_COMPLETE_ADL: YES
PATIENT'S MEMORY ADEQUATE TO SAFELY COMPLETE DAILY ACTIVITIES?: YES
BATHING: INDEPENDENT
JUDGMENT_ADEQUATE_SAFELY_COMPLETE_DAILY_ACTIVITIES: YES
HEARING - RIGHT EAR: FUNCTIONAL
LACK_OF_TRANSPORTATION: NO
DRESSING YOURSELF: INDEPENDENT
TOILETING: INDEPENDENT
GROOMING: INDEPENDENT
HEARING - LEFT EAR: FUNCTIONAL
LACK_OF_TRANSPORTATION: NO
ASSISTIVE_DEVICE: EYEGLASSES
WALKS IN HOME: INDEPENDENT
FEEDING YOURSELF: INDEPENDENT

## 2024-04-02 ASSESSMENT — PAIN - FUNCTIONAL ASSESSMENT
PAIN_FUNCTIONAL_ASSESSMENT: 0-10
PAIN_FUNCTIONAL_ASSESSMENT: 0-10

## 2024-04-02 ASSESSMENT — COGNITIVE AND FUNCTIONAL STATUS - GENERAL
MOBILITY SCORE: 22
CLIMB 3 TO 5 STEPS WITH RAILING: A LOT
CLIMB 3 TO 5 STEPS WITH RAILING: A LOT
DAILY ACTIVITIY SCORE: 24
MOBILITY SCORE: 22
DAILY ACTIVITIY SCORE: 24
PATIENT BASELINE BEDBOUND: NO

## 2024-04-02 ASSESSMENT — COLUMBIA-SUICIDE SEVERITY RATING SCALE - C-SSRS
6. HAVE YOU EVER DONE ANYTHING, STARTED TO DO ANYTHING, OR PREPARED TO DO ANYTHING TO END YOUR LIFE?: NO
1. IN THE PAST MONTH, HAVE YOU WISHED YOU WERE DEAD OR WISHED YOU COULD GO TO SLEEP AND NOT WAKE UP?: NO
2. HAVE YOU ACTUALLY HAD ANY THOUGHTS OF KILLING YOURSELF?: NO

## 2024-04-02 ASSESSMENT — PATIENT HEALTH QUESTIONNAIRE - PHQ9
2. FEELING DOWN, DEPRESSED OR HOPELESS: NOT AT ALL
1. LITTLE INTEREST OR PLEASURE IN DOING THINGS: NOT AT ALL
SUM OF ALL RESPONSES TO PHQ9 QUESTIONS 1 & 2: 0

## 2024-04-02 ASSESSMENT — LIFESTYLE VARIABLES
AUDIT-C TOTAL SCORE: 0
HOW OFTEN DO YOU HAVE 6 OR MORE DRINKS ON ONE OCCASION: NEVER
HOW OFTEN DO YOU HAVE A DRINK CONTAINING ALCOHOL: NEVER
HOW MANY STANDARD DRINKS CONTAINING ALCOHOL DO YOU HAVE ON A TYPICAL DAY: PATIENT DOES NOT DRINK
AUDIT-C TOTAL SCORE: 0
SKIP TO QUESTIONS 9-10: 1

## 2024-04-02 ASSESSMENT — PAIN SCALES - GENERAL
PAINLEVEL_OUTOF10: 0 - NO PAIN
PAINLEVEL_OUTOF10: 0 - NO PAIN

## 2024-04-02 NOTE — DISCHARGE INSTRUCTIONS
IF YOU MISS A DOSE OF TIKOSYN (dofetilide), DO NOT TRY TO MAKE UP THE DOSE.  NEVER TAKE 2 DOSES AT THE SAME TIME.    *You should have bloodwork to check kidney function every 3-6 months with your primary provider or primary cardiologist while on Tikosyn.    *DO NOT take any herbs or supplements without first discussing with your healthcare provider or pharmacist. DO NOT eat grapefruit or drink grapefruit juice with Tikosyn (other citrus fruit is okay).  DO NOT break, chew, or open the capsules.    *You must tell any provider that treats you that you are on Tikosyn (due to potential for drug-drug interactions).  Keep an up-to-date medication list in your wallet (in addition to any list you keep in your phone).    * A website for lists of medications that can prolong the QT interval (what we measure on EKG during Tikosyn admission) is: [employer]    * If you feel as though you are out of rhythm again, please call Dr Hogue's office at [phone] or, if it's an emergency, please call 911 or have someone drive you to the nearest emergency room.    * Be careful not to run out of Tikosyn; get it refilled a week before you will run out. If you need more refills, please call Dr Hogue's office.     * We have prescribed dofetilide to [facility]. You must go to their website (listed below) to set up an account, order the dofetilide, and add your mailing address and insurance info. It will take approximately 5-7 business days for shipping. [email or web address]     * Continue to follow up with your primary cardiologist, primary care physician, and any other specialists you normally see.    FOLLOW UP:   1) Netta Pimentel CNP ( Electrophysiology) in 1 month as scheduled

## 2024-04-02 NOTE — H&P
"  CARDIAC ELECTROPHYSIOLOGY H+P     HPI  PCP: Dr Christopher Damico (new to pt) / CARDS: Dr KAYE Thomas  EP: Dr Hogue    77yo F with HTN, HLD, HFmEF (EF 45-50%), EVA (no CPAP), COPD(no O2/inhalers), CAD (mod LAD + diag dz), CVA (was not on OAC then) and persistent AF/AFL(Eliquis) dx 2023 presenting for Tikosyn initiation. Pt had renal infarct 2023 iso 4-5 days of not taking Eliquis. Had successful DCCV 24 but was in AFL by EKG 3/11/24. Pt was referred to Dr oHgue who saw pt 3/21/24 and options were discussed including AADs vs ablation. Pt does have severely dilated LA making ablation less likely to be successful. Pt preferred AAD and Dr Hogue recommended Tikosyn. Amio would be a less than ideal choice given radiation fibrosis and COPD by CT scan 2022. When in AF, pt states she is \"unaware\" but also states she has fatigue, MARION, and occas palps at night, States she's had bilat LE edema x 6 months (on Lasix). Pt admits to occas L chest tightness occurring mostly at rest when lying in bed, sometimes radiating to L arm and occas with dyspnea and dizziness; cannot state how long it lasts but typically takes 1 NTG (rarely 2) sublingual with complete relief. Pt denies CP ever awakens her from sleep nor does it usually occur w/activity. Pt denies syncope, PND, or orthopnea. Pt states she missed one dose of Eliquis Sat 3/30 when she went to a  (Dr Hogue aware).     *Echo 2023: EF 45-50%, DD, apico-septal and apico-inferior HK, NL RV size/fxn, severe LAE/mod-sev FRANCIS, mild TR, rvsp 37mm  *Renal duplex 2023: no hemodynamically signif JG; patent bilat renal veins  *Lexiscan nuc stress 2022: prior apical infarct w/ minimal carl-infarct ischemia, o/w grossly NL perfusion of remainder of LV myocardium, \"estimated EF 47-51%\"   *Echo 3/2022 (for stroke): EF 45%, bubble study negative  *Echo 2020: EF 45-50%, mild cLVH, apical HK, NL RV size/fxn, mod LAE/mild FRANCIS, mild MR/TR, rvsp 43mm  *[facility] " "2013 (per notes): 60% mLAD and D1 (medical mgmt)     PMH/PSH: as above +  -Admission 11/2023 for flank pain-->CT with renal infarct and patent renal vessels (A+V) in setting of not taking Eliquis for 4-5 days prior; NASIM resolved, went to SNF for 1 week  -COVID infx 2023 (not admitted  -PMR per EMR (pt unaware)  -Anxiety, GERD, hx of falls  -Left Breast CA s/p mastectomy, chemo, and XRT >20 yrs ago; pt denies bladder CA  -Claudication, \"PVD\" per EMR  -S/p total hyster, cervical disc surgery    FH: reviewed and not pertinent to presenting problem    SH: lives w/son and grandson, does not drive since stroke which pt states affected her vision; independent w/ADLs, occas uses cane at home  -Quit smoking 1 year ago after 30 yrs, was vaping for 1 year but quit recently  -rarely drinks 1 glass wine (last one at [place] 2023); denies recreational drugs  -2 cups coffee/day; denies other stimulants    ROS: Denies bleeding issues on Eliquis (no hematuria, hematemesis, hemoptysis, hematochezia, or melena); wt has been stable; no N/V/D/abd pain; no fever/chills/cough; denies any seizures; denies hx DVT or PE; occas has blurry vision and has not seen optometrist this year yet; denies liver or kidney issues; no DM or thyroid issues; no rashes. Gets \"allergy\" symptoms off.on most of the year w/nasal congestion (uses Flonase PRN)     Allergies: NKDA; denies food or latex allergy    Medications:  Current Outpatient Medications   Medication Instructions    amLODIPine (Norvasc) 5 mg tablet 1 tablet, oral, Daily    aspirin 81 mg EC tablet 1 tablet, oral, Daily    cholecalciferol (VITAMIN D-3) 5,000 Units, oral, Daily    cyanocobalamin (VITAMIN B-12) 1,000 mcg, oral, Daily    Eliquis 5 mg, oral, Every 12 hours, Please resume tonight 1/24/24    fluticasone (Flonase) 50 mcg/actuation nasal spray 1 spray, Each Nostril, Daily PRN, Shake gently. Before first use, prime pump. After use, clean tip and replace cap.    furosemide (LASIX) 20 mg, " "oral, Daily    losartan (COZAAR) 25 mg, oral, Daily         metoprolol tartrate (LOPRESSOR) 50 mg, oral, 2 times daily, Take 50 mg in the morning and 25 mg in the evening    multivitamin tablet 1 tablet, oral, Daily    nitroglycerin (NITROSTAT) 0.4 mg, sublingual, Every 5 min PRN    rosuvastatin (Crestor) 5 mg tablet 1 tablet, oral, Nightly         Last Recorded Vitals:         4/2/2024     1:37 PM             138          80          82          36.3 °C (97.4 °F)          18          1.651 m (5' 5\")          182.76          30.41 kg/m2          SpO2 97% RA               Results from last 7 days   Lab Units 04/02/24  1335   WBC AUTO x10*3/uL 5.2   HEMOGLOBIN g/dL 12.8   HEMATOCRIT % 42.5   PLATELETS AUTO x10*3/uL 249     Lab Units 04/02/24  1530 04/02/24  1335   SODIUM mmol/L  --  139   POTASSIUM mmol/L 3.8 5.6*mod hemolysis   CHLORIDE mmol/L  --  105   CO2 mmol/L  --  25   BUN mg/dL  --  15   CREATININE mg/dL  --  0.95   GLUCOSE mg/dL  --  73*   CALCIUM mg/dL  --  9.3     Lab Units 04/02/24  1530 04/02/24  1335   MAGNESIUM mg/dL 1.80 1.91 mod hemolysis     Baseline EKG 4/2/24 13:17--AF 80s, QRS 84, nsSTT changes without acute changes, absolute QT 360ms (QTc inaccurate due to irreg R-R interval)    Physical Exam  Gen-A+O x 3  Skin-W+D  Lungs-harsh bases otherwise CTAB  CV-irreg irreg, no M/G/R  Abd-soft, NT/ND, +BS  Extrem-1+ BLE edema; DP pulses 1+ bilat  Neuro-CHEUNG  Psych-appropriate mood and affect     Assessment/Plan   77yo F with HTN, HLD, HFmEF (EF 45-50%), EVA (no CPAP), COPD(no O2/inhalers), CAD (mod LAD + diag dz), CVA 2022(was not on OAC then),  renal infarct 11/2023 iso 4-5 days of not taking Eliquis. and persistent AF/AFL (Eliquis) dx 12/2023 presenting for Tikosyn initiation. Pt missed one dose Eliquis Sat 3/30 (Dr Hogue is aware).     Case was d/w Dr Hogue who reviewed baseline EKG. CrCl is 53.6ml/min thus we're starting Tikosyn 250mcg q12h tonight.  K and Mag were re-drawn for moderate hemolysis " and are being repleted (3.8, 1.8).     -Admit to [facility] for Tikosyn initiation per Dr Hogue  -Continuous tele / QTc monitoring  -EKG 2hrs post each dose  -Replete lytes PRN (K>4, Mg>2)  -Cont Eliquis 5mg q12h  -Cont rest of home meds  -DCCV Thurs 4/4 if doesn't convert pharmacologically  -Pt was given a 3-page Tikosyn education guide and is able to read    *EP fellow/HVI cards fellow moonlighter covers overnight 5:30p-7:30a--Pager [phone]; signout will be given.    ELZA Mendoza, PA-C, [facility]  Cardiac Electrophysiology  Personal pager (Tues-Fri): [phone]    Code status: Full Code    I spent 75 minutes in the professional and overall care of this patient.

## 2024-04-03 ENCOUNTER — APPOINTMENT (OUTPATIENT)
Dept: CARDIOLOGY | Facility: HOSPITAL | Age: 79
DRG: 309 | End: 2024-04-03
Payer: MEDICARE

## 2024-04-03 LAB
ANION GAP SERPL CALC-SCNC: 14 MMOL/L (ref 10–20)
ATRIAL RATE: 326 BPM
ATRIAL RATE: 340 BPM
BUN SERPL-MCNC: 18 MG/DL (ref 6–23)
CALCIUM SERPL-MCNC: 8.7 MG/DL (ref 8.6–10.6)
CHLORIDE SERPL-SCNC: 106 MMOL/L (ref 98–107)
CO2 SERPL-SCNC: 23 MMOL/L (ref 21–32)
CREAT SERPL-MCNC: 0.83 MG/DL (ref 0.5–1.05)
EGFRCR SERPLBLD CKD-EPI 2021: 73 ML/MIN/1.73M*2
GLUCOSE SERPL-MCNC: 129 MG/DL (ref 74–99)
MAGNESIUM SERPL-MCNC: 2.17 MG/DL (ref 1.6–2.4)
MAGNESIUM SERPL-MCNC: 2.35 MG/DL (ref 1.6–2.4)
POTASSIUM SERPL-SCNC: 4.4 MMOL/L (ref 3.5–5.3)
POTASSIUM SERPL-SCNC: 5.1 MMOL/L (ref 3.5–5.3)
Q ONSET: 220 MS
Q ONSET: 221 MS
QRS COUNT: 12 BEATS
QRS COUNT: 14 BEATS
QRS DURATION: 82 MS
QRS DURATION: 90 MS
QT INTERVAL: 374 MS
QT INTERVAL: 438 MS
QTC CALCULATION(BAZETT): 447 MS
QTC CALCULATION(BAZETT): 486 MS
QTC FREDERICIA: 421 MS
QTC FREDERICIA: 470 MS
R AXIS: 35 DEGREES
R AXIS: 36 DEGREES
SODIUM SERPL-SCNC: 138 MMOL/L (ref 136–145)
T AXIS: 43 DEGREES
T AXIS: 47 DEGREES
T OFFSET: 408 MS
T OFFSET: 439 MS
VENTRICULAR RATE: 74 BPM
VENTRICULAR RATE: 86 BPM

## 2024-04-03 PROCEDURE — 93005 ELECTROCARDIOGRAM TRACING: CPT

## 2024-04-03 PROCEDURE — 99233 SBSQ HOSP IP/OBS HIGH 50: CPT | Performed by: PHYSICIAN ASSISTANT

## 2024-04-03 PROCEDURE — 36415 COLL VENOUS BLD VENIPUNCTURE: CPT | Performed by: PHYSICIAN ASSISTANT

## 2024-04-03 PROCEDURE — 1200000002 HC GENERAL ROOM WITH TELEMETRY DAILY

## 2024-04-03 PROCEDURE — 93010 ELECTROCARDIOGRAM REPORT: CPT | Performed by: INTERNAL MEDICINE

## 2024-04-03 PROCEDURE — 83735 ASSAY OF MAGNESIUM: CPT | Performed by: PHYSICIAN ASSISTANT

## 2024-04-03 PROCEDURE — 2500000001 HC RX 250 WO HCPCS SELF ADMINISTERED DRUGS (ALT 637 FOR MEDICARE OP): Performed by: PHYSICIAN ASSISTANT

## 2024-04-03 PROCEDURE — 2500000002 HC RX 250 W HCPCS SELF ADMINISTERED DRUGS (ALT 637 FOR MEDICARE OP, ALT 636 FOR OP/ED): Performed by: PHYSICIAN ASSISTANT

## 2024-04-03 PROCEDURE — 84132 ASSAY OF SERUM POTASSIUM: CPT | Performed by: PHYSICIAN ASSISTANT

## 2024-04-03 PROCEDURE — 80048 BASIC METABOLIC PNL TOTAL CA: CPT | Performed by: PHYSICIAN ASSISTANT

## 2024-04-03 RX ORDER — METOPROLOL TARTRATE 25 MG/1
12.5 TABLET, FILM COATED ORAL 2 TIMES DAILY
Status: COMPLETED | OUTPATIENT
Start: 2024-04-03 | End: 2024-04-04

## 2024-04-03 RX ADMIN — METOPROLOL TARTRATE 50 MG: 50 TABLET, FILM COATED ORAL at 09:24

## 2024-04-03 RX ADMIN — LOSARTAN POTASSIUM 25 MG: 25 TABLET, FILM COATED ORAL at 21:00

## 2024-04-03 RX ADMIN — ASPIRIN 81 MG: 81 TABLET, COATED ORAL at 09:24

## 2024-04-03 RX ADMIN — APIXABAN 5 MG: 5 TABLET, FILM COATED ORAL at 09:25

## 2024-04-03 RX ADMIN — ROSUVASTATIN CALCIUM 5 MG: 5 TABLET, COATED ORAL at 21:00

## 2024-04-03 RX ADMIN — METOPROLOL TARTRATE 12.5 MG: 25 TABLET, FILM COATED ORAL at 20:59

## 2024-04-03 RX ADMIN — DOFETILIDE 250 MCG: 250 CAPSULE ORAL at 09:25

## 2024-04-03 RX ADMIN — AMLODIPINE BESYLATE 5 MG: 5 TABLET ORAL at 20:59

## 2024-04-03 RX ADMIN — CHOLECALCIFEROL TAB 125 MCG (5000 UNIT) 5000 UNITS: 125 TAB at 09:25

## 2024-04-03 RX ADMIN — FLUTICASONE PROPIONATE 1 SPRAY: 50 SPRAY, METERED NASAL at 09:24

## 2024-04-03 RX ADMIN — DOFETILIDE 250 MCG: 250 CAPSULE ORAL at 20:59

## 2024-04-03 RX ADMIN — APIXABAN 5 MG: 5 TABLET, FILM COATED ORAL at 21:00

## 2024-04-03 RX ADMIN — FUROSEMIDE 20 MG: 20 TABLET ORAL at 09:25

## 2024-04-03 RX ADMIN — ACETAMINOPHEN 650 MG: 325 TABLET ORAL at 05:05

## 2024-04-03 ASSESSMENT — COGNITIVE AND FUNCTIONAL STATUS - GENERAL
DAILY ACTIVITIY SCORE: 24
DAILY ACTIVITIY SCORE: 24
MOBILITY SCORE: 24
MOBILITY SCORE: 22
CLIMB 3 TO 5 STEPS WITH RAILING: A LOT

## 2024-04-03 ASSESSMENT — PAIN - FUNCTIONAL ASSESSMENT
PAIN_FUNCTIONAL_ASSESSMENT: 0-10

## 2024-04-03 ASSESSMENT — PAIN DESCRIPTION - LOCATION: LOCATION: HEAD

## 2024-04-03 ASSESSMENT — PAIN SCALES - GENERAL
PAINLEVEL_OUTOF10: 0 - NO PAIN
PAINLEVEL_OUTOF10: 0 - NO PAIN

## 2024-04-03 NOTE — SIGNIFICANT EVENT
Notified by RN that pt converted to NSR and she obtained EKG.    EKG 4/3/24 16:12-->SR 63, , QRS 90, QT 460ms/QTc 469ms (hand measured)    D/w Dr Hogue-->we will continue Tikosyn 250mcg q12h.    DCCV canceled for tomorrow (EP charge nurse aware), NPO past MN dc'd. Metop tartrate decreased to 12.5 BID given HR 60s. Will review HR trends tomorrow to see if further metop adjustment needed.    ELZA Mendoza, PA-C, [facility]  Inpatient Cardiac Electrophysiology  Personal pager: [phone] (Tues-Fri)

## 2024-04-03 NOTE — PROGRESS NOTES
Pharmacy Medication History Review    Jazzy Schaeffer is a 78 y.o. female admitted for Encounter for monitoring dofetilide therapy. Pharmacy reviewed the patient's gvljz-gl-lgvalchak medications and allergies for accuracy.    The list below reflects the updated PTA list. Comments regarding how patient may be taking medications differently can be found in the Admit Orders Activity  Prior to Admission Medications   Prescriptions Last Dose Informant Patient Reported? Taking?   Eliquis 5 mg tablet  Self Yes No   Sig: Take 1 tablet (5 mg) by mouth every 12 hours. Please resume tonight 1/24/24   amLODIPine (Norvasc) 5 mg tablet  Self Yes No   Sig: Take 1 tablet (5 mg) by mouth once daily.   aspirin 81 mg EC tablet  Self Yes No   Sig: Take 1 tablet (81 mg) by mouth once daily.   cholecalciferol (Vitamin D-3) 5,000 Units tablet  Self Yes No   Sig: Take 1 tablet (5,000 Units) by mouth once daily.   cyanocobalamin (Vitamin B-12) 1,000 mcg tablet  Self Yes No   Sig: Take 1 tablet (1,000 mcg) by mouth once daily.   fluticasone (Flonase) 50 mcg/actuation nasal spray  Self Yes No   Sig: Administer 1 spray into each nostril once daily as needed for rhinitis or allergies. Shake gently. Before first use, prime pump. After use, clean tip and replace cap.   furosemide (Lasix) 20 mg tablet  Self No No   Sig: Take 1 tablet (20 mg) by mouth once daily.   losartan (Cozaar) 25 mg tablet  Self Yes No   Sig: Take 1 tablet (25 mg) by mouth once daily.   meclizine (Antivert) 12.5 mg tablet  Self No No   Sig: Take 1 tablet (12.5 mg) by mouth 3 times a day as needed for dizziness.   metoprolol tartrate (Lopressor) 50 mg tablet  Self No No   Sig: Take 1 tablet (50 mg) by mouth 2 times a day. Take 50 mg in the morning and 25 mg in the evening   Patient taking differently: Take 1 tablet by mouth. Take 50 mg in the morning and 25 mg in the evening   multivitamin tablet  Self Yes No   Sig: Take 1 tablet by mouth once daily.   nitroglycerin  (Nitrostat) 0.4 mg SL tablet  Self No No   Sig: Place 1 tablet (0.4 mg) under the tongue every 5 minutes if needed for chest pain.   rosuvastatin (Crestor) 5 mg tablet  Self Yes No   Sig: Take 1 tablet (5 mg) by mouth once daily at bedtime.      Facility-Administered Medications: None        The list below reflects the updated allergy list. Please review each documented allergy for additional clarification and justification.  Allergies  Reviewed by Kishore May PA-C on 4/2/2024   No Known Allergies         Patient accepts M2B at discharge. Pharmacy has been updated to [facility].    Sources used to complete the med history include medication dispense history, care everywhere, OARRS, and patient interview. Patient was a good historian of medications and has written list of medications with her.    Below are additional concerns with the patient's PTA list.  - Dr. Thomas note from 1/22 instructed Jazzy to take losartan 50mg daily, but she has continued to use 25mg daily. He also instructed to use metoprolol 50mg in the morning and 25mg in the evening which Jazzy has been doing at home.    - Jazzy would normally be out of amlodipine, Flonase, and rosuvastatin based on medication fill history, but she reports that she has not run out of them because she was given fills of her medications the last time she was in a nursing facility and has extra medication bottles at home.    - Rarely uses meclizine at home last filled on 11/27.    Heladio Rangel PharmD  [facility] PGY1 Pharmacy Resident   [facility]  Please reach out via [employer] Secure Chat for questions, or if no response call [employer] or [employer] “MedRec”

## 2024-04-03 NOTE — PROGRESS NOTES
Explained role of TCC in discharge planning. Patient admitted for monitoring of dofetilide therapy. Anticipate discharge home with no needs on Friday, 4/5. Marycarmen Rao RN, TCC

## 2024-04-03 NOTE — PROGRESS NOTES
CARDIAC ELECTROPHYSIOLOGY NOTE  Subjective Data:  -Pt was seen/examined at 8:42AM and tele reviewed  -Denies CP, dyspnea, lightheadedness or palps; sitting up in chair  -states she's been up in room and to BR without issues  -1st dose Tikosyn 250mcg last night, remains in controlled AF    Objective Data:  Last Recorded Vitals:  Vitals:    04/02/24 1959 04/02/24 2329 04/03/24 0434 04/03/24 0826   BP: 147/87 127/83 128/70 119/77   BP Location: Right arm Right arm Right arm    Patient Position: Sitting Lying Lying Sitting   Pulse: 83 65 78 90   Resp: 20 20 20 18   Temp: 36.8 °C (98.3 °F) 36.4 °C (97.6 °F) 36.7 °C (98.1 °F) 36.3 °C (97.4 °F)   TempSrc: Temporal Temporal Temporal    SpO2: 98% 98% 98% 99%   Weight:   83.6 kg (184 lb 4.9 oz)    Height:           Last labs:  Results from last 7 days   Lab Units 04/02/24  1335   WBC AUTO x10*3/uL 5.2   HEMOGLOBIN g/dL 12.8   HEMATOCRIT % 42.5   PLATELETS AUTO x10*3/uL 249     Lab Units 04/03/24  0805 04/03/24 0443 04/02/24  1530 04/02/24  1335   SODIUM mmol/L  --  138  --  139   POTASSIUM mmol/L 4.4 (re-draw) 5.1 hemolyzed 3.8 5.6* hemolyzed   CHLORIDE mmol/L  --  106  --  105   CO2 mmol/L  --  23  --  25   BUN mg/dL  --  18  --  15   CREATININE mg/dL  --  0.83  --  0.95   GLUCOSE mg/dL  --  129*  --  73*   CALCIUM mg/dL  --  8.7  --  9.3     Results from last 7 days   Lab Units 04/03/24  0805 04/03/24 0443 04/02/24  1530   MAGNESIUM mg/dL 2.17 (re-draw) 2.35 hemolyzed 1.80     Tele reviewed: AF 60s-80s, rare PVC    EKG post 1st dose 4/2/24 23:23--AF 80s, QRS 82, no acute changes c/t prior, absolute QT 390ms (QTc inaccurate due to irreg R-R interval)    EKG post 2nd dose 4/3/24 11:38AM--AF 70s, QRS 90, absolute QT 420ms     Above EKGs were reviewed by Dr Hogue also    Inpatient Medications:  Scheduled:  amLODIPine, 5 mg, Daily  apixaban, 5 mg, q12h  aspirin, 81 mg, Daily  cholecalciferol, 5,000 Units, Daily  dofetilide, 250 mcg, q12h  furosemide, 20 mg, Daily  losartan,  25 mg, Daily  metoprolol tartrate, 25 mg, Nightly  metoprolol tartrate, 50 mg, q AM (HOLD 4/4 AM pre-DCCV)  rosuvastatin, 5 mg, Nightly    PRN:  acetaminophen, 650 mg, q4h PRN  fluticasone, 1 spray, Daily PRN  nitroglycerin, 0.4 mg, q5 min PRN    Physical Exam:  Gen-A+O x 3  Skin-W+D  Lungs-CTAB  CV-irreg irreg, no M/G/R  Abd-soft, NT/ND, +BS  Extrem-trace BLE edema (less than yesterday 4/2)  Neuro-CHEUNG  Psych-appropriate mood and affect    Assessment/Plan   79yo F with HTN, HLD, HFmEF (EF 45-50%), EVA (no CPAP), COPD(no O2/inhalers), CAD (mod LAD + diag dz), CVA 2022(was not on OAC then),  renal infarct 11/2023 iso 4-5 days of not taking Eliquis. and persistent AF/AFL (Eliquis) dx 12/2023 presenting for Tikosyn initiation. Pt missed one dose Eliquis Sat 3/30 (Dr Hogue is aware). CrCl 53.6ml/min thus Tikosyn 250mcg q12h started 4/2 PM.      Case was d/w Dr Hogue who reviewed EKGs and agrees w/plan as outlined. Pt requested dietician consult re: heart healthy diet (ordered)    1-Symptomatic persistent AF-->metop, Eliquis; Tikosyn initiation  2-Chronic HFmEF 45-50%-->on losartan 25, metop, lasix 20  3-HTN-->losartan 25, amlodipine 5, metop  4-HLD-->rosuva 5  5-CAD (mod LAD + D1 dz [facility] 2013)-->ASA 81; nuc stress 2022 prior apical infarct w/minimal carl-infarct ischemia o/w grossly NL perfusion  6-EVA-->does not use CPAP  7-COPD by prior CT-->no inhalers or O2; SpO2 high 90s on room air     -Cont Tikosyn 250mcg q12h  -Continuous tele / QTc monitoring  -EKG 2-3 hrs post each dose  -Replete lytes PRN (K>4, Mg>2)  -Cont Eliquis 5mg q12h  -Cont rest of home meds  -DCCV Thurs 4/4 if doesn't convert-->NPO past MN, hold AM metop pre-DCCV  -Pt was given a 3-page Tikosyn education guide which she read, we reviewed at length, and all questions answered     *EP fellow/HVI cards fellow cristóbal covers overnight 5:30p-7:30a--Pager [phone]; signout will be given.    Code Status:  Full Code    I spent >50 minutes in the  professional and overall care of this patient.    ELZA Mendoza, PAWaylonC, [facility]  Inpatient Cardiac Electrophysiology  Personal pager: [phone] (Tues-Fri)

## 2024-04-03 NOTE — CARE PLAN
The patient's goals for the shift include convert to nsr > tikosyn    The clinical goals for the shift include remain hds    Over the shift, the pt received her second dose of tikosyn and during the shift the pt converted to NSR; EKG done and confirmed and GWEN/Kishore made aware; continue to monitor.

## 2024-04-04 ENCOUNTER — APPOINTMENT (OUTPATIENT)
Dept: CARDIOLOGY | Facility: HOSPITAL | Age: 79
DRG: 309 | End: 2024-04-04
Payer: MEDICARE

## 2024-04-04 LAB
ANION GAP SERPL CALC-SCNC: 11 MMOL/L (ref 10–20)
BUN SERPL-MCNC: 20 MG/DL (ref 6–23)
CALCIUM SERPL-MCNC: 9 MG/DL (ref 8.6–10.6)
CHLORIDE SERPL-SCNC: 105 MMOL/L (ref 98–107)
CO2 SERPL-SCNC: 27 MMOL/L (ref 21–32)
CREAT SERPL-MCNC: 0.84 MG/DL (ref 0.5–1.05)
EGFRCR SERPLBLD CKD-EPI 2021: 72 ML/MIN/1.73M*2
GLUCOSE SERPL-MCNC: 93 MG/DL (ref 74–99)
MAGNESIUM SERPL-MCNC: 1.87 MG/DL (ref 1.6–2.4)
POTASSIUM SERPL-SCNC: 4.2 MMOL/L (ref 3.5–5.3)
SODIUM SERPL-SCNC: 139 MMOL/L (ref 136–145)

## 2024-04-04 PROCEDURE — 2500000004 HC RX 250 GENERAL PHARMACY W/ HCPCS (ALT 636 FOR OP/ED): Performed by: STUDENT IN AN ORGANIZED HEALTH CARE EDUCATION/TRAINING PROGRAM

## 2024-04-04 PROCEDURE — 93005 ELECTROCARDIOGRAM TRACING: CPT

## 2024-04-04 PROCEDURE — RXMED WILLOW AMBULATORY MEDICATION CHARGE

## 2024-04-04 PROCEDURE — 2500000001 HC RX 250 WO HCPCS SELF ADMINISTERED DRUGS (ALT 637 FOR MEDICARE OP): Performed by: PHYSICIAN ASSISTANT

## 2024-04-04 PROCEDURE — 80048 BASIC METABOLIC PNL TOTAL CA: CPT | Performed by: PHYSICIAN ASSISTANT

## 2024-04-04 PROCEDURE — 36415 COLL VENOUS BLD VENIPUNCTURE: CPT | Performed by: PHYSICIAN ASSISTANT

## 2024-04-04 PROCEDURE — 83735 ASSAY OF MAGNESIUM: CPT | Performed by: PHYSICIAN ASSISTANT

## 2024-04-04 PROCEDURE — 99232 SBSQ HOSP IP/OBS MODERATE 35: CPT | Performed by: PHYSICIAN ASSISTANT

## 2024-04-04 PROCEDURE — 1200000002 HC GENERAL ROOM WITH TELEMETRY DAILY

## 2024-04-04 PROCEDURE — 2500000002 HC RX 250 W HCPCS SELF ADMINISTERED DRUGS (ALT 637 FOR MEDICARE OP, ALT 636 FOR OP/ED): Performed by: PHYSICIAN ASSISTANT

## 2024-04-04 PROCEDURE — 93010 ELECTROCARDIOGRAM REPORT: CPT | Performed by: INTERNAL MEDICINE

## 2024-04-04 RX ORDER — METOPROLOL SUCCINATE 25 MG/1
25 TABLET, EXTENDED RELEASE ORAL DAILY
Status: DISCONTINUED | OUTPATIENT
Start: 2024-04-05 | End: 2024-04-05 | Stop reason: HOSPADM

## 2024-04-04 RX ORDER — METOPROLOL SUCCINATE 25 MG/1
25 TABLET, EXTENDED RELEASE ORAL EVERY MORNING
Qty: 90 TABLET | Refills: 0 | Status: SHIPPED | OUTPATIENT
Start: 2024-04-05

## 2024-04-04 RX ORDER — MAGNESIUM SULFATE HEPTAHYDRATE 40 MG/ML
4 INJECTION, SOLUTION INTRAVENOUS ONCE
Status: COMPLETED | OUTPATIENT
Start: 2024-04-04 | End: 2024-04-04

## 2024-04-04 RX ORDER — DOFETILIDE 0.25 MG/1
250 CAPSULE ORAL EVERY 12 HOURS
Qty: 60 CAPSULE | Refills: 0 | Status: SHIPPED | OUTPATIENT
Start: 2024-04-04 | End: 2024-05-02 | Stop reason: SDUPTHER

## 2024-04-04 RX ADMIN — CHOLECALCIFEROL TAB 125 MCG (5000 UNIT) 5000 UNITS: 125 TAB at 09:42

## 2024-04-04 RX ADMIN — ASPIRIN 81 MG: 81 TABLET, COATED ORAL at 09:42

## 2024-04-04 RX ADMIN — METOPROLOL TARTRATE 12.5 MG: 25 TABLET, FILM COATED ORAL at 09:42

## 2024-04-04 RX ADMIN — LOSARTAN POTASSIUM 25 MG: 25 TABLET, FILM COATED ORAL at 09:42

## 2024-04-04 RX ADMIN — FUROSEMIDE 20 MG: 20 TABLET ORAL at 09:42

## 2024-04-04 RX ADMIN — APIXABAN 5 MG: 5 TABLET, FILM COATED ORAL at 09:42

## 2024-04-04 RX ADMIN — APIXABAN 5 MG: 5 TABLET, FILM COATED ORAL at 21:00

## 2024-04-04 RX ADMIN — ROSUVASTATIN CALCIUM 5 MG: 5 TABLET, COATED ORAL at 21:00

## 2024-04-04 RX ADMIN — DOFETILIDE 250 MCG: 250 CAPSULE ORAL at 09:42

## 2024-04-04 RX ADMIN — AMLODIPINE BESYLATE 5 MG: 5 TABLET ORAL at 20:59

## 2024-04-04 RX ADMIN — MAGNESIUM SULFATE HEPTAHYDRATE 4 G: 40 INJECTION, SOLUTION INTRAVENOUS at 06:47

## 2024-04-04 RX ADMIN — DOFETILIDE 250 MCG: 250 CAPSULE ORAL at 21:00

## 2024-04-04 RX ADMIN — METOPROLOL TARTRATE 12.5 MG: 25 TABLET, FILM COATED ORAL at 21:00

## 2024-04-04 SDOH — ECONOMIC STABILITY: INCOME INSECURITY: IN THE PAST 12 MONTHS, HAS THE ELECTRIC, GAS, OIL, OR WATER COMPANY THREATENED TO SHUT OFF SERVICE IN YOUR HOME?: NO

## 2024-04-04 SDOH — SOCIAL STABILITY: SOCIAL INSECURITY: WITHIN THE LAST YEAR, HAVE YOU BEEN AFRAID OF YOUR PARTNER OR EX-PARTNER?: NO

## 2024-04-04 SDOH — ECONOMIC STABILITY: HOUSING INSECURITY
IN THE LAST 12 MONTHS, WAS THERE A TIME WHEN YOU DID NOT HAVE A STEADY PLACE TO SLEEP OR SLEPT IN A SHELTER (INCLUDING NOW)?: NO

## 2024-04-04 SDOH — SOCIAL STABILITY: SOCIAL INSECURITY
WITHIN THE LAST YEAR, HAVE TO BEEN RAPED OR FORCED TO HAVE ANY KIND OF SEXUAL ACTIVITY BY YOUR PARTNER OR EX-PARTNER?: NO

## 2024-04-04 SDOH — HEALTH STABILITY: PHYSICAL HEALTH: ON AVERAGE, HOW MANY DAYS PER WEEK DO YOU ENGAGE IN MODERATE TO STRENUOUS EXERCISE (LIKE A BRISK WALK)?: 0 DAYS

## 2024-04-04 SDOH — HEALTH STABILITY: MENTAL HEALTH
STRESS IS WHEN SOMEONE FEELS TENSE, NERVOUS, ANXIOUS, OR CAN'T SLEEP AT NIGHT BECAUSE THEIR MIND IS TROUBLED. HOW STRESSED ARE YOU?: NOT AT ALL

## 2024-04-04 SDOH — ECONOMIC STABILITY: INCOME INSECURITY: HOW HARD IS IT FOR YOU TO PAY FOR THE VERY BASICS LIKE FOOD, HOUSING, MEDICAL CARE, AND HEATING?: NOT VERY HARD

## 2024-04-04 SDOH — ECONOMIC STABILITY: FOOD INSECURITY: WITHIN THE PAST 12 MONTHS, THE FOOD YOU BOUGHT JUST DIDN'T LAST AND YOU DIDN'T HAVE MONEY TO GET MORE.: NEVER TRUE

## 2024-04-04 SDOH — HEALTH STABILITY: PHYSICAL HEALTH: ON AVERAGE, HOW MANY MINUTES DO YOU ENGAGE IN EXERCISE AT THIS LEVEL?: 0 MIN

## 2024-04-04 SDOH — ECONOMIC STABILITY: FOOD INSECURITY: WITHIN THE PAST 12 MONTHS, YOU WORRIED THAT YOUR FOOD WOULD RUN OUT BEFORE YOU GOT MONEY TO BUY MORE.: NEVER TRUE

## 2024-04-04 SDOH — SOCIAL STABILITY: SOCIAL INSECURITY
WITHIN THE LAST YEAR, HAVE YOU BEEN KICKED, HIT, SLAPPED, OR OTHERWISE PHYSICALLY HURT BY YOUR PARTNER OR EX-PARTNER?: NO

## 2024-04-04 SDOH — SOCIAL STABILITY: SOCIAL INSECURITY: WITHIN THE LAST YEAR, HAVE YOU BEEN HUMILIATED OR EMOTIONALLY ABUSED IN OTHER WAYS BY YOUR PARTNER OR EX-PARTNER?: NO

## 2024-04-04 SDOH — ECONOMIC STABILITY: INCOME INSECURITY: IN THE LAST 12 MONTHS, WAS THERE A TIME WHEN YOU WERE NOT ABLE TO PAY THE MORTGAGE OR RENT ON TIME?: NO

## 2024-04-04 SDOH — HEALTH STABILITY: MENTAL HEALTH: HOW OFTEN DO YOU HAVE A DRINK CONTAINING ALCOHOL?: NEVER

## 2024-04-04 SDOH — HEALTH STABILITY: MENTAL HEALTH: HOW OFTEN DO YOU HAVE 6 OR MORE DRINKS ON ONE OCCASION?: NEVER

## 2024-04-04 SDOH — ECONOMIC STABILITY: HOUSING INSECURITY: IN THE LAST 12 MONTHS, HOW MANY PLACES HAVE YOU LIVED?: 1

## 2024-04-04 SDOH — HEALTH STABILITY: MENTAL HEALTH: HOW MANY STANDARD DRINKS CONTAINING ALCOHOL DO YOU HAVE ON A TYPICAL DAY?: PATIENT DOES NOT DRINK

## 2024-04-04 SDOH — ECONOMIC STABILITY: TRANSPORTATION INSECURITY
IN THE PAST 12 MONTHS, HAS LACK OF TRANSPORTATION KEPT YOU FROM MEETINGS, WORK, OR FROM GETTING THINGS NEEDED FOR DAILY LIVING?: NO

## 2024-04-04 SDOH — ECONOMIC STABILITY: TRANSPORTATION INSECURITY
IN THE PAST 12 MONTHS, HAS THE LACK OF TRANSPORTATION KEPT YOU FROM MEDICAL APPOINTMENTS OR FROM GETTING MEDICATIONS?: NO

## 2024-04-04 ASSESSMENT — COGNITIVE AND FUNCTIONAL STATUS - GENERAL
MOBILITY SCORE: 22
MOBILITY SCORE: 22
CLIMB 3 TO 5 STEPS WITH RAILING: A LOT
DAILY ACTIVITIY SCORE: 24
CLIMB 3 TO 5 STEPS WITH RAILING: A LOT
DAILY ACTIVITIY SCORE: 24

## 2024-04-04 ASSESSMENT — LIFESTYLE VARIABLES
SKIP TO QUESTIONS 9-10: 1
AUDIT-C TOTAL SCORE: 0

## 2024-04-04 ASSESSMENT — ACTIVITIES OF DAILY LIVING (ADL)
LACK_OF_TRANSPORTATION: NO
LACK_OF_TRANSPORTATION: NO

## 2024-04-04 ASSESSMENT — PAIN SCALES - GENERAL
PAINLEVEL_OUTOF10: 0 - NO PAIN
PAINLEVEL_OUTOF10: 0 - NO PAIN

## 2024-04-04 ASSESSMENT — PAIN - FUNCTIONAL ASSESSMENT: PAIN_FUNCTIONAL_ASSESSMENT: 0-10

## 2024-04-04 NOTE — PROGRESS NOTES
04/04/24 1047   Discharge Planning   Living Arrangements Family members   Support Systems Family members   Assistance Needed none   Type of Residence Private residence   Number of Stairs to Enter Residence 6   Number of Stairs Within Residence 0   Do you have animals or pets at home? No   Who is requesting discharge planning? Provider   Home or Post Acute Services None   Patient expects to be discharged to: home   Does the patient need discharge transport arranged? No   Financial Resource Strain   How hard is it for you to pay for the very basics like food, housing, medical care, and heating? Not very   Housing Stability   In the last 12 months, was there a time when you were not able to pay the mortgage or rent on time? N   In the last 12 months, how many places have you lived? 1   In the last 12 months, was there a time when you did not have a steady place to sleep or slept in a shelter (including now)? N   Transportation Needs   In the past 12 months, has lack of transportation kept you from medical appointments or from getting medications? no   In the past 12 months, has lack of transportation kept you from meetings, work, or from getting things needed for daily living? No   Patient Choice   Patient / Family choosing to utilize agency / facility established prior to hospitalization No

## 2024-04-04 NOTE — CONSULTS
"Nutrition Diet Education  Provider consult order   Met with Pt at bedside for nutrition education. Pt had questions on \"what a cardiac diet is\" . Pt usually eats lean meats and uses salt free seasonings at home, Pt also loves vegetables . RDN educated Pt on heart healthy diet, DASH diet indications and low sodium cooking tips. AND handouts provided. No other nutrition related questions or concerns expressed at this time.   Education Documentation  Nutrition Care Manual, taught by Radha Tucker RDN, BALJIT at 4/4/2024 11:11 AM.  Learner: Patient  Readiness: Acceptance  Method: Explanation, Handout  Response: Verbalizes Understanding  Comment: Heart Healthy diet, low sodium cooking tips and DASH diet handout provided          Follow Up:     Time Spent (min): 45 minutes  Last Date of Nutrition Visit: 04/04/24  Nutrition Follow-Up Needed?: None  Follow up Comment: Education provided    "

## 2024-04-04 NOTE — PROGRESS NOTES
04/04/24 1049   Physical Activity   On average, how many days per week do you engage in moderate to strenuous exercise (like a brisk walk)? 0 days   On average, how many minutes do you engage in exercise at this level? 0 min   Financial Resource Strain   How hard is it for you to pay for the very basics like food, housing, medical care, and heating? Not very   Housing Stability   In the last 12 months, was there a time when you were not able to pay the mortgage or rent on time? N   In the last 12 months, how many places have you lived? 1   In the last 12 months, was there a time when you did not have a steady place to sleep or slept in a shelter (including now)? N   Transportation Needs   In the past 12 months, has lack of transportation kept you from medical appointments or from getting medications? no   In the past 12 months, has lack of transportation kept you from meetings, work, or from getting things needed for daily living? No   Food Insecurity   Within the past 12 months, you worried that your food would run out before you got the money to buy more. Never true   Within the past 12 months, the food you bought just didn't last and you didn't have money to get more. Never true   Stress   Do you feel stress - tense, restless, nervous, or anxious, or unable to sleep at night because your mind is troubled all the time - these days? Not at all   Intimate Partner Violence   Within the last year, have you been afraid of your partner or ex-partner? No   Within the last year, have you been humiliated or emotionally abused in other ways by your partner or ex-partner? No   Within the last year, have you been kicked, hit, slapped, or otherwise physically hurt by your partner or ex-partner? No   Within the last year, have you been raped or forced to have any kind of sexual activity by your partner or ex-partner? No   Alcohol Use   Q1: How often do you have a drink containing alcohol? Never   Q2: How many drinks  containing alcohol do you have on a typical day when you are drinking? None   Q3: How often do you have six or more drinks on one occasion? Never   Utilities   In the past 12 months has the electric, gas, oil, or water company threatened to shut off services in your home? No

## 2024-04-04 NOTE — PROGRESS NOTES
CARDIAC ELECTROPHYSIOLOGY NOTE  Subjective Data:  -Pt was seen/examined at 8:45AM and tele reviewed  -Denies CP, dyspnea, lightheadedness or palps  -states she walked in amado yesterday and this morning and felt well    Objective Data:  Last Recorded Vitals:  Vitals:    04/03/24 2002 04/03/24 2335 04/04/24 0524 04/04/24 0828   BP: 123/78 123/74 120/73 131/81   BP Location:    Right arm   Patient Position:    Lying   Pulse: 67 65 63 65   Resp: 18 18 18 18   Temp: 36.6 °C (97.8 °F) 36.3 °C (97.4 °F) 36.4 °C (97.6 °F) 35.9 °C (96.7 °F)   TempSrc: Temporal Temporal Temporal Temporal   SpO2: 99% 97% 95% 97%   Weight:   84.1 kg (185 lb 6.5 oz)    Height:           Last labs:  Results from last 7 days   Lab Units 04/02/24  1335   WBC AUTO x10*3/uL 5.2   HEMOGLOBIN g/dL 12.8   HEMATOCRIT % 42.5   PLATELETS AUTO x10*3/uL 249     Results from last 7 days   Lab Units 04/04/24  0511 04/03/24  0805 04/03/24  0443 04/02/24  1530 04/02/24  1335   SODIUM mmol/L 139  --  138  --  139   POTASSIUM mmol/L 4.2 4.4 5.1 hemolyzed 3.8 5.6*hemolyzed   CHLORIDE mmol/L 105  --  106  --  105   CO2 mmol/L 27  --  23  --  25   BUN mg/dL 20  --  18  --  15   CREATININE mg/dL 0.84  --  0.83  --  0.95   GLUCOSE mg/dL 93  --  129*  --  73*   CALCIUM mg/dL 9.0  --  8.7  --  9.3    < > = values in this interval not displayed.     Results from last 7 days   Lab Units 04/04/24 0511 04/03/24  0805 04/03/24  0443   MAGNESIUM mg/dL 1.87 2.17 2.35 hemolyzed     Tele reviewed: converted to SR 60s 4/3 afternoon; SB 50s at times overnight asleep; afternoon tele with SB/SR 58-60s    EKG post 3rd dose 4/3/24 23:32--SR 65, , QRS 86, QT 450ms/QTc 469ms (hand measured, excluding distinct U waves    EKG post 4th dose 4/4/24 12:09pm--SB 58, , QRS 84, QT 470ms excluding distinct U waves. QTc by formulas other than [place] due to HR<60:  -[place]: QTc 465ms  -[place]: QTc 465ms  -[place]: QTc 467ms    Above EKGs were reviewed by Dr Hogue remotely  also    Inpatient Medications:  Scheduled:  amLODIPine, 5 mg, Daily  apixaban, 5 mg, BID  aspirin, 81 mg, Daily  cholecalciferol, 5,000 Units, Daily  dofetilide, 250 mcg, q12h  furosemide, 20 mg, Daily  losartan, 25 mg, Daily  magnesium sulfate, 4 g, Once  metoprolol tartrate, 12.5 mg, BID  rosuvastatin, 5 mg, Nightly    PRN:  acetaminophen, 650 mg, q4h PRN  fluticasone, 1 spray, Daily PRN  nitroglycerin, 0.4 mg, q5 min PRN      Physical Exam:  Gen-A+O x 3  Skin-W+D  Lungs-CTAB  CV-RRR, no M/G/R  Abd-obese, soft, NT/ND, +BS  Extrem-trace BLE edema  Neuro-CHEUNG  Psych-appropriate mood and affect    Assessment/Plan   77yo F with HTN, HLD, HFmEF (EF 45-50%), EVA (no CPAP), COPD(no O2/inhalers), CAD (mod LAD + diag dz), CVA 2022(was not on OAC then),  renal infarct 11/2023 iso 4-5 days of not taking Eliquis. and persistent AF/AFL (Eliquis) dx 12/2023 presenting for Tikosyn initiation. Pt missed one dose Eliquis Sat 3/30 (Dr Hogue is aware). CrCl 53.6ml/min thus Tikosyn 250mcg q12h started 4/2 PM. Converted to NSR post 2nd dose 4/3.     Case was d/w Dr Hogue who reviewed EKGs remotely and agrees w/plan as outlined. Home dose metop will likely be succinate 25 qAM (was on tartrate 50 AM/25 PM)     1-Symptomatic persistent AF-->metop, Eliquis; Tikosyn initiation  2-Chronic HFmEF 45-50%-->on losartan 25, metop, lasix 20  3-HTN-->losartan 25, amlodipine 5, metop  4-HLD-->rosuva 5  5-CAD (mod LAD + D1 dz [facility] 2013)-->ASA 81; nuc stress 2022 prior apical infarct w/minimal carl-infarct ischemia o/w grossly NL perfusion  6-EVA-->does not use CPAP  7-COPD by prior CT-->no inhalers or O2; SpO2 high 90s on room air     -Cont Tikosyn 250mcg q12h  -Continuous tele / QTc monitoring  -EKG 2-3 hrs post each dose  -Replete lytes PRN (K>4, Mg>2)-->Mg 1.87 repleted  -Cont Eliquis 5mg q12h  -Cont rest of home meds except metop dose decreased 4/3 after conversion to SR (HR 60s)  -Anticipate dc home Fri 4/5 if QTc stable post 6th dose  -EP  F/u 1 month Netta Pimentel, CNP 5/13/24 in [place] 1800     *EP fellow/HVI cards fellow cristóbal covers overnight 5:30p-7:30a--Pager [phone]; signout will be given.    Code Status:  Full Code    I spent >35 minutes in the professional and overall care of this patient.    ELZA Mendoza, PAWaylonC, [facility]  Inpatient Cardiac Electrophysiology  Personal pager: [phone] (Tues-Fri)

## 2024-04-05 ENCOUNTER — PHARMACY VISIT (OUTPATIENT)
Dept: PHARMACY | Facility: CLINIC | Age: 79
End: 2024-04-05
Payer: MEDICARE

## 2024-04-05 ENCOUNTER — APPOINTMENT (OUTPATIENT)
Dept: CARDIOLOGY | Facility: HOSPITAL | Age: 79
DRG: 309 | End: 2024-04-05
Payer: MEDICARE

## 2024-04-05 VITALS
BODY MASS INDEX: 30.89 KG/M2 | HEART RATE: 72 BPM | DIASTOLIC BLOOD PRESSURE: 80 MMHG | SYSTOLIC BLOOD PRESSURE: 146 MMHG | OXYGEN SATURATION: 99 % | WEIGHT: 185.41 LBS | RESPIRATION RATE: 18 BRPM | TEMPERATURE: 98.6 F | HEIGHT: 65 IN

## 2024-04-05 LAB
ANION GAP SERPL CALC-SCNC: 12 MMOL/L (ref 10–20)
ATRIAL RATE: 63 BPM
ATRIAL RATE: 63 BPM
ATRIAL RATE: 64 BPM
BUN SERPL-MCNC: 19 MG/DL (ref 6–23)
CALCIUM SERPL-MCNC: 9.2 MG/DL (ref 8.6–10.6)
CHLORIDE SERPL-SCNC: 105 MMOL/L (ref 98–107)
CO2 SERPL-SCNC: 27 MMOL/L (ref 21–32)
CREAT SERPL-MCNC: 0.84 MG/DL (ref 0.5–1.05)
EGFRCR SERPLBLD CKD-EPI 2021: 72 ML/MIN/1.73M*2
GLUCOSE SERPL-MCNC: 84 MG/DL (ref 74–99)
MAGNESIUM SERPL-MCNC: 2.03 MG/DL (ref 1.6–2.4)
P AXIS: 64 DEGREES
P AXIS: 66 DEGREES
P AXIS: 68 DEGREES
P OFFSET: 211 MS
P OFFSET: 212 MS
P OFFSET: 218 MS
P ONSET: 139 MS
P ONSET: 140 MS
P ONSET: 140 MS
POTASSIUM SERPL-SCNC: 4.4 MMOL/L (ref 3.5–5.3)
PR INTERVAL: 160 MS
Q ONSET: 219 MS
Q ONSET: 220 MS
Q ONSET: 220 MS
QRS COUNT: 10 BEATS
QRS DURATION: 86 MS
QRS DURATION: 88 MS
QRS DURATION: 90 MS
QT INTERVAL: 452 MS
QT INTERVAL: 468 MS
QT INTERVAL: 474 MS
QTC CALCULATION(BAZETT): 462 MS
QTC CALCULATION(BAZETT): 478 MS
QTC CALCULATION(BAZETT): 489 MS
QTC FREDERICIA: 459 MS
QTC FREDERICIA: 475 MS
QTC FREDERICIA: 484 MS
R AXIS: 49 DEGREES
R AXIS: 66 DEGREES
R AXIS: 67 DEGREES
SODIUM SERPL-SCNC: 140 MMOL/L (ref 136–145)
T AXIS: 50 DEGREES
T AXIS: 59 DEGREES
T AXIS: 70 DEGREES
T OFFSET: 446 MS
T OFFSET: 454 MS
T OFFSET: 456 MS
VENTRICULAR RATE: 63 BPM
VENTRICULAR RATE: 63 BPM
VENTRICULAR RATE: 64 BPM

## 2024-04-05 PROCEDURE — 93005 ELECTROCARDIOGRAM TRACING: CPT

## 2024-04-05 PROCEDURE — 82374 ASSAY BLOOD CARBON DIOXIDE: CPT | Performed by: PHYSICIAN ASSISTANT

## 2024-04-05 PROCEDURE — 36415 COLL VENOUS BLD VENIPUNCTURE: CPT | Performed by: PHYSICIAN ASSISTANT

## 2024-04-05 PROCEDURE — 99239 HOSP IP/OBS DSCHRG MGMT >30: CPT | Performed by: PHYSICIAN ASSISTANT

## 2024-04-05 PROCEDURE — 2500000001 HC RX 250 WO HCPCS SELF ADMINISTERED DRUGS (ALT 637 FOR MEDICARE OP): Performed by: PHYSICIAN ASSISTANT

## 2024-04-05 PROCEDURE — 83735 ASSAY OF MAGNESIUM: CPT | Performed by: PHYSICIAN ASSISTANT

## 2024-04-05 RX ORDER — CALCIUM CARBONATE 300MG(750)
400 TABLET,CHEWABLE ORAL DAILY
Qty: 90 TABLET | Refills: 1 | Status: SHIPPED | OUTPATIENT
Start: 2024-04-05

## 2024-04-05 RX ORDER — DOFETILIDE 0.25 MG/1
250 CAPSULE ORAL EVERY 12 HOURS
Qty: 180 CAPSULE | Refills: 0 | Status: SHIPPED | OUTPATIENT
Start: 2024-04-05 | End: 2024-04-09 | Stop reason: SDUPTHER

## 2024-04-05 RX ADMIN — LOSARTAN POTASSIUM 25 MG: 25 TABLET, FILM COATED ORAL at 09:06

## 2024-04-05 RX ADMIN — METOPROLOL SUCCINATE 25 MG: 25 TABLET, FILM COATED, EXTENDED RELEASE ORAL at 09:06

## 2024-04-05 RX ADMIN — APIXABAN 5 MG: 5 TABLET, FILM COATED ORAL at 09:06

## 2024-04-05 RX ADMIN — FUROSEMIDE 20 MG: 20 TABLET ORAL at 09:06

## 2024-04-05 RX ADMIN — DOFETILIDE 250 MCG: 250 CAPSULE ORAL at 09:06

## 2024-04-05 RX ADMIN — CHOLECALCIFEROL TAB 125 MCG (5000 UNIT) 5000 UNITS: 125 TAB at 09:06

## 2024-04-05 RX ADMIN — ASPIRIN 81 MG: 81 TABLET, COATED ORAL at 09:06

## 2024-04-05 NOTE — CARE PLAN
The patient's goals for the shift include remain in nsr    The clinical goals for the shift include Patient will stay in SR during shift      Problem: Pain  Goal: My pain/discomfort is manageable  Outcome: Progressing     Problem: Safety  Goal: Patient will be injury free during hospitalization  Outcome: Progressing  Goal: I will remain free of falls  Outcome: Progressing

## 2024-04-05 NOTE — DISCHARGE SUMMARY
Discharge Diagnosis  Symptomatic persistent AFib, Tikosyn initiation    Test Results Pending At Discharge: none     Hospital Course  77yo F with HTN, HLD, HFmEF (EF 45-50%), EVA (no CPAP), COPD(no O2/inhalers), CAD (mod LAD + diag dz), CVA , PMR per EMR(no steroids), and symptomatic persistent AF/AFL(Eliquis) dx 2023 presenting for Tikosyn initiation. Pt had renal infarct 2023 iso 4-5 days of not taking Eliquis. Had successful DCCV 24 but was in AFL by EKG 3/11/24. Pt was referred to Dr Hogue who saw pt 3/21/24 and options were discussed including AADs vs ablation. Pt does have severely dilated LA making ablation less likely to be successful. Pt preferred AAD and Dr Hogue recommended Tikosyn. Amio would be a less than ideal choice given radiation fibrosis and COPD by CT scan 2022. Pt stated she missed 1 dose of Eliquis Sat 3/30 when she went to a ; Dr Hogue aware of this.    Pt arrived from home in controlled AF. Pt was admitted to [place] [address], started on Tikosyn 250mcg every 12 hours (CrCl 53.6ml/min) and was maintained on continuous telemetry.  EKGs were obtained two hours after each dose of Tikosyn for QTc monitoring.  Magnesium and potassium levels were monitored throughout the hospitalization and were repleted as necessary. Pt converted to sinus rhythm (SR) 60 after 2nd Tikosyn dose. Metoprolol was decreased. QTc remained stable through 6 doses of Tikosyn. Telemetry remained benign; sinus anais 50s noted overnight while asleep (likely related to untreated EVA). Labs were remarkable only for Mag 1.8 which was repleted. Low dose mag oxide was added to regimen. Pt ambulated in amado without any chest discomfort or other issues. Pt requested dietician consult for heart healthy diet teaching which was completed.     Tikosyn education guide was given to and reviewed w/ pt. Pt is aware of importance of informing all providers of current accurate medication list due to potential drug-drug  interactions, and not to begin new Rx or OTC meds without first discussing with provider or pharmacist.    After review of case and EKGs with Dr Hogue on day of discharge, pt was deemed stable and appropriate for discharge. Upon discharge, a 30-day dofetilide (Tikosyn) supply from the  outpatient pharmacy was provided to pt. Another prescription was sent to [facility] per pt request (90-day supply, no RF); pt is aware she needs to make an account at [facility] [employer] and website was given to pt.  Pt was discharged in satisfactory condition and will F/u with GWEN Rojas NP on 5/13/24 in [place] 1800 with EKG.    *Pt will need BMP drawn every 3-6 months with PCP or primary cardiologist to evaluate renal function while on Tikosyn.    Pertinent Physical Exam At Time of Discharge  Gen-A+O x 3  Skin-W+D  Lungs-CTAB  CV-RRR, no m/g/r  Abd-soft, NT/ND, +BS  Extrem-trace BLE edema  Neuro-CHEUNG  Psych-appropriate mood and affect    Home Medications  Medication List      START taking these medications     dofetilide 250 mcg capsule; Commonly known as: Tikosyn; Take 1 capsule (250 mcg) by mouth every 12 hours.   magnesium oxide 400 mg tablet; Commonly known as: Mag-Ox; Take 1 tablet (400 mg) by mouth once daily.   metoprolol succinate XL 25 mg 24 hr tablet; Commonly known as:   Toprol-XL; Take 1 tablet (25 mg) by mouth once daily in the morning.     CONTINUE taking these medications     amLODIPine 5 mg tablet; Commonly known as: Norvasc   aspirin 81 mg EC tablet   cholecalciferol 5,000 Units tablet; Commonly known as: Vitamin D-3   cyanocobalamin 1,000 mcg tablet; Commonly known as: Vitamin B-12   Eliquis 5 mg tablet; Generic drug: apixaban   fluticasone 50 mcg/actuation nasal spray; Commonly known as: Flonase   furosemide 20 mg tablet; Commonly known as: Lasix; Take 1 tablet (20 mg) by mouth once daily.   losartan 25 mg tablet; Commonly known as: Cozaar   multivitamin tablet   nitroglycerin 0.4 mg SL  tablet; Commonly known as: Nitrostat; Place 1 tablet (0.4 mg) under the tongue every 5 minutes if needed for chest pain.   rosuvastatin 5 mg tablet; Commonly known as: Crestor     STOP taking these medications     meclizine 12.5 mg tablet; Commonly known as: Antivert   metoprolol tartrate 50 mg tablet; Commonly known as: Lopressor    Outpatient Follow-Up  Future Appointments   Date Time Provider Department Center   4/9/2024  8:40 AM Christopher D'Amico, DO [id number] [facility]   4/11/2024 10:00 AM PHARMACY [facility] [id number] [facility]   5/13/2024  2:00 PM Netta Pimentel APRN-CNP [id number] [facility]   6/10/2024  8:30 AM Randy Thomas MD [id number] [facility]   8/8/2024  8:00 AM [facility] BESSY 2 CMCAHUMAM AHU [facility]   8/8/2024  9:00 AM Staci Buchanan APRN-CNP [id number] [facility]     Greater than 30 min were spent on coordination of discharge    ELZA Mendoza, PA-C, [facility]  Inpatient Cardiac Electrophysiology

## 2024-04-08 ENCOUNTER — PATIENT OUTREACH (OUTPATIENT)
Dept: PRIMARY CARE | Facility: CLINIC | Age: 79
End: 2024-04-08
Payer: MEDICARE

## 2024-04-08 NOTE — PROGRESS NOTES
Discharge Facility: [facility]  Discharge Diagnosis: Drug Load  Admission Date: 2 Apr 24  Discharge Date: 5 Apr 24    PCP Appointment Date: 9 Apr 24  Specialist Appointment Date: 13 May 24 (Cardiology, [place])   Hospital Encounter and Summary: Linked    No contact on discharge outreach. Message left. Follow up made for within 2 days of discharge. Will attempt outreach again after PCP appt.

## 2024-04-09 ENCOUNTER — HOSPITAL ENCOUNTER (OUTPATIENT)
Dept: RADIOLOGY | Facility: CLINIC | Age: 79
Discharge: HOME | End: 2024-04-09
Payer: MEDICARE

## 2024-04-09 ENCOUNTER — OFFICE VISIT (OUTPATIENT)
Dept: PRIMARY CARE | Facility: CLINIC | Age: 79
End: 2024-04-09
Payer: MEDICARE

## 2024-04-09 VITALS
OXYGEN SATURATION: 94 % | SYSTOLIC BLOOD PRESSURE: 138 MMHG | BODY MASS INDEX: 29.99 KG/M2 | DIASTOLIC BLOOD PRESSURE: 81 MMHG | WEIGHT: 180 LBS | HEIGHT: 65 IN | HEART RATE: 65 BPM

## 2024-04-09 DIAGNOSIS — I48.91 ATRIAL FIBRILLATION, UNSPECIFIED TYPE (MULTI): Primary | ICD-10-CM

## 2024-04-09 DIAGNOSIS — I10 ESSENTIAL HYPERTENSION: ICD-10-CM

## 2024-04-09 DIAGNOSIS — I48.92 ATRIAL FLUTTER, UNSPECIFIED TYPE (MULTI): ICD-10-CM

## 2024-04-09 DIAGNOSIS — M25.552 LEFT HIP PAIN: ICD-10-CM

## 2024-04-09 PROBLEM — K64.4 RESIDUAL HEMORRHOIDAL SKIN TAGS: Status: ACTIVE | Noted: 2024-04-09

## 2024-04-09 PROBLEM — K44.9 DIAPHRAGMATIC HERNIA: Status: ACTIVE | Noted: 2024-04-09

## 2024-04-09 PROCEDURE — 1111F DSCHRG MED/CURRENT MED MERGE: CPT | Performed by: STUDENT IN AN ORGANIZED HEALTH CARE EDUCATION/TRAINING PROGRAM

## 2024-04-09 PROCEDURE — 1160F RVW MEDS BY RX/DR IN RCRD: CPT | Performed by: STUDENT IN AN ORGANIZED HEALTH CARE EDUCATION/TRAINING PROGRAM

## 2024-04-09 PROCEDURE — 3079F DIAST BP 80-89 MM HG: CPT | Performed by: STUDENT IN AN ORGANIZED HEALTH CARE EDUCATION/TRAINING PROGRAM

## 2024-04-09 PROCEDURE — 1124F ACP DISCUSS-NO DSCNMKR DOCD: CPT | Performed by: STUDENT IN AN ORGANIZED HEALTH CARE EDUCATION/TRAINING PROGRAM

## 2024-04-09 PROCEDURE — 1170F FXNL STATUS ASSESSED: CPT | Performed by: STUDENT IN AN ORGANIZED HEALTH CARE EDUCATION/TRAINING PROGRAM

## 2024-04-09 PROCEDURE — 1159F MED LIST DOCD IN RCRD: CPT | Performed by: STUDENT IN AN ORGANIZED HEALTH CARE EDUCATION/TRAINING PROGRAM

## 2024-04-09 PROCEDURE — 73502 X-RAY EXAM HIP UNI 2-3 VIEWS: CPT | Mod: LT

## 2024-04-09 PROCEDURE — 3075F SYST BP GE 130 - 139MM HG: CPT | Performed by: STUDENT IN AN ORGANIZED HEALTH CARE EDUCATION/TRAINING PROGRAM

## 2024-04-09 PROCEDURE — 99495 TRANSJ CARE MGMT MOD F2F 14D: CPT | Performed by: STUDENT IN AN ORGANIZED HEALTH CARE EDUCATION/TRAINING PROGRAM

## 2024-04-09 PROCEDURE — 73502 X-RAY EXAM HIP UNI 2-3 VIEWS: CPT | Mod: LEFT SIDE | Performed by: STUDENT IN AN ORGANIZED HEALTH CARE EDUCATION/TRAINING PROGRAM

## 2024-04-09 PROCEDURE — 1036F TOBACCO NON-USER: CPT | Performed by: STUDENT IN AN ORGANIZED HEALTH CARE EDUCATION/TRAINING PROGRAM

## 2024-04-09 RX ORDER — AMLODIPINE BESYLATE 5 MG/1
5 TABLET ORAL DAILY
Qty: 90 TABLET | Refills: 3 | Status: SHIPPED | OUTPATIENT
Start: 2024-04-09 | End: 2025-04-09

## 2024-04-09 ASSESSMENT — PATIENT HEALTH QUESTIONNAIRE - PHQ9
1. LITTLE INTEREST OR PLEASURE IN DOING THINGS: NOT AT ALL
2. FEELING DOWN, DEPRESSED OR HOPELESS: NOT AT ALL
SUM OF ALL RESPONSES TO PHQ9 QUESTIONS 1 AND 2: 0

## 2024-04-09 ASSESSMENT — ACTIVITIES OF DAILY LIVING (ADL)
MANAGING_FINANCES: INDEPENDENT
DOING_HOUSEWORK: INDEPENDENT
TAKING_MEDICATION: INDEPENDENT
DRESSING: INDEPENDENT
BATHING: INDEPENDENT
GROCERY_SHOPPING: INDEPENDENT

## 2024-04-09 ASSESSMENT — ENCOUNTER SYMPTOMS
LOSS OF SENSATION IN FEET: 0
DEPRESSION: 0
OCCASIONAL FEELINGS OF UNSTEADINESS: 0

## 2024-04-09 NOTE — PROGRESS NOTES
"78-year-old female presenting to establish care, hospital follow-up.  Admitted 2024, discharged 2024.    \"Hospital Course  77yo F with HTN, HLD, HFmEF (EF 45-50%), EVA (no CPAP), COPD(no O2/inhalers), CAD (mod LAD + diag dz), CVA , PMR per EMR(no steroids), and symptomatic persistent AF/AFL(Eliquis) dx 2023 presenting for Tikosyn initiation. Pt had renal infarct 2023 iso 4-5 days of not taking Eliquis. Had successful DCCV 24 but was in AFL by EKG 3/11/24. Pt was referred to Dr Hogue who saw pt 3/21/24 and options were discussed including AADs vs ablation. Pt does have severely dilated LA making ablation less likely to be successful. Pt preferred AAD and Dr Hogue recommended Tikosyn. Amio would be a less than ideal choice given radiation fibrosis and COPD by CT scan 2022. Pt stated she missed 1 dose of Eliquis Sat 3/30 when she went to a ; Dr Hogue aware of this.     Pt arrived from home in controlled AF. Pt was admitted to [place] [id number], started on Tikosyn 250mcg every 12 hours (CrCl 53.6ml/min) and was maintained on continuous telemetry.  EKGs were obtained two hours after each dose of Tikosyn for QTc monitoring.  Magnesium and potassium levels were monitored throughout the hospitalization and were repleted as necessary. Pt converted to sinus rhythm (SR) 60 after 2nd Tikosyn dose. Metoprolol was decreased. QTc remained stable through 6 doses of Tikosyn. Telemetry remained benign; sinus anais 50s noted overnight while asleep (likely related to untreated EVA). Labs were remarkable only for Mag 1.8 which was repleted. Low dose mag oxide was added to regimen. Pt ambulated in amado without any chest discomfort or other issues. Pt requested dietician consult for heart healthy diet teaching which was completed.      Tikosyn education guide was given to and reviewed w/ pt. Pt is aware of importance of informing all providers of current accurate medication list due to potential drug-drug " "interactions, and not to begin new Rx or OTC meds without first discussing with provider or pharmacist.     After review of case and EKGs with Dr Hogue on day of discharge, pt was deemed stable and appropriate for discharge. Upon discharge, a 30-day dofetilide (Tikosyn) supply from the  outpatient pharmacy was provided to pt. Another prescription was sent to [facility] per pt request (90-day supply, no RF); pt is aware she needs to make an account at [employer] and website was given to pt.  Pt was discharged in satisfactory condition and will F/u with GWEN Rojas NP on 5/13/24 in [place] [address] with EKG.     *Pt will need BMP drawn every 3-6 months with PCP or primary cardiologist to evaluate renal function while on Tikosyn.\"    Patient doing relatively well following Tikosyn initiation.  Reports that today she started to have some \"lightheadedness\" that she says is not really lightheadedness, and she does not feel like she will pass out.  Just does not feel completely right in her head, like she is not herself.    Further, has been having left hip pain and instability for about 3 months.  No injury, insidious onset.    General: Alert, oriented, pleasant, in no acute distress  HEENT:      Head: normocephalic, atraumatic;      eyes: EOMI, no scleral icterus;   CV: Heart with regular rate and rhythm with some ectopy, normal S1/S2, no murmurs  Lungs: CTAB without wheezing, rhonchi or rales; good respiratory effort, no increased work of breathing  Neuro: Cranial nerves grossly intact; alert and oriented  Psych: Appropriate mood and affect    #A-fib #atrial flutter #HTN  -Blood pressure acceptable in office  - Auscultation reveals mostly sinus rhythm with some ectopic beats on long auscultation  - Continue current regimen through EP  - Continue amlodipine 5 mg daily, losartan 25 mg daily, metoprolol succinate 25 mg daily  -Regarding lightheaded feeling, she thinks it may be secondary to " not eating this morning, if symptoms persist after eating, patient should call EP office for guidance, or go to emergency room    #L hip pain and instability  -X-ray  - Physical therapy referral    Follow-up 3 months, Medicare at that time    Christopher D'Amico, DO

## 2024-04-10 NOTE — PROGRESS NOTES
"Pharmacist Clinic: Anticoagulation Management  Jazzy Schaeffer was referred to the Clinical Pharmacy Team for her anticoagulation management.    Referring Provider:  Dr. Thomas     Last Appointment w/ Pharmacist: 1/11/2024  Pharmacist Name: Anaya Loan  _______________________________________________________________________  PHARMACY ASSESSMENT    Review of Past Appointment:   - Patient was referred to clinical pharmacy for anticoagulation management. The patient was having trouble affording Eliquis so we screened her for [facility] PAP. However, patient never returned income documents to get application submitted. No Se or issues to report at that time.     RELEVANT LAB RESULTS  Lab Results   Component Value Date    BILITOT 0.5 12/26/2023    CALCIUM 9.2 04/05/2024    CO2 27 04/05/2024     04/05/2024    CREATININE 0.84 04/05/2024    GLUCOSE 84 04/05/2024    ALKPHOS 75 12/26/2023    K 4.4 04/05/2024    PROT 6.3 (L) 12/26/2023     04/05/2024    AST 17 12/26/2023    ALT 21 12/26/2023    BUN 19 04/05/2024    ANIONGAP 12 04/05/2024    MG 2.03 04/05/2024    PHOS 3.0 11/17/2023     (H) 11/13/2023    ALBUMIN 3.8 12/26/2023    AMYLASE 40 09/26/2019    LIPASE 10 11/12/2023    GFRF 90 01/12/2023     Lab Results   Component Value Date    TRIG 80 01/12/2023    CHOL 133 01/12/2023    HDL 55.1 01/12/2023     No results found for: \"BMCBC\", \"CBCDIF\"   _______________________________________________________________________  ANTICOAGULATION ASSESSMENT    The ASCVD Risk score (Westley JONES, et al., 2019) failed to calculate for the following reasons:    The patient has a prior MI or stroke diagnosis    DIAGNOSIS: treatment of 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  - Patient is projected to be on anticoagulation indefinitely  - ONW1HB0-GLRL Score: [8]   Age: [<65 (0)] [65-74 (+1)] [> 75 (+2)]: 2  Sex: [Male/Female (+1)]: 1  CHF history: [No/Yes(+1)]: 1  Hypertension history: [No/Yes(+1)]: " 1  Stroke/TIA/thromboembolism history: [No/Yes(+2)]: 2  Vascular disease history (prior MI, peripheral artery disease, aortic plaque): [No/Yes(+1)]: 1  Diabetes history: [No/Yes(+1)]: 0    CURRENT PHARMACOTHERAPY:   - Eliquis 5 mg PO BID  - 78 years old  - Scr 0.84 mg/dl   - 81.6 kg    UPDATE ON PHARMACOTHERAPY:   Affordability/Accessibility: Patient never returned income documents. Unsure if she needs to file or not. Information is with . Will follow up in 1 week to see what information we need to collect (depending on tax filing status)  Adherence/Organization: Eliquis 5 mg BID - 9 am and 9 pm   Adverse Effects: No  Recent Hospitalizations: Yes   - 4/2 - Tikosyn initiation - Patient states she has had a headache during the night since starting Tikosyn   Recent Falls/Trauma: Fell twice at the end of the March; reports weakness on left leg; fell forward; did not hit head   Changes in Tobacco or Alcohol Intake:   - Tobacco: No   - Alcohol: No    DISCUSSION/NOTES:  - Overall, patient is doing well with anticoagulation therapy. No SE or hospitalizations related to Eliquis to report. She did have Tikosyn initiation in the beginning of this month. She does report a headache throughout the night since then. She fell twice at the end of March. Reports it is due to weakness in left leg. We reviewed protocol of going to ED if she falls while being on anticoagulation. At last visit, we were trying to get her signed up for  PAP however she never returned proper documents. Pt is unsure if she needs to file or not. Information is with . Will follow up in 1 week to see what information we need to collect (depending on tax filing status)    _______________________________________________________________________  PATIENT EDUCATION/GOALS  - Counseled patient on MOA, expectations, duration of therapy, contraindications, administration, and monitoring parameters  - Counseled patient of side effects that are  indicative of bleeding such as dark tarry stool, unexplainable bruising, or vomiting up a coffee ground like substance  - Answered all patient questions and concerns  _______________________________________________________________________  RECOMMENDATIONS/PLAN  1. CONTINUE Eliquis 5 mg BID  2. Follow up 1 week to check on income documents    Next Cardiology Appointment: 6/10/2024  Clinical Pharmacist follow up: 4/18/2024  VAF/Application Expiration: No - PENDING  Type of Encounter: Arpit Monson PharmD    Verbal consent to manage patient's drug therapy was obtained from the patient . They were informed they may decline to participate or withdraw from participation in pharmacy services at any time.    Continue all meds under the continuation of care with the referring provider and clinical pharmacy team.

## 2024-04-11 ENCOUNTER — PATIENT OUTREACH (OUTPATIENT)
Dept: PRIMARY CARE | Facility: CLINIC | Age: 79
End: 2024-04-11

## 2024-04-11 ENCOUNTER — TELEMEDICINE (OUTPATIENT)
Dept: PHARMACY | Facility: HOSPITAL | Age: 79
End: 2024-04-11
Payer: MEDICARE

## 2024-04-11 DIAGNOSIS — I48.0 PAROXYSMAL ATRIAL FIBRILLATION (MULTI): Primary | ICD-10-CM

## 2024-04-11 NOTE — Clinical Note
Hi Dr. Thomas. Overall, patient is doing well with anticoagulation therapy. No SE or hospitalizations related to Eliquis to report. She did have Tikosyn initiation in the beginning of this month. She does report a headache throughout the night since then. She fell twice at the end of March. Reports it is due to weakness in left leg. We reviewed protocol of going to ED if she falls while being on anticoagulation. At last visit, we were trying to get her signed up for  PAP however she never returned proper documents. Pt is unsure if she needs to file or not. Information is with . Will follow up in 1 week to see what information we need to collect (depending on tax filing status)

## 2024-04-11 NOTE — PROGRESS NOTES
Call regarding appt. with PCP on (9 Apr 24) after hospitalization.  At time of outreach call the patient feels as if their condition has improved since last visit.  Reviewed the PCP appointment with the pt and addressed any questions or concerns.

## 2024-04-17 DIAGNOSIS — Z86.73: Primary | ICD-10-CM

## 2024-04-17 RX ORDER — APIXABAN 5 MG/1
5 TABLET, FILM COATED ORAL EVERY 12 HOURS
Qty: 180 TABLET | Refills: 3 | Status: SHIPPED | OUTPATIENT
Start: 2024-04-17 | End: 2025-04-17

## 2024-04-18 ENCOUNTER — APPOINTMENT (OUTPATIENT)
Dept: PHARMACY | Facility: HOSPITAL | Age: 79
End: 2024-04-18
Payer: MEDICARE

## 2024-05-01 ENCOUNTER — EVALUATION (OUTPATIENT)
Dept: PHYSICAL THERAPY | Facility: CLINIC | Age: 79
End: 2024-05-01
Payer: MEDICARE

## 2024-05-01 DIAGNOSIS — M25.552 LEFT HIP PAIN: Primary | ICD-10-CM

## 2024-05-01 DIAGNOSIS — M54.16 LUMBAR RADICULOPATHY: ICD-10-CM

## 2024-05-01 PROCEDURE — 97161 PT EVAL LOW COMPLEX 20 MIN: CPT | Mod: GP

## 2024-05-01 PROCEDURE — 97110 THERAPEUTIC EXERCISES: CPT | Mod: GP

## 2024-05-01 ASSESSMENT — ENCOUNTER SYMPTOMS
LOSS OF SENSATION IN FEET: 1
DEPRESSION: 0
OCCASIONAL FEELINGS OF UNSTEADINESS: 1

## 2024-05-01 ASSESSMENT — PAIN SCALES - GENERAL: PAINLEVEL_OUTOF10: 6

## 2024-05-01 NOTE — PROGRESS NOTES
Physical Therapy  Physical Therapy Orthopedic Evaluation    Patient Name: Jazzy Schaeffer  MRN: [id number]  Today's Date: 2024  Time Calculation  Start Time: 703  Stop Time: 48  Time Calculation (min): 45 min    Insurance:  Visit number: 1 of MN  Authorization info: Auth Needed  Insurance Type: [place] [employer]  Medicare Certification Period: Beginnin2024 Endin24  Onset date: 2023    General:  Reason for visit: L Hip Pain  Referred by: Christopher D'Amico, DO    Current Problem:  1. Lumbar radiculopathy        2. Left hip pain  Referral to Physical Therapy          Precautions:   Precautions  STEADI Fall Risk Score (The score of 4 or more indicates an increased risk of falling): 11      Medical History Form: Reviewed (scanned into chart)    Subjective:   Subjective   Chief Complaint: Patient presents to clinic with left low back, buttock and posterior leg.  Reports that she has numbness and tingling in the LLE. The left leg feels weak, particularly with stairs.   Notes she has difficulty with sit-stand and has to use her arms.  Has been worse since 2023.  Feels like she needs to get a cane.     Reports she had a mild CVA affecting her left side years ago.  Hx of neck surgery.  Still has numbness in LUE.    Onset Date: 2023  MIKE: Insidious and Chronic    Current Condition:   Worse    Pain:  Pain level currently: Pain Score: 6/10  Pain level at worst: 10/10  Pain level at best: 3/10  Location: left low back, buttock, posterior leg to the knee  Description: Pain, weakness, ache, throbbing  Aggravating Factors: Standing, walking  Relieving Factors:  Rest    Relevant Information:  PMH: Cancer, CVA  Previous Tests/Imaging: x-ray: moderate hip OA left; lower lumbar degenerative changes  Previous Interventions/Treatments: None    Prior Level of Function (PLOF)  Patient previously independent with all ADLs. Currently at 40% of PLOF.  Functional limitations: Difficulty with  sit-stand, standing >15 min, stairs--needs to lead with right; had to stop volunteering at [employer]  Exercise/Physical Activity: No  Work/School: Retired    Patients Living Environment: 1 story home with basement.  Laundry in basement.  Has railing. Uses shower bench.     Primary Language: English    Patient's Goal(s) for Therapy: Walk better, get stronger.     Red Flags: Do you have any of the following?   Fever/chills, unexplained weight changes, dizziness/fainting, unexplained change in bowel or bladder functions, unexplained malaise or muscle weakness, night pain/sweats,   Yes: numbness or tingling    Objective:  Objective     Posture: FH, kyphosis, LLE unweighted, L hip flexed, flexed at waist; Pain down LLE with attempt at standing up straighter    Gait: Antalgic on LLE, decreased stride length, decreased stance time        ROM    Lumbar AROM (%)    Flexion: 50%, Pain with return to neutral  Extension: Just to neutral; pain increase down LLE  (R) Side Bend: 50%, pulling  (L) Side Bend: 30%, Increased pain LLE  (R) Rotation: 50%  (L) Rotation: 50%    Moderate unsteadiness with lumbar AROM testing    Hip AROM (Degrees)       (R)  (L)  Flexion:  90  90    ER:   28  20     IR:   31  28      Hip PROM (Degrees)       (R)  (L)  Flexion:  105  108     Extension:  5  5        Knee AROM (Degrees)       (R)  (L)  Flexion:  117  108     Extension:  0  0        Strength Testing    Hip     (R)  (L)  Flexion:  4-  3     Extension:  Decreased lift with bridge    Abduction:  4-  3-    Adduction:  4+  4    ER:   4  3        Knee     (R)  (L)  Flexion:  4  4-     Extension:  4  3-    Ankle     (R)  (L)  Dorsiflexion:  4+  4-     Core Control:  Pelvic Bridge: Decreased lift with bridge       Flexibility       (R)  (L)  Hamstrings:  NT  NT  Hip Flexors:  Mod decrease Mod decrease       Functional Movement  Sit to stand: Slow with UE reliance  Bed Mobility: Independent, slow sit-supine       Balance    Single Leg Balance:  " (R)4\"  (L)Unable      Neuro:    Dermatomes: Intact to light touch      Outcome Measures:  Other Measures  Lower Extremity Funtional Score (LEFS): 5/80       Goals: Set and discussed today  Active       PT Problem       PT Goal 1       Start:  05/01/24    Expected End:  07/30/24       Lumbar ROM increased to WFL by week 12.  Stand 15 minutes with minimal difficulty by week 12.  Walk 15 minutes with minimal difficulty by week 12.  Reciprocal ascent/descent or stairs with use of railing by week 12.  LE functional strength improved as demonstrated by sit-stand with minimal difficulty by week 12.  LEFS score improved by at least 15 points by week 12.  Patient will rate pain < 4/10 at worst to improve ADL tolerance by week 12.  Independent with home exercise program for symptom reduction and functional gains by week 12.              Plan of care was developed with input and agreement by the patient      Treatment Performed:    Education: Home exercise program, plan of care, activity modifications, posture, pain management, and injury pathology. Discussed falls risk and recommendation for use of cane.        Instructed in initial home exercise program (Handout provided).    Personalized Home Exercise Program:  Access Code: [email or web address]  URL: [email or web address]  Date: 05/01/2024  Prepared by: Brittany Guzman    Exercises  - Supine Posterior Pelvic Tilt  - 1 x daily - 7 x weekly - 20 reps - 5 seconds hold  - Hooklying Single Knee to Chest Stretch  - 2 x daily - 7 x weekly - 3 reps - 30 seconds hold  - Beginner Bridge  - 1 x daily - 7 x weekly - 3 sets - 10 reps - 5 second hold  - Supine March  - 1 x daily - 7 x weekly - 3 sets - 10 reps    Assessment: Patient presents to PT with chief complaint of back, buttock and LLE pain consistent with lumbar radiculopathy and L hip OA.  Upon examination, patient demonstrates decreased lumbar and hip ROM, decreased core and LLE strength, and impaired balance " measures.  These impairments are resulting in difficulty with walking, standing, stairs, transfers and performance of self care and usual activities.  Patient would benefit from course of PT to address these impairments, reduce pain, and assist patient in returning to PLOF.           Plan:     Planned Interventions include: therapeutic exercise, self-care home management, manual therapy, therapeutic activities, gait training, neuromuscular coordination, aquatic therapy  Frequency: 1-2 x Week  Duration: 12 Weeks    Initiate with aquatic therapy.       Brittany Guzman, PT

## 2024-05-02 DIAGNOSIS — I48.91 ATRIAL FIBRILLATION (MULTI): ICD-10-CM

## 2024-05-02 RX ORDER — DOFETILIDE 0.25 MG/1
250 CAPSULE ORAL EVERY 12 HOURS
Qty: 180 CAPSULE | Refills: 3 | Status: SHIPPED | OUTPATIENT
Start: 2024-05-02 | End: 2024-05-08 | Stop reason: SDUPTHER

## 2024-05-06 ENCOUNTER — PATIENT OUTREACH (OUTPATIENT)
Dept: PRIMARY CARE | Facility: CLINIC | Age: 79
End: 2024-05-06
Payer: MEDICARE

## 2024-05-07 ENCOUNTER — TREATMENT (OUTPATIENT)
Dept: PHYSICAL THERAPY | Facility: CLINIC | Age: 79
End: 2024-05-07
Payer: MEDICARE

## 2024-05-07 DIAGNOSIS — M54.16 LUMBAR RADICULOPATHY: ICD-10-CM

## 2024-05-07 DIAGNOSIS — M25.552 LEFT HIP PAIN: ICD-10-CM

## 2024-05-07 DIAGNOSIS — M25.559 HIP PAIN: Primary | ICD-10-CM

## 2024-05-07 PROCEDURE — 97113 AQUATIC THERAPY/EXERCISES: CPT | Mod: GP,CQ | Performed by: SPECIALIST/TECHNOLOGIST

## 2024-05-07 ASSESSMENT — PAIN SCALES - GENERAL: PAINLEVEL_OUTOF10: 9

## 2024-05-07 ASSESSMENT — PAIN - FUNCTIONAL ASSESSMENT: PAIN_FUNCTIONAL_ASSESSMENT: 0-10

## 2024-05-07 NOTE — PROGRESS NOTES
Physical Therapy  Physical Therapy Treatment    Patient Name: Jazzy Schaeffer  MRN: [id number]  Today's Date: 5/7/2024  Time Calculation  Start Time: 0825  Stop Time: 0906  Time Calculation (min): 41 min    Insurance:  Visit number: 2 of 5  Authorization info: Auth Needed after IE  Insurance Type: [place] [employer]  5 Authorized 5/1/24 to 6/29/24  Cert date start: 5/1/24        Cert date end: 7/30/24                General  Reason for Referral: Hip pain/Lumbar radic  Referred By: C. D'Amico, DO    Current Problem  1. Hip pain        2. Lumbar radiculopathy            Precautions  STEADI Fall Risk Score (The score of 4 or more indicates an increased risk of falling): 11    Pain Assessment: 0-10  Pain Score: 9  Pain Location: Hip    Subjective:   Patient arrived full weight bearing with a slow antalgic gait. Patient notes pain 9 of 10 on arrival this AM.  HEP Performed:  Yes    Objective:   Slightly forward posture.       Treatments:   Pool Depth: 4 foot, Temperature 92°  Forward/retro walk 3'   Side Step 4'   MIP 4'  Alt Hams 4'  HR/TR 2'  Hip ext R/L 2' each   Hip abd/add R/L 2' each   Floats: Shoulder extension R/L - 2' wide base 2' horiz abd   Kickboard row 2'   Standing HF stretch R/L 1'  Standing Hams stretch on stair R/L 1'     Charges: AQ x3     Assessment: Patient tolerated aquatic session noting pain decreased to 6 of 10 post treatment.       Plan: Continue aquatic sessions to decrease hip pain and radicular symptoms.     Jonathan Austin, PTA

## 2024-05-08 ENCOUNTER — APPOINTMENT (OUTPATIENT)
Dept: PHYSICAL THERAPY | Facility: CLINIC | Age: 79
End: 2024-05-08
Payer: MEDICARE

## 2024-05-08 ENCOUNTER — PHARMACY VISIT (OUTPATIENT)
Dept: PHARMACY | Facility: CLINIC | Age: 79
End: 2024-05-08
Payer: MEDICARE

## 2024-05-08 DIAGNOSIS — I48.91 ATRIAL FIBRILLATION (MULTI): ICD-10-CM

## 2024-05-08 PROCEDURE — RXMED WILLOW AMBULATORY MEDICATION CHARGE

## 2024-05-08 RX ORDER — DOFETILIDE 0.25 MG/1
250 CAPSULE ORAL EVERY 12 HOURS
Qty: 60 CAPSULE | Refills: 1 | Status: SHIPPED | OUTPATIENT
Start: 2024-05-08 | End: 2024-05-13 | Stop reason: SDUPTHER

## 2024-05-09 ENCOUNTER — APPOINTMENT (OUTPATIENT)
Dept: PHARMACY | Facility: HOSPITAL | Age: 79
End: 2024-05-09
Payer: MEDICARE

## 2024-05-13 ENCOUNTER — OFFICE VISIT (OUTPATIENT)
Dept: CARDIOLOGY | Facility: HOSPITAL | Age: 79
End: 2024-05-13
Payer: MEDICARE

## 2024-05-13 ENCOUNTER — HOSPITAL ENCOUNTER (OUTPATIENT)
Dept: CARDIOLOGY | Facility: HOSPITAL | Age: 79
Discharge: HOME | End: 2024-05-13
Payer: MEDICARE

## 2024-05-13 ENCOUNTER — APPOINTMENT (OUTPATIENT)
Dept: CARDIOLOGY | Facility: HOSPITAL | Age: 79
End: 2024-05-13
Payer: MEDICARE

## 2024-05-13 VITALS
WEIGHT: 186.38 LBS | HEART RATE: 73 BPM | HEIGHT: 65 IN | BODY MASS INDEX: 31.05 KG/M2 | SYSTOLIC BLOOD PRESSURE: 152 MMHG | OXYGEN SATURATION: 94 % | DIASTOLIC BLOOD PRESSURE: 81 MMHG

## 2024-05-13 DIAGNOSIS — I48.0 PAF (PAROXYSMAL ATRIAL FIBRILLATION) (MULTI): Primary | ICD-10-CM

## 2024-05-13 DIAGNOSIS — I48.91 ATRIAL FIBRILLATION (MULTI): ICD-10-CM

## 2024-05-13 DIAGNOSIS — I10 ESSENTIAL HYPERTENSION: Primary | ICD-10-CM

## 2024-05-13 DIAGNOSIS — E78.5 HYPERLIPIDEMIA: ICD-10-CM

## 2024-05-13 DIAGNOSIS — I48.0 PAF (PAROXYSMAL ATRIAL FIBRILLATION) (MULTI): ICD-10-CM

## 2024-05-13 LAB — BODY SURFACE AREA: 1.97 M2

## 2024-05-13 PROCEDURE — 3077F SYST BP >= 140 MM HG: CPT | Performed by: NURSE PRACTITIONER

## 2024-05-13 PROCEDURE — 93005 ELECTROCARDIOGRAM TRACING: CPT | Mod: 59 | Performed by: NURSE PRACTITIONER

## 2024-05-13 PROCEDURE — 93010 ELECTROCARDIOGRAM REPORT: CPT | Performed by: INTERNAL MEDICINE

## 2024-05-13 PROCEDURE — 1125F AMNT PAIN NOTED PAIN PRSNT: CPT | Performed by: NURSE PRACTITIONER

## 2024-05-13 PROCEDURE — 99214 OFFICE O/P EST MOD 30 MIN: CPT | Performed by: NURSE PRACTITIONER

## 2024-05-13 PROCEDURE — 93246 EXT ECG>7D<15D RECORDING: CPT

## 2024-05-13 PROCEDURE — 1160F RVW MEDS BY RX/DR IN RCRD: CPT | Performed by: NURSE PRACTITIONER

## 2024-05-13 PROCEDURE — 3079F DIAST BP 80-89 MM HG: CPT | Performed by: NURSE PRACTITIONER

## 2024-05-13 PROCEDURE — 1159F MED LIST DOCD IN RCRD: CPT | Performed by: NURSE PRACTITIONER

## 2024-05-13 RX ORDER — ROSUVASTATIN CALCIUM 5 MG/1
5 TABLET, COATED ORAL NIGHTLY
Qty: 90 TABLET | Refills: 3 | Status: SHIPPED | OUTPATIENT
Start: 2024-05-13 | End: 2025-05-13

## 2024-05-13 RX ORDER — DOFETILIDE 0.25 MG/1
250 CAPSULE ORAL EVERY 12 HOURS
Qty: 180 CAPSULE | Refills: 3 | Status: SHIPPED | OUTPATIENT
Start: 2024-05-13 | End: 2025-05-13

## 2024-05-13 RX ORDER — LANOLIN ALCOHOL/MO/W.PET/CERES
1 CREAM (GRAM) TOPICAL DAILY
COMMUNITY
Start: 2024-04-08 | End: 2024-06-10 | Stop reason: ALTCHOICE

## 2024-05-13 RX ORDER — LOSARTAN POTASSIUM 25 MG/1
25 TABLET ORAL DAILY
Qty: 90 TABLET | Refills: 3 | Status: SHIPPED | OUTPATIENT
Start: 2024-05-13 | End: 2025-05-13

## 2024-05-13 ASSESSMENT — COLUMBIA-SUICIDE SEVERITY RATING SCALE - C-SSRS
2. HAVE YOU ACTUALLY HAD ANY THOUGHTS OF KILLING YOURSELF?: NO
6. HAVE YOU EVER DONE ANYTHING, STARTED TO DO ANYTHING, OR PREPARED TO DO ANYTHING TO END YOUR LIFE?: NO
1. IN THE PAST MONTH, HAVE YOU WISHED YOU WERE DEAD OR WISHED YOU COULD GO TO SLEEP AND NOT WAKE UP?: NO

## 2024-05-13 ASSESSMENT — ENCOUNTER SYMPTOMS
LOSS OF SENSATION IN FEET: 0
DEPRESSION: 0
OCCASIONAL FEELINGS OF UNSTEADINESS: 1

## 2024-05-13 ASSESSMENT — PATIENT HEALTH QUESTIONNAIRE - PHQ9
2. FEELING DOWN, DEPRESSED OR HOPELESS: NOT AT ALL
SUM OF ALL RESPONSES TO PHQ9 QUESTIONS 1 AND 2: 0
1. LITTLE INTEREST OR PLEASURE IN DOING THINGS: NOT AT ALL

## 2024-05-13 ASSESSMENT — PAIN SCALES - GENERAL: PAINLEVEL: 7

## 2024-05-13 NOTE — PROGRESS NOTES
"Subjective   Jazzy Schaeffer is a 78 y.o. female.    Chief Complaint:  Establish Care and ABN Follow-Up    Jazzy Schaeffer is a 78 y.o. with:      1. Anxiety, GERD, HTN, hx of falls   2. EVA and COPD  3. Lt Breast Ca and Bladder Ca  4. Polymyalgia Rheumatica   5. Claudication, PVD, and CAD (mod LAD and diagonal branch disease)   6. CVA and TIA   7. CHF  8. Persistent AF/ AFL - Index diagnosis in December of 2023 probably s/p successful DCCV (1/24/24).  EKG (3/11/24) AFL  9. Vertigo     TESTING  -ECHO/ TTE:  (11/14/23) LVEF 45-50%.  LV restrictive pattern diastolic filling.  Hypokinesis of apico-septal and apico-inferior along with basal inferior and infero-lateral segments.  Sev LAE and mod-sev FRANCIS.  RVSP 37.3 mmHg.  LVIDd 5.1cm     Now s/p Dofetilide initiation 4/2-4/5/2024  Patient converted to NSR after second dose of Dofetilide and metoprolol was decreased  ECG 5/13/2024 NSR with SA, HR 71 bpm, Qtc 462ms    TODAY Patient presents for 1 month follow up post Dofetilide  inititation. Overall, she has been doing okay since her discharge. She had some episodes of dizziness, about 7-8 times since her discharge.  She has a history of vertigo.  These episodes feel a little different like the room is about to start spinning and then is doesn't.  She would take a meclizine and then lay down for an hour or two.  She denies any syncope, headache or diarrhea.    /81 (BP Location: Left arm, Patient Position: Sitting, BP Cuff Size: Large adult)   Pulse 73   Ht 1.651 m (5' 5\")   Wt 84.5 kg (186 lb 6 oz)   SpO2 94%   BMI 31.01 kg/m²   Current Outpatient Medications on File Prior to Visit   Medication Sig Dispense Refill    amLODIPine (Norvasc) 5 mg tablet Take 1 tablet (5 mg) by mouth once daily. 90 tablet 3    aspirin 81 mg EC tablet Take 1 tablet (81 mg) by mouth once daily.      cholecalciferol (Vitamin D-3) 5,000 Units tablet Take 1 tablet (5,000 Units) by mouth once daily.      cyanocobalamin (Vitamin B-12) " 1,000 mcg tablet Take 1 tablet (1,000 mcg) by mouth once daily.      Eliquis 5 mg tablet Take 1 tablet (5 mg) by mouth every 12 hours. 180 tablet 3    fluticasone (Flonase) 50 mcg/actuation nasal spray Administer 1 spray into each nostril once daily as needed for rhinitis or allergies. Shake gently. Before first use, prime pump. After use, clean tip and replace cap.      furosemide (Lasix) 20 mg tablet Take 1 tablet (20 mg) by mouth once daily. 90 tablet 3    losartan (Cozaar) 25 mg tablet Take 1 tablet (25 mg) by mouth once daily. 90 tablet 3    magnesium oxide (Mag-Ox) 400 mg (241.3 mg magnesium) tablet Take 1 tablet (400 mg) by mouth once daily.      magnesium oxide (Mag-Ox) 400 mg tablet Take 1 tablet (400 mg) by mouth once daily. 90 tablet 1    metoprolol succinate XL (Toprol-XL) 25 mg 24 hr tablet Take 1 tablet (25 mg) by mouth once daily in the morning. Do not start before April 5, 2024. 90 tablet 0    multivitamin tablet Take 1 tablet by mouth once daily.      nitroglycerin (Nitrostat) 0.4 mg SL tablet Place 1 tablet (0.4 mg) under the tongue every 5 minutes if needed for chest pain. 25 tablet 3    rosuvastatin (Crestor) 5 mg tablet Take 1 tablet (5 mg) by mouth once daily at bedtime. 90 tablet 3    [DISCONTINUED] dofetilide (Tikosyn) 250 mcg capsule Take 1 capsule (250 mcg) by mouth every 12 hours. 60 capsule 1    [DISCONTINUED] dofetilide (Tikosyn) 250 mcg capsule Take 1 capsule (250 mcg) by mouth every 12 hours. 180 capsule 3    [DISCONTINUED] losartan (Cozaar) 25 mg tablet Take 1 tablet (25 mg) by mouth once daily.      [DISCONTINUED] rosuvastatin (Crestor) 5 mg tablet Take 1 tablet (5 mg) by mouth once daily at bedtime.       No current facility-administered medications on file prior to visit.         Review of Systems   Constitutional: Negative for diaphoresis, fever and malaise/fatigue.   HENT:  Negative for congestion and sore throat.    Eyes:  Negative for blurred vision and double vision.    Cardiovascular:  Negative for chest pain, dyspnea on exertion, irregular heartbeat, leg swelling, near-syncope, orthopnea, palpitations, paroxysmal nocturnal dyspnea and syncope.   Respiratory:  Negative for cough, hemoptysis, shortness of breath, snoring and sputum production.    Hematologic/Lymphatic: Negative for bleeding problem.   Skin:  Negative for rash.   Musculoskeletal:  Negative for falls, joint pain and myalgias.   Gastrointestinal:  Negative for abdominal pain, diarrhea, nausea and vomiting.   Neurological:  Positive for dizziness. Negative for headaches, light-headedness and weakness.   All other systems reviewed and are negative.      Objective   Constitutional:       Appearance: Healthy appearance. Not in distress.   Eyes:      Conjunctiva/sclera: Conjunctivae normal.      Pupils: Pupils are equal, round, and reactive to light.   HENT:    Mouth/Throat:      Pharynx: Oropharynx is clear.   Pulmonary:      Effort: Pulmonary effort is normal.      Breath sounds: Normal breath sounds. No wheezing. No rhonchi. No rales.   Cardiovascular:      PMI at left midclavicular line. Normal rate. Regular rhythm.      Murmurs: There is no murmur.      No gallop.    Pulses:     Intact distal pulses.   Edema:     Peripheral edema absent.   Abdominal:      General: Bowel sounds are normal.      Palpations: Abdomen is soft.      Tenderness: There is no abdominal tenderness.   Musculoskeletal: Normal range of motion.         General: No tenderness. Skin:     General: Skin is warm and dry.   Neurological:      General: No focal deficit present.      Mental Status: Alert and oriented to person, place and time.         Lab Review:   Lab Results   Component Value Date     04/05/2024    K 4.4 04/05/2024     04/05/2024    CO2 27 04/05/2024    BUN 19 04/05/2024    CREATININE 0.84 04/05/2024    GLUCOSE 84 04/05/2024    CALCIUM 9.2 04/05/2024     Lab Results   Component Value Date    WBC 5.2 04/02/2024    HGB 12.8  04/02/2024    HCT 42.5 04/02/2024    MCV 80 04/02/2024     04/02/2024       Assessment/Plan   The encounter diagnosis was PAF (paroxysmal atrial fibrillation) (Multi).  Patient in NSR 1 month post Dofetilide initiation.  She is having some dizziness since her discharge, more than before.  She has a history of vertigo, and these episodes feel like the room is about to start spinning and then is doesn't.  Once she felt like she was close to passing out.    We will check a 2 week monitor to reevaluate her heart rhythm.   After review with Dr. Hogue, it is unlikely that this is cardiac related given that her QTC is well within normal limits    Tikosyn education reinforced.  Patient encouraged to take it every 12 hours. Do not double dose if a dose it missed, just take the next dose.  If 3 or more doses are missed in a row, patient will have to be reinitiated on the medication in the hospital. Patient encouraged to tell all prescribers that he is on Dofetilide due to potential drug interactions.  ECG recommended every 6 months, along with BMP/Magnesium levels  All questions asked and answered    I will call her with monitor results and if all is normal, we will continue Dofetilide and follow up in 6 months with blood work.

## 2024-05-14 ASSESSMENT — ENCOUNTER SYMPTOMS
SORE THROAT: 0
VOMITING: 0
FALLS: 0
DOUBLE VISION: 0
PALPITATIONS: 0
SYNCOPE: 0
PND: 0
DIARRHEA: 0
BLURRED VISION: 0
MYALGIAS: 0
LIGHT-HEADEDNESS: 0
COUGH: 0
HEADACHES: 0
ORTHOPNEA: 0
IRREGULAR HEARTBEAT: 0
ABDOMINAL PAIN: 0
DYSPNEA ON EXERTION: 0
HEMOPTYSIS: 0
SPUTUM PRODUCTION: 0
NAUSEA: 0
DIAPHORESIS: 0
DIZZINESS: 1
FEVER: 0
WEAKNESS: 0
NEAR-SYNCOPE: 0
SNORING: 0
SHORTNESS OF BREATH: 0

## 2024-05-16 LAB
ATRIAL RATE: 71 BPM
P AXIS: 73 DEGREES
P OFFSET: 209 MS
P ONSET: 149 MS
PR INTERVAL: 142 MS
Q ONSET: 220 MS
QRS COUNT: 11 BEATS
QRS DURATION: 96 MS
QT INTERVAL: 426 MS
QTC CALCULATION(BAZETT): 462 MS
QTC FREDERICIA: 450 MS
R AXIS: 66 DEGREES
T AXIS: 68 DEGREES
T OFFSET: 433 MS
VENTRICULAR RATE: 71 BPM

## 2024-05-17 ENCOUNTER — APPOINTMENT (OUTPATIENT)
Dept: PHYSICAL THERAPY | Facility: CLINIC | Age: 79
End: 2024-05-17
Payer: MEDICARE

## 2024-05-24 ENCOUNTER — TREATMENT (OUTPATIENT)
Dept: PHYSICAL THERAPY | Facility: CLINIC | Age: 79
End: 2024-05-24
Payer: MEDICARE

## 2024-05-24 DIAGNOSIS — M54.16 LUMBAR RADICULOPATHY: ICD-10-CM

## 2024-05-24 DIAGNOSIS — M25.552 LEFT HIP PAIN: ICD-10-CM

## 2024-05-24 PROCEDURE — 97113 AQUATIC THERAPY/EXERCISES: CPT | Mod: GP,CQ | Performed by: SPECIALIST/TECHNOLOGIST

## 2024-05-24 ASSESSMENT — PAIN - FUNCTIONAL ASSESSMENT: PAIN_FUNCTIONAL_ASSESSMENT: 0-10

## 2024-05-24 ASSESSMENT — PAIN SCALES - GENERAL: PAINLEVEL_OUTOF10: 10 - WORST POSSIBLE PAIN

## 2024-05-24 NOTE — PROGRESS NOTES
Physical Therapy  Physical Therapy Treatment    Patient Name: Jazzy Schaeffer  MRN: [id number]  Today's Date: 5/24/2024  Time Calculation  Start Time: 0855  Stop Time: 0946  Time Calculation (min): 51 min    Insurance:  Visit number: 3 of 5  Authorization info: Auth Needed after IE  Insurance Type: [place] [employer]  5 Authorized 5/1/24 to 6/29/24  Cert date start: 5/1/24        Cert date end: 7/30/24                General  Reason for Referral: Hip pain/Lumbar radic  Referred By: C. D'Amico, DO    Current Problem  1. Left hip pain  Follow Up In Physical Therapy      2. Lumbar radiculopathy  Follow Up In Physical Therapy          Precautions  STEADI Fall Risk Score (The score of 4 or more indicates an increased risk of falling): 11    Pain Assessment: 0-10  Pain Score: 10 - Worst possible pain  Pain Location: Hip    Subjective:   Patient arrived full weight bearing with a slow antalgic gait. Patient notes pain 10 of 10 on arrival. Patient notes relief after last session lasted until evening.  Patient notes constant soreness that did not change with therapy.    HEP Performed:  Yes    Objective:   Slightly forward posture.       Treatments:   Pool Depth: 4 foot, Temperature 92°  Forward walk 3'   Retro walk 3'   Side Step 5'   MIP 4'  Alt Hams 4'  HR/TR 2'  Hip ext R/L 2' each   Hip abd/add R/L 2' each   Floats: Shoulder extension R/L - 2' wide base 2' horiz abd   Noodle 4 foot hang 3'  Standing HF stretch R/L 1'  Standing Hams stretch on stair R/L 1'     Charges: AQ x3     Assessment: Patient tolerated aquatic session noting pain decreased to 5 of 10 post treatment.  Patient felt hanging in pool really decreased L hip soreness.        Plan: Continue aquatic sessions to decrease hip pain and radicular symptoms. Patient has 1 more aquatic session prior to re-check with Cynthia Guzman PT, AT on June 5, 2024.     Jonathan Austin PTA

## 2024-05-29 ENCOUNTER — DOCUMENTATION (OUTPATIENT)
Dept: PRIMARY CARE | Facility: CLINIC | Age: 79
End: 2024-05-29
Payer: MEDICARE

## 2024-05-29 ENCOUNTER — PATIENT OUTREACH (OUTPATIENT)
Dept: PRIMARY CARE | Facility: CLINIC | Age: 79
End: 2024-05-29
Payer: MEDICARE

## 2024-05-29 DIAGNOSIS — J44.9 CHRONIC OBSTRUCTIVE PULMONARY DISEASE, UNSPECIFIED COPD TYPE (MULTI): ICD-10-CM

## 2024-05-29 DIAGNOSIS — I10 ESSENTIAL HYPERTENSION: ICD-10-CM

## 2024-05-29 DIAGNOSIS — I48.91 ATRIAL FIBRILLATION, UNSPECIFIED TYPE (MULTI): ICD-10-CM

## 2024-05-29 ASSESSMENT — ENCOUNTER SYMPTOMS
OCCASIONAL FEELINGS OF UNSTEADINESS: 1
DEPRESSION: 0
LOSS OF SENSATION IN FEET: 0

## 2024-05-29 NOTE — PROGRESS NOTES
MICHELLE EVANGELISTA spoke with Jazzy, identified by name, shannan. Jazzy was introduced to [facility]  Patient verbally opted into services on: 2024  Services will be performed under the direction of PCP: Dr D'Amico  Patient has planned AWV or Follow-Up appointment on: 2024    POC will be derived from provider's comprehensive assessment and outlined plan of care found in AWV or other follow up    I have discussed the nature and availability of the service with the patient, the patient's responsibility for potential cost sharing, only one practitioner furnishing services during a calendar month, and the right to stop services at any time     Jazzy has a history of CVA. Patient was identified as a fall risk. Risk prevention instructions provided. She is currently receiving weekly physical therapy sessions. She reports is helpful. Discussed decreasing risks of exacerbation with chronic conditions such as COPD, Heart Failure. Other treatment goals discussed. Additional questions were answered.     RN CM verified contact information, encouraging Jazzy to call with questions/concerns

## 2024-05-31 ENCOUNTER — TREATMENT (OUTPATIENT)
Dept: PHYSICAL THERAPY | Facility: CLINIC | Age: 79
End: 2024-05-31
Payer: MEDICARE

## 2024-05-31 DIAGNOSIS — M25.552 LEFT HIP PAIN: ICD-10-CM

## 2024-05-31 DIAGNOSIS — M54.16 LUMBAR RADICULOPATHY: ICD-10-CM

## 2024-05-31 PROCEDURE — 97113 AQUATIC THERAPY/EXERCISES: CPT | Mod: GP,CQ | Performed by: SPECIALIST/TECHNOLOGIST

## 2024-05-31 ASSESSMENT — PAIN SCALES - GENERAL: PAINLEVEL_OUTOF10: 4

## 2024-05-31 ASSESSMENT — PAIN - FUNCTIONAL ASSESSMENT: PAIN_FUNCTIONAL_ASSESSMENT: 0-10

## 2024-05-31 NOTE — PROGRESS NOTES
Physical Therapy  Physical Therapy Treatment    Patient Name: Jazzy Schaeffer  MRN: [id number]  Today's Date: 5/31/2024  Time Calculation  Start Time: 0832  Stop Time: 0919  Time Calculation (min): 47 min    Insurance:  Visit number: 3 of 5  Authorization info: Auth Needed after IE  Insurance Type: [place] [employer]  5 Authorized 5/1/24 to 6/29/24  Cert date start: 5/1/24        Cert date end: 7/30/24                General  Reason for Referral: Hip pain/Lumbar radic  Referred By: C. D'Amico, DO    Current Problem  1. Left hip pain  Follow Up In Physical Therapy      2. Lumbar radiculopathy  Follow Up In Physical Therapy          Precautions  STEADI Fall Risk Score (The score of 4 or more indicates an increased risk of falling): 11    Pain Assessment: 0-10  Pain Score: 4  Pain Location: Hip    Subjective:   Patient arrived full weight bearing with a slow antalgic gait. Patient notes less ache and more activity over the last week.  Patient notes pain   HEP Performed:  Yes    Objective:   Slightly forward posture.       Treatments:   Pool Depth: 4 foot, Temperature 92°  Forward walk 3'   Retro walk 3'   Side Step 5'   MIP 4'    Noodle 5' hang   Alt Hams 4'  Hip ext R/L 2' each   Hip abd/add R/L 2' each   Hang 3'    Floats: Shoulder extension R/L - 2' wide base 2' horiz abd   Standing HF stretch R/L 1'  Standing Hams stretch on stair R/L 1'     Charges: AQ x3     Assessment: Patient tolerated aquatic session noting pain decreased to 2 of 10 post treatment.  Patient felt hanging in pool really decreased L hip soreness.        Plan: Continue aquatic sessions to decrease hip pain and radicular symptoms. Patient has 1 more aquatic session prior to re-check with Cynthia Guzman PT, AT on June 5, 2024.     Jonathan Austin, PTA

## 2024-06-03 NOTE — PROGRESS NOTES
Physical Therapy  Physical Therapy Treatment Note and Progress Note    Patient Name: Jazzy Schaeffer  MRN: [id number]  Today's Date: 2024  Time Calculation  Start Time: 0910  Stop Time: 1001  Time Calculation (min): 51 min    Insurance:  Visit number: 4 of 5  Authorization info: Auth needed  Insurance Type: [place] [employer]  5 Authorized 24 to 24  Medicare Certification Period: Beginnin24 Endin24  Onset date: 2023    General:  Reason for visit: L Hip Pain  Referred by: Christopher D'Amico, DO    Current Problem  1. Left hip pain        2. Lumbar radiculopathy            Precautions: STEADI Fall Risk Score (The score of 4 or more indicates an increased risk of falling): 11       Subjective:     Patient reports 4/10 pain in L leg.  Pain radiates down the posterior thigh.  Gets some pulling and tingling in the L ankle and foot. Uses cane intermittently.  Denies any new falls.  Has been consciously making efforts to stand up straighter.     Notes that the water therapy has been helping and feels that the pain intensity has improved. Notes increased ease with lifting L leg to put on stockings.  Reports that she can get up with more ease from a high chair, but still has a hard time getting up from a low chair without using her arms.  Feels that she needs to keep moving so that she does not stiffen up.     Exacerbating Factors: Sit-stand, prolonged standing and walking    Functional Limitations: Difficulty with sit-stand, walking >10-15 min, Volunteering at [employer], Avoids stairs, Carrying laundry baskets on stairs    Self Reported Function (0-100%) = 40%  - 100% being back to PLOF      Pain  Pain Score: 4    Performing HEP?: Yes      Objective:   Posture: FH, kyphosis, mildly flexed at waist. Able to correct with cueing     Gait: Mildly antalgic on LLE, decreased stride length, decreased stance time                 Functional Movement  Sit to stand: Slow with UE reliance                                          Neuro:               Dermatomes: Intact to light touch        Outcome Measures:  Other Measures  Lower Extremity Funtional Score (LEFS): 19/80  (5/80 at initial eval)      Treatment Performed:    Aquatic Therapy:     51 min  Water temp:  92 degrees    4 Foot Water Depth:   Ambulation with focus on upright posture:  Forward/backward  5'  Sidestepping 5'  Standing exercises at bar with abdominals engaged:  MIP: 4'  Alt Hip Abd/Add  4'  A/P LE Swings 3' R/L  LE circles CW/CCW 3' R/L  HS curls 3' R/L  Heel/toe raises 3'    5 Foot Water Depth:   LE hang with noodle 4'  Hip abd/add R/L 2' each     3 Foot Water Depth:   Sit-stand 3'    Provided patient with new aquatic HEP.      Personalized Home Exercise Program:  Access Code: [email or web address]  URL: [email or web address]  Date: 05/01/2024  Prepared by: Brittany Guzman     Exercises  - Supine Posterior Pelvic Tilt  - 1 x daily - 7 x weekly - 20 reps - 5 seconds hold  - Hooklying Single Knee to Chest Stretch  - 2 x daily - 7 x weekly - 3 reps - 30 seconds hold  - Beginner Bridge  - 1 x daily - 7 x weekly - 3 sets - 10 reps - 5 second hold  - Supine March  - 1 x daily - 7 x weekly - 3 sets - 10 reps      Access Code: [email or web address]  URL: [email or web address]  Date: 06/05/2024  Prepared by: Brittany Guzman    Exercises  - Forward Walking  - 2 x weekly - 3 minutes duration  - Backward Walking  - 2 x weekly - 3 minutes duration  - Side Stepping  - 2 x weekly - 5 minutes duration  - Standing Hip Abduction Adduction at Pool Wall  - 2 x weekly - 4 minute duration  - Standing Hip Flexion Extension at Pool Wall  - 2 x weekly - 4 minute duration  - Forward March  - 2 x weekly - 4 minutes duration  - Standing Hip Circles at Pool Wall  - 2 x weekly - 4 minute duration  - Standing Knee Flexion  - 2 x weekly - 4 minute duration  - Squat  - 2 x weekly - 4 minute duration  - Hip Flexor Stretch at Pool Wall  - 2 x  weekly - 3 reps - 30 seconds hold  - Hamstring Stretch at Pool Wall  - 2 x weekly - 3 reps - 30  hold        Active       PT Problem       PT Goal 1 (Progressing)       Start:  05/01/24    Expected End:  07/30/24       Lumbar ROM increased to WFL by week 12. Progressing  Stand 15 minutes with minimal difficulty by week 12. Progressing  Walk 15 minutes with minimal difficulty by week 12. Progressing  Reciprocal ascent/descent or stairs with use of railing by week 12. Progressing  LE functional strength improved as demonstrated by sit-stand with minimal difficulty by week 12. Progressing  LEFS score improved by at least 15 points by week 12. Progressing  Patient will rate pain < 4/10 at worst to improve ADL tolerance by week 12. Progressing  Independent with home exercise program for symptom reduction and functional gains by week 12.  Progressing              Assessment:   Ms. Schaeffer is responding well to aquatic therapy with reductions in pain intensity and some gains in her functional ability.  She demonstrates a more upright standing posture.  She has made gains in her function with sit to stand and with donning/doffing clothing.  She still has limitations in standing and walking tolerance, stairs, and sit-stand from a lower chair for which additional physical therapy is medically necessary.  Recommend continuing with PT, pending insurance authorization with a combination of aquatic and land based sessions.        Plan:  Continue 1-2/week x 5-6 weeks pending insurance authorization.  Will proceed with a combination of aquatic and land based sessions.       Brittany Guzman, PT

## 2024-06-05 ENCOUNTER — TREATMENT (OUTPATIENT)
Dept: PHYSICAL THERAPY | Facility: CLINIC | Age: 79
End: 2024-06-05
Payer: MEDICARE

## 2024-06-05 DIAGNOSIS — M25.552 LEFT HIP PAIN: Primary | ICD-10-CM

## 2024-06-05 DIAGNOSIS — M54.16 LUMBAR RADICULOPATHY: ICD-10-CM

## 2024-06-05 PROCEDURE — 97113 AQUATIC THERAPY/EXERCISES: CPT | Mod: GP

## 2024-06-05 ASSESSMENT — PAIN SCALES - GENERAL: PAINLEVEL_OUTOF10: 4

## 2024-06-06 ENCOUNTER — PATIENT OUTREACH (OUTPATIENT)
Dept: CARE COORDINATION | Facility: CLINIC | Age: 79
End: 2024-06-06
Payer: MEDICARE

## 2024-06-10 ENCOUNTER — OFFICE VISIT (OUTPATIENT)
Dept: CARDIOLOGY | Facility: CLINIC | Age: 79
End: 2024-06-10
Payer: MEDICARE

## 2024-06-10 VITALS
HEIGHT: 65 IN | BODY MASS INDEX: 31.65 KG/M2 | WEIGHT: 190 LBS | SYSTOLIC BLOOD PRESSURE: 135 MMHG | DIASTOLIC BLOOD PRESSURE: 85 MMHG | HEART RATE: 73 BPM | OXYGEN SATURATION: 99 %

## 2024-06-10 DIAGNOSIS — I48.91 ATRIAL FIBRILLATION, UNSPECIFIED TYPE (MULTI): Primary | ICD-10-CM

## 2024-06-10 PROCEDURE — 1160F RVW MEDS BY RX/DR IN RCRD: CPT | Performed by: INTERNAL MEDICINE

## 2024-06-10 PROCEDURE — 3075F SYST BP GE 130 - 139MM HG: CPT | Performed by: INTERNAL MEDICINE

## 2024-06-10 PROCEDURE — 99214 OFFICE O/P EST MOD 30 MIN: CPT | Performed by: INTERNAL MEDICINE

## 2024-06-10 PROCEDURE — 93005 ELECTROCARDIOGRAM TRACING: CPT | Performed by: INTERNAL MEDICINE

## 2024-06-10 PROCEDURE — 1036F TOBACCO NON-USER: CPT | Performed by: INTERNAL MEDICINE

## 2024-06-10 PROCEDURE — 1126F AMNT PAIN NOTED NONE PRSNT: CPT | Performed by: INTERNAL MEDICINE

## 2024-06-10 PROCEDURE — 1159F MED LIST DOCD IN RCRD: CPT | Performed by: INTERNAL MEDICINE

## 2024-06-10 PROCEDURE — 3079F DIAST BP 80-89 MM HG: CPT | Performed by: INTERNAL MEDICINE

## 2024-06-10 ASSESSMENT — PAIN SCALES - GENERAL: PAINLEVEL: 0-NO PAIN

## 2024-06-10 NOTE — PROGRESS NOTES
Subjective:  Patient returns for a routine 3-month follow-up.  When we last saw her, she was struggling with recurrent atrial fibrillation.  She was seen in consultation by EP.  Decision was made to initiate dofetilide which was successful in getting her back to normal sinus rhythm.  She generally feels quite good at this time.  She struggles some with her chronic dyspnea from her COPD, but she denies any angina.  She denies any palpitations or recurrent fatigue which was quite prominent when she was in atrial fibrillation.  She denies any bleeding problems despite good compliance with her Eliquis and aspirin.  She has not had any hospitalizations and denies any other new health concerns.    Of note, patient did have a remote catheterization back in 2013 which revealed some moderate LAD disease.  Of note, a nuclear stress test 2 years ago revealed a possible silent inferior apical MI but there was no ischemia.  Her ejection fraction appears to be mildly reduced.    Objective:  General: Alert, usual pleasant self.  HEENT: Unchanged.  Lungs: Diminished air exchange without crackles or wheezing.  Cardiac: Distant heart tones without change.  Abdomen: Nontender.  Extremities: No edema.  Skin: No rash.  Neuro: Grossly intact and unchanged.    EKG: Normal sinus rhythm.  Within normal limits.    Lipid panel: Cholesterol-133, HDL-55, LDL-62, TG-80.    Impression/plan:  Jazzy is generally doing quite nicely at this time.  Her heart rate and blood pressure remain under good control.  She is not having any clear angina, so I did not think we needed to embark on any more aggressive ischemic workup at this time given the possible prior silent MI.  We will continue with her current medical regimen for now.    I was delighted to see that she has improved quite nicely clinically with restoration of sinus rhythm with dofetilide therapy.    She is anticipating some potential upcoming dental work, so I did tell her it was fine to  hold her Eliquis for 2 to 3 days should the dentist desire this.  I will see her back for routine follow-up in 3 months but she knows to call for any setbacks that would suggest an earlier visit would be indicated.    Patient instructions:    Continue current medications unchanged.    Return to clinic in 3 months.    Call for any intercurrent problems.

## 2024-06-13 ENCOUNTER — PATIENT OUTREACH (OUTPATIENT)
Dept: CARE COORDINATION | Facility: CLINIC | Age: 79
End: 2024-06-13
Payer: MEDICARE

## 2024-06-15 LAB
ATRIAL RATE: 73 BPM
P AXIS: 17 DEGREES
P OFFSET: 207 MS
P ONSET: 147 MS
PR INTERVAL: 144 MS
Q ONSET: 219 MS
QRS COUNT: 12 BEATS
QRS DURATION: 88 MS
QT INTERVAL: 416 MS
QTC CALCULATION(BAZETT): 458 MS
QTC FREDERICIA: 444 MS
R AXIS: 35 DEGREES
T AXIS: 54 DEGREES
T OFFSET: 427 MS
VENTRICULAR RATE: 73 BPM

## 2024-06-17 ENCOUNTER — PATIENT OUTREACH (OUTPATIENT)
Dept: PRIMARY CARE | Facility: CLINIC | Age: 79
End: 2024-06-17
Payer: MEDICARE

## 2024-06-17 ENCOUNTER — TREATMENT (OUTPATIENT)
Dept: PHYSICAL THERAPY | Facility: CLINIC | Age: 79
End: 2024-06-17
Payer: MEDICARE

## 2024-06-17 DIAGNOSIS — I10 ESSENTIAL HYPERTENSION: ICD-10-CM

## 2024-06-17 DIAGNOSIS — M54.16 LUMBAR RADICULOPATHY: ICD-10-CM

## 2024-06-17 DIAGNOSIS — I48.91 ATRIAL FIBRILLATION, UNSPECIFIED TYPE (MULTI): ICD-10-CM

## 2024-06-17 DIAGNOSIS — M25.552 LEFT HIP PAIN: ICD-10-CM

## 2024-06-17 PROCEDURE — 97110 THERAPEUTIC EXERCISES: CPT | Mod: GP

## 2024-06-17 ASSESSMENT — PAIN SCALES - GENERAL: PAINLEVEL_OUTOF10: 6

## 2024-06-17 NOTE — PROGRESS NOTES
RN TONJA spoke with Jazzy, identified by name, shannan. At the time of outreach, she is with Physical Therapist, which is going well and is helpful. No immediate needs at this time. Treatment goals includes:   A balanced diet, rich in fruits and vegetables, low sodium, avoid processed foods. Regular exercise 3-4 days a week for at least 30 minutes or more if tolerates. BP of 120/80, no higher than 140/85 on consistent basis.   Jazzy was encouraged to call with questions/concerns

## 2024-06-17 NOTE — PROGRESS NOTES
"  Physical Therapy  Physical Therapy Treatment Note    Patient Name: Jazzy Schaeffer  MRN: [id number]  Today's Date: 2024  Time Calculation  Start Time: 1323  Stop Time: 1404  Time Calculation (min): 41 min    Insurance:  Visit number: 5 of 9  Authorization info: Auth needed  Insurance Type: [place] [employer]  5 Authorized 24 to 24; 4 Additional Authorized  to 2024  Medicare Certification Period: Beginnin24 Endin24  Onset date: 2023     General:  Reason for visit: L Hip Pain  Referred by: Christopher D'Amico, DO       Current Problem  1. Left hip pain  Follow Up In Physical Therapy      2. Lumbar radiculopathy  Follow Up In Physical Therapy          Precautions: REI Fall Risk Score (The score of 4 or more indicates an increased risk of falling): 11       Subjective:     Patient reports that the left hip is aggravated today.  Rates pain at 6/10 with some radiation to the knee and pulling near the left ankle.      Pain  Pain Score: 6    Performing HEP?: Yes      Objective:   Posture: FH, kyphosis, mildly flexed at waist. Able to correct with cueing     Gait: Mildly antalgic on LLE, decreased stride length, decreased stance time       Functional Movement  Sit to stand: Slow with UE reliance                                         Neuro:               Dermatomes: Intact to light touch       Treatment Performed:    Therapeutic Exercise:    36 min  R. Stepper L3 5'  Pelvic tilts 5\" x 20  Supine \"x3  Bridges 5\" hold 2x10  Supine TrA stabilization with leg fall-out 1'x2  Supine figure 4 hip stretch 1'x2 R/L  Single knee to chest stretch 30\"x 2 R/L  Hip Flexor stretch EOB 30\"x 2 R/L  Heel raises 3x10    Neuromuscular Re-education:  5 min  Airex '  Standing alternating hip abduction 1'x2      Personalized Home Exercise Program:  Access Code: [email or web address]  URL: [email or web address]  Date: 2024  Prepared by: Brittany Guzman   "   Exercises  - Supine Posterior Pelvic Tilt  - 1 x daily - 7 x weekly - 20 reps - 5 seconds hold  - Hooklying Single Knee to Chest Stretch  - 2 x daily - 7 x weekly - 3 reps - 30 seconds hold  - Beginner Bridge  - 1 x daily - 7 x weekly - 3 sets - 10 reps - 5 second hold  - Supine March  - 1 x daily - 7 x weekly - 3 sets - 10 reps        Access Code: [email or web address]  URL: [email or web address]  Date: 06/05/2024  Prepared by: Brittany Guzman     Exercises  - Forward Walking  - 2 x weekly - 3 minutes duration  - Backward Walking  - 2 x weekly - 3 minutes duration  - Side Stepping  - 2 x weekly - 5 minutes duration  - Standing Hip Abduction Adduction at Pool Wall  - 2 x weekly - 4 minute duration  - Standing Hip Flexion Extension at Pool Wall  - 2 x weekly - 4 minute duration  - Forward March  - 2 x weekly - 4 minutes duration  - Standing Hip Circles at Pool Wall  - 2 x weekly - 4 minute duration  - Standing Knee Flexion  - 2 x weekly - 4 minute duration  - Squat  - 2 x weekly - 4 minute duration  - Hip Flexor Stretch at Pool Wall  - 2 x weekly - 3 reps - 30 seconds hold  - Hamstring Stretch at Pool Wall  - 2 x weekly - 3 reps - 30  hold      Assessment:   Tightness with hip external rotation on left. Challenged with standing balance.  Pain centralized to left low back by the end of the session.       Plan:  Continue with a combination of aquatic and land based sessions x 4 sessions for core stabilization, hip ROM, LE strength and balance control to improve functional mobility.       Brittany Guzman, PT

## 2024-06-24 ENCOUNTER — TREATMENT (OUTPATIENT)
Dept: PHYSICAL THERAPY | Facility: CLINIC | Age: 79
End: 2024-06-24
Payer: MEDICARE

## 2024-06-24 DIAGNOSIS — M54.16 LUMBAR RADICULOPATHY: ICD-10-CM

## 2024-06-24 DIAGNOSIS — M25.552 LEFT HIP PAIN: Primary | ICD-10-CM

## 2024-06-24 PROCEDURE — 97113 AQUATIC THERAPY/EXERCISES: CPT | Mod: GP

## 2024-06-24 ASSESSMENT — PAIN SCALES - GENERAL: PAINLEVEL_OUTOF10: 5 - MODERATE PAIN

## 2024-06-24 NOTE — PROGRESS NOTES
Physical Therapy  Physical Therapy Treatment Note    Patient Name: Jazzy Schaeffer  MRN: [id number]  Today's Date: 2024  Time Calculation  Start Time: 1010  Stop Time: 1050  Time Calculation (min): 40 min    Insurance:  Visit number: 7 of 9  Authorization info: Auth needed  Insurance Type: [employer] [employer]  5 Authorized 24 to 24; 4 Additional Authorized  to 2024  Medicare Certification Period: Beginnin24 Endin24  Onset date: 2023     General:  Reason for visit: L Hip Pain  Referred by: Christopher D'Amico, DO       Current Problem  1. Left hip pain        2. Lumbar radiculopathy              Precautions: STEADI Fall Risk Score (The score of 4 or more indicates an increased risk of falling): 11       Subjective:     Patient reports that the pain is a 5/10 today in the left buttock and posterior thigh.  No numbness or tingling.  Felt pretty good after last session.     Pain  0-10 (Numeric) Pain Score: 5 - Moderate pain    Performing HEP?: Yes      Objective:   Posture: FH, kyphosis, mildly flexed at waist. Able to correct with cueing     Gait: Mildly antalgic on LLE, decreased stride length, decreased stance time       Functional Movement  Sit to stand: Slow with UE reliance                                         Neuro:               Dermatomes: Intact to light touch       Treatment Performed:    Water temp:  92 degrees    4 Foot Water Depth:   Ambulation with focus on upright posture:  Forward/backward  5'  Sidestepping 5'  Standing exercises at bar with abdominals engaged:  MIP: 4'  Alt Hip Abd/Add  3'  A/P LE Swings 3' R/L  LE circles CW/CCW 3' R/L  HS curls 3' R/L  Heel/toe raises 3'  Hip flexor stretch on step 1' R/L     Personalized Home Exercise Program:  Access Code: [email or web address]  URL: [email or web address]  Date: 2024  Prepared by: Brittany Guzman    Exercises  - Supine Posterior Pelvic Tilt  - 1 x daily - 7 x weekly - 20 reps - 5  seconds hold  - Hooklying Single Knee to Chest Stretch  - 2 x daily - 7 x weekly - 3 reps - 30 seconds hold  - Beginner Bridge  - 1 x daily - 7 x weekly - 3 sets - 10 reps - 5 second hold  - Supine March  - 1 x daily - 7 x weekly - 3 sets - 10 reps  - Supine Figure 4 Piriformis Stretch  - 1-2 x daily - 7 x weekly - 3 reps - 30 seconds hold  - Modified Rashad Stretch  - 2 x daily - 7 x weekly - 3 reps - 30 hold        Access Code: [email or web address]  URL: [email or web address]  Date: 06/05/2024  Prepared by: Brittany Guzman     Exercises  - Forward Walking  - 2 x weekly - 3 minutes duration  - Backward Walking  - 2 x weekly - 3 minutes duration  - Side Stepping  - 2 x weekly - 5 minutes duration  - Standing Hip Abduction Adduction at Pool Wall  - 2 x weekly - 4 minute duration  - Standing Hip Flexion Extension at Pool Wall  - 2 x weekly - 4 minute duration  - Forward March  - 2 x weekly - 4 minutes duration  - Standing Hip Circles at Pool Wall  - 2 x weekly - 4 minute duration  - Standing Knee Flexion  - 2 x weekly - 4 minute duration  - Squat  - 2 x weekly - 4 minute duration  - Hip Flexor Stretch at Pool Wall  - 2 x weekly - 3 reps - 30 seconds hold  - Hamstring Stretch at Pool Wall  - 2 x weekly - 3 reps - 30  hold      Assessment:   Pain reduced to 3/10 with aquatic therapy today and was able to stand with a noticeably more upright posture with improved stride length.       Plan:  Continue with a combination of aquatic and land based sessions x 2 sessions for core stabilization, hip ROM, LE strength and balance control to improve functional mobility.       Brittany Guzman, PT

## 2024-07-01 ENCOUNTER — TREATMENT (OUTPATIENT)
Dept: PHYSICAL THERAPY | Facility: CLINIC | Age: 79
End: 2024-07-01
Payer: MEDICARE

## 2024-07-01 ENCOUNTER — PATIENT OUTREACH (OUTPATIENT)
Dept: CARE COORDINATION | Facility: CLINIC | Age: 79
End: 2024-07-01
Payer: MEDICARE

## 2024-07-01 DIAGNOSIS — M54.16 LUMBAR RADICULOPATHY: ICD-10-CM

## 2024-07-01 DIAGNOSIS — M25.552 LEFT HIP PAIN: Primary | ICD-10-CM

## 2024-07-01 PROCEDURE — 97113 AQUATIC THERAPY/EXERCISES: CPT | Mod: GP

## 2024-07-01 ASSESSMENT — PAIN SCALES - GENERAL: PAINLEVEL_OUTOF10: 4

## 2024-07-01 NOTE — PROGRESS NOTES
Physical Therapy  Physical Therapy Treatment Note    Patient Name: Jazzy Schaeffer  MRN: [id number]  Today's Date: 2024  Time Calculation  Start Time: 1408  Stop Time: 1453  Time Calculation (min): 45 min    Insurance:  Visit number: 8 of 9  Authorization info: Auth needed  Insurance Type: [place] [employer]  5 Authorized 24 to 24; 4 Additional Authorized  to 2024  Medicare Certification Period: Beginnin24 Endin24  Onset date: 2023     General:  Reason for visit: L Hip Pain  Referred by: Christopher D'Amico, DO       Current Problem  1. Left hip pain        2. Lumbar radiculopathy                Precautions: ERI Fall Risk Score (The score of 4 or more indicates an increased risk of falling): 11       Subjective:     Patient reports that she stood a long time on Saturday, and she feels pretty sore in the left low back and buttock.  Has numbness and tingling in the left leg.  The left leg feels a little weak. Felt good after last session.     Pain  0-10 (Numeric) Pain Score: 4    Performing HEP?: Yes      Objective:   Posture: FH, kyphosis, mildly flexed at waist. Able to correct with cueing     Gait: Mildly antalgic on LLE, decreased stride length, decreased stance time       Functional Movement  Sit to stand: Slow with UE reliance                                         Neuro:               Dermatomes: Intact to light touch       Treatment Performed:    Water temp:  92 degrees    4 Foot Water Depth:   Ambulation with focus on upright posture:  Forward/backward  5'  Sidestepping 5'  Standing exercises at bar with abdominals engaged:  MIP: 4'  Alt Hip Abd/Add  3'  A/P LE Swings 3' R/L  LE circles CW/CCW 3' R/L  HS curls 3' R/L  Heel/toe raises 3'  Hip flexor stretch on step 1' R/L     Personalized Home Exercise Program:  Access Code: [email or web address]  URL: [email or web address]  Date: 2024  Prepared by: Brittany Guzman    Exercises  - Supine  Posterior Pelvic Tilt  - 1 x daily - 7 x weekly - 20 reps - 5 seconds hold  - Hooklying Single Knee to Chest Stretch  - 2 x daily - 7 x weekly - 3 reps - 30 seconds hold  - Beginner Bridge  - 1 x daily - 7 x weekly - 3 sets - 10 reps - 5 second hold  - Supine March  - 1 x daily - 7 x weekly - 3 sets - 10 reps  - Supine Figure 4 Piriformis Stretch  - 1-2 x daily - 7 x weekly - 3 reps - 30 seconds hold  - Modified Rashad Stretch  - 2 x daily - 7 x weekly - 3 reps - 30 hold        Access Code: [email or web address]  URL: [email or web address]  Date: 06/05/2024  Prepared by: Brittany Guzman     Exercises  - Forward Walking  - 2 x weekly - 3 minutes duration  - Backward Walking  - 2 x weekly - 3 minutes duration  - Side Stepping  - 2 x weekly - 5 minutes duration  - Standing Hip Abduction Adduction at Pool Wall  - 2 x weekly - 4 minute duration  - Standing Hip Flexion Extension at Pool Wall  - 2 x weekly - 4 minute duration  - Forward March  - 2 x weekly - 4 minutes duration  - Standing Hip Circles at Pool Wall  - 2 x weekly - 4 minute duration  - Standing Knee Flexion  - 2 x weekly - 4 minute duration  - Squat  - 2 x weekly - 4 minute duration  - Hip Flexor Stretch at Pool Wall  - 2 x weekly - 3 reps - 30 seconds hold  - Hamstring Stretch at Pool Wall  - 2 x weekly - 3 reps - 30  hold      Assessment:   Patient rated pain lower at 4/10 today, but seemed more uncomfortable than previous sessions.  Discussed options following completion of PT if symptoms not adequately managed with continuation of home exercises. Provided patient with profiles for orthopedists, Dr. Kearney and Dr. Wilburn.       Plan:  1 more session for recheck, core stabilization, hip ROM, LE strength and balance control to improve functional mobility.       Brittany Guzman, PT

## 2024-07-03 ENCOUNTER — TREATMENT (OUTPATIENT)
Dept: PHYSICAL THERAPY | Facility: CLINIC | Age: 79
End: 2024-07-03
Payer: MEDICARE

## 2024-07-03 DIAGNOSIS — M54.16 LUMBAR RADICULOPATHY: Primary | ICD-10-CM

## 2024-07-03 DIAGNOSIS — M25.552 LEFT HIP PAIN: ICD-10-CM

## 2024-07-03 PROCEDURE — 97110 THERAPEUTIC EXERCISES: CPT | Mod: GP

## 2024-07-03 ASSESSMENT — PAIN SCALES - GENERAL: PAINLEVEL_OUTOF10: 4

## 2024-07-03 NOTE — PROGRESS NOTES
Physical Therapy  Physical Therapy Progress Note and Discharge Note    Patient Name: Jazzy Schaeffer  MRN: [id number]  Today's Date: 7/3/2024  Time Calculation  Start Time: 1535  Stop Time: 1614  Time Calculation (min): 39 min    Insurance:  Visit number: 9 of 9  Authorization info: Auth needed  Insurance Type: [place] [employer]  5 Authorized 24 to 24; 4 Additional Authorized  to 2024  Medicare Certification Period: Beginnin24 Endin24  Onset date: 2023     General:  Reason for visit: L Hip Pain  Referred by: Christopher D'Amico, DO    Current Problem  1. Lumbar radiculopathy        2. Left hip pain              Precautions: ERI Fall Risk Score (The score of 4 or more indicates an increased risk of falling): 11       Subjective:     Patient reports 4/10 pain in L leg.  Pain is nagging and radiating down the posterior thigh.  The leg feels weak like it wants to give out.  Has not been using the cane as much, but still intermittently. Denies any new falls.     Notes that she thought she was improving, but symptoms have flared again recently.  Pain is most severe with sit-stand from a low chair.     Exacerbating Factors: Sit-stand, prolonged standing and walking    Functional Limitations: Difficulty with sit-stand, walking >24 min, Volunteering at [employer], Avoids stairs, Carrying laundry baskets on stairs    Self Reported Function (0-100%) = 40%  - 100% being back to PLOF      Pain  0-10 (Numeric) Pain Score: 4    Performing HEP?: Yes      Objective:   Posture: FH, kyphosis, mildly flexed at waist. Able to correct with cueing     Gait: Mildly antalgic on LLE, decreased stride length, decreased stance time                 Gait: Antalgic on LLE, decreased stride length, decreased stance time                    ROM     Lumbar AROM (%)     Flexion: 75%, Pain down to L knee, tingling in L buttock.  Pain with return to neutral  Extension: 25%,pain in back and L buttock    (R) Side Bend: 75%, pulling  (L) Side Bend: 50%, Increased pain LLE  (R) Rotation: 50%  (L) Rotation: 50%     Moderate unsteadiness with lumbar AROM testing     Hip AROM (Degrees)                                         (R)                    (L)  Flexion:                        103                   99, pain in buttock and left leg                                       Hip PROM (Degrees)                                         (R)                    (L)  Flexion:                        105                   108                                 Extension:                    5                      5                                                  Knee AROM (Degrees)                                         (R)                    (L)  Flexion:                        117                   108                                 Extension:                    0                      0                                                    Strength Testing     Hip                                      (R)                    (L)  Flexion:                        4                      4-                                    Extension:                    Decreased lift with bridge                     Abduction:                   4-                     3-                       Adduction:                   4+                    4                        ER:                                4                      3                              Knee                                      (R)                    (L)  Flexion:                        4+                     4                                   Extension:                    4+                     4-     Ankle                                      (R)                    (L)  Dorsiflexion:                5                      4                Core Control:  Pelvic Bridge: Decreased lift with bridge                                         Flexibility                          (R)           "          (L)  Hamstrings:                 NT                    NT  Hip Flexors:                  Mod decrease  Mod decrease                                         Functional Movement  Sit to stand: Slow with UE reliance  Bed Mobility: Independent, slow sit-supine                                         Balance     Single Leg Balance:      (R)10\"                 (L) 2\"        Neuro:               Dermatomes: Intact to light touch    Outcome survey:   LEFS 25/80 (initially 5/80)      Treatment Performed:  ALEXIA Mclean 8'  Performed recheck  Reviewed HEP.       Personalized Home Exercise Program:  Access Code: [email or web address]  URL: [email or web address]/  Date: 06/24/2024  Prepared by: Brittany Guzman     Exercises  - Supine Posterior Pelvic Tilt  - 1 x daily - 7 x weekly - 20 reps - 5 seconds hold  - Hooklying Single Knee to Chest Stretch  - 2 x daily - 7 x weekly - 3 reps - 30 seconds hold  - Beginner Bridge  - 1 x daily - 7 x weekly - 3 sets - 10 reps - 5 second hold  - Supine March  - 1 x daily - 7 x weekly - 3 sets - 10 reps  - Supine Figure 4 Piriformis Stretch  - 1-2 x daily - 7 x weekly - 3 reps - 30 seconds hold  - Modified Rashad Stretch  - 2 x daily - 7 x weekly - 3 reps - 30 hold        Access Code: [email or web address]  URL: [email or web address]/  Date: 06/05/2024  Prepared by: Brittany Guzman     Exercises  - Forward Walking  - 2 x weekly - 3 minutes duration  - Backward Walking  - 2 x weekly - 3 minutes duration  - Side Stepping  - 2 x weekly - 5 minutes duration  - Standing Hip Abduction Adduction at Pool Wall  - 2 x weekly - 4 minute duration  - Standing Hip Flexion Extension at Pool Wall  - 2 x weekly - 4 minute duration  - Forward March  - 2 x weekly - 4 minutes duration  - Standing Hip Circles at Pool Wall  - 2 x weekly - 4 minute duration  - Standing Knee Flexion  - 2 x weekly - 4 minute duration  - Squat  - 2 x weekly - 4 minute duration  - Hip Flexor Stretch at " Pool Wall  - 2 x weekly - 3 reps - 30 seconds hold  - Hamstring Stretch at Pool Wall  - 2 x weekly - 3 reps - 30  hold        Resolved       PT Problem       PT Goal 1 (Adequate for Discharge)       Start:  05/01/24    Expected End:  07/30/24    Resolved:  07/03/24    Lumbar ROM increased to WFL by week 12. Progressing  Stand 15 minutes with minimal difficulty by week 12. Met  Walk 15 minutes with minimal difficulty by week 12. Met  Reciprocal ascent/descent or stairs with use of railing by week 12. Not Met  LE functional strength improved as demonstrated by sit-stand with minimal difficulty by week 12. Not Met  LEFS score improved by at least 15 points by week 12. Progressing  Patient will rate pain < 4/10 at worst to improve ADL tolerance by week 12. Not Met  Independent with home exercise program for symptom reduction and functional gains by week 12.  Met              Assessment:   Ms. Schaeffer derived some benefit from physical therapy with less severe pain and improvements in ROM and strength.  However, she still has notable L radicular pain with LLE weakness and instability warranting seeing a spine specialist for additional assessment.  May also benefit from additional work-up of her left hip.     Plan:  Discharge from PT.  Patient plans on continuing with HEP (land and aquatic) and scheduling appointment with Dr. Kearney.       Brittany Guzman, PT

## 2024-07-05 ENCOUNTER — PATIENT OUTREACH (OUTPATIENT)
Dept: PRIMARY CARE | Facility: CLINIC | Age: 79
End: 2024-07-05
Payer: MEDICARE

## 2024-07-10 DIAGNOSIS — I48.91 ATRIAL FIBRILLATION (MULTI): ICD-10-CM

## 2024-07-10 RX ORDER — METOPROLOL SUCCINATE 25 MG/1
TABLET, EXTENDED RELEASE ORAL
Qty: 90 TABLET | Refills: 1 | Status: SHIPPED | OUTPATIENT
Start: 2024-07-10

## 2024-07-15 ENCOUNTER — PATIENT OUTREACH (OUTPATIENT)
Dept: PRIMARY CARE | Facility: CLINIC | Age: 79
End: 2024-07-15
Payer: MEDICARE

## 2024-07-15 DIAGNOSIS — I48.91 ATRIAL FIBRILLATION, UNSPECIFIED TYPE (MULTI): ICD-10-CM

## 2024-07-15 DIAGNOSIS — I10 ESSENTIAL HYPERTENSION: ICD-10-CM

## 2024-07-15 NOTE — PROGRESS NOTES
RN TONJA spoke with Jazzy, identified by name, . She reports left hip arthritic pain and plan to use Biofreeze for relief. Physical Therapy sessions has ended. Jazzy continues exercises recommended by [facility]. She inquired about community resources where she can continue with self therapy. Information was given for [facility] [phone]. Medications, PCP appointment reviewed and discussed. Bleeding precautions discussed regarding Eliquis and pain medications such as Advil/Motrin. She is encouraged to monitor blood pressure at home and keeping log. Questions answered.     Treatment goals include: A balanced diet, rich in fruits and vegetables, whole grains, low fat, low sodium foods. Avoid processed foods. Regular exercise 3-4 days a week for at least 30 minutes or more if tolerates. BP of 120/80, no higher than 140/85 on consistent basis.     Jazzy was encouraged to call with questions/concerns

## 2024-07-30 NOTE — PROGRESS NOTES
Jazzy Schaeffer female   1945 78 y.o.   [id number]      Chief Complaint  Annual mammogram and exam, history of left breast cancer    History Of Present Illness  Jazzy Schaeffer is a 78 year old AA female who is status post left breast mastectomy on 19. She had a left breast excisional biopsy on 19 for a core biopsy demonstrating atypical ductal hyperplasia. 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 She opted for a left breast completion mastectomy due to a previous lumpectomy and sentinel node biopsy with post-operative radiation therapy 30+ year prior. She presents today for annual mammogram and exam. She denies any new masses or lumps. She does have a frozen shoulder.  Stage 0 pTis    BREAST IMAGIN/10/2023 Right diagnostic mammogram, indicates BI-RADS Category 2.     REPRODUCTIVE HISTORY: menarche age 14, , first birth age 30,  x 5 months, OCP's very short time, menopause age, HRT x 1 month, scattered fibroglandular tissue    FAMILY CANCER HISTORY:   Sister (1): Breast cancer, 20s  Niece (1): Breast cancer  Niece (2): Breast cancer  Paternal Aunt: Breast cancer  Mother: Lymphoma  Father: Bladder cancer  Sister (2): Colon cancer  Brother: Kidney cancer    Surgical History  She has a past surgical history that includes Breast lumpectomy (2014); Colonoscopy (2015); Other surgical history (2017); Colon surgery (2017); BI mammo guided breast left localization (Left, 2019); CT angio coronary art with heartflow if score >30% (2020); MR angio head wo IV contrast (3/30/2022); MR angio neck wo IV contrast (3/30/2022); CT angio head w and wo IV contrast (3/31/2022); CT angio neck (3/31/2022); and BI mammo guided breast right localization (Right, 11/3/2017).     Social History  She reports that she quit smoking about 2 years ago. Her smoking use included cigarettes. She has never used smokeless tobacco.  She reports that she does not drink alcohol and does not use drugs.    Family History  No family history on file.     Allergies  Mushroom    Medications  Current Outpatient Medications   Medication Instructions    amLODIPine (NORVASC) 5 mg, oral, Daily    aspirin 81 mg EC tablet 1 tablet, oral, Daily    cholecalciferol (VITAMIN D-3) 5,000 Units, oral, Daily    cyanocobalamin (VITAMIN B-12) 1,000 mcg, oral, Daily    dofetilide (TIKOSYN) 250 mcg, oral, Every 12 hours    Eliquis 5 mg, oral, Every 12 hours    fluticasone (Flonase) 50 mcg/actuation nasal spray 1 spray, Each Nostril, Daily PRN, Shake gently. Before first use, prime pump. After use, clean tip and replace cap.    furosemide (LASIX) 20 mg, oral, Daily    losartan (COZAAR) 25 mg, oral, Daily    magnesium oxide (MAG-OX) 400 mg, oral, Daily    metoprolol succinate XL (Toprol-XL) 25 mg 24 hr tablet TAKE 1 TABLET(25 MG) BY MOUTH DAILY IN THE MORNING. DO NOT. START BEFORE APRIL 5, 2024    multivitamin tablet 1 tablet, oral, Daily    nitroglycerin (NITROSTAT) 0.4 mg, sublingual, Every 5 min PRN    rosuvastatin (CRESTOR) 5 mg, oral, Nightly         REVIEW OF SYSTEMS    Constitutional:  Negative for appetite change, fatigue, fever and unexpected weight change.   HENT:  Negative for ear pain, hearing loss, nosebleeds, sore throat and trouble swallowing.    Eyes:  Negative for discharge, itching and visual disturbance.   Respiratory:  Negative for cough, chest tightness and shortness of breath.    Cardiovascular:  Negative for chest pain, palpitations and leg swelling.   Breast: as indicated in HPI  Gastrointestinal:  Negative for abdominal pain, constipation, diarrhea and nausea.   Endocrine: Negative for cold intolerance and heat intolerance.   Genitourinary:  Negative for dysuria, frequency, hematuria, pelvic pain and vaginal bleeding.   Musculoskeletal:  Negative for arthralgias, back pain, gait problem, joint swelling and myalgias.   Skin:  Negative for color  change and rash.   Allergic/Immunologic: Negative for environmental allergies and food allergies.   Neurological:  Negative for dizziness, tremors, speech difficulty, weakness, numbness and headaches.   Hematological:  Does not bruise/bleed easily.   Psychiatric/Behavioral:  Negative for agitation, dysphoric mood and sleep disturbance. The patient is not nervous/anxious.         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     Physical Exam  Patient is alert and oriented x3 and in a relaxed and appropriate mood. Her gait is steady and hand grasps are equal. Sclera is clear. The breasts are nearly symmetrical. The tissue is soft without palpable abnormalities, discrete nodules or masses. The skin and nipples appear normal. There is no cervical, supraclavicular or axillary lymphadenopathy. Heart rate and rhythm normal, S1 and S2 appreciated. The lungs are clear to auscultation bilaterally. Abdomen is soft and non-tender.       Physical Exam     Last Recorded Vitals  There were no vitals filed for this visit.    Relevant Results   Time was spent viewing digital images of the radiology testing with the patient. I explained the results in depth, along with suggested explanation for follow up recommendations based on the testing results. BI-RADS Category     Imaging    Assessment/Plan   Normal clinical exam and imaging, history of left DCIS with recurrence, s/p left mastectomy without reconstruction, family history of breast cancer, scattered fibroglandular tissue    Plan: Return in one year for right screening mammogram and office visit.     Patient Discussion/Summary  Your clinical examination and imaging are normal. Please return in one year for right screening  mammogram and office visit or sooner if you have any problems or concerns.     You can see your health information, review clinical summaries from office visits & test results online when you follow your health with MY  Chart, a personal health record. To sign up go to [email or web address]. If you need assistance with signing up or trouble getting into your account call [employer] Patient Line 24/7 at [phone].    My office phone number is [phone] if you need to get in touch with me or have additional questions or concerns. Thank you for choosing [facility] and trusting me as your healthcare provider. I look forward to seeing you again at your next office visit. I am honored to be a provider on your health care team and I remain dedicated to helping you achieve your health goals.      Staci Buchanan, APRN-CNP

## 2024-08-06 ENCOUNTER — LAB (OUTPATIENT)
Dept: LAB | Facility: LAB | Age: 79
End: 2024-08-06
Payer: MEDICARE

## 2024-08-06 ENCOUNTER — HOSPITAL ENCOUNTER (OUTPATIENT)
Dept: RADIOLOGY | Facility: CLINIC | Age: 79
Discharge: HOME | End: 2024-08-06
Payer: MEDICARE

## 2024-08-06 ENCOUNTER — APPOINTMENT (OUTPATIENT)
Dept: PRIMARY CARE | Facility: CLINIC | Age: 79
End: 2024-08-06
Payer: MEDICARE

## 2024-08-06 VITALS
SYSTOLIC BLOOD PRESSURE: 136 MMHG | BODY MASS INDEX: 31.82 KG/M2 | HEART RATE: 69 BPM | DIASTOLIC BLOOD PRESSURE: 81 MMHG | OXYGEN SATURATION: 98 % | HEIGHT: 65 IN | WEIGHT: 191 LBS

## 2024-08-06 DIAGNOSIS — Z00.00 WELLNESS EXAMINATION: ICD-10-CM

## 2024-08-06 DIAGNOSIS — Z00.00 MEDICARE ANNUAL WELLNESS VISIT, SUBSEQUENT: ICD-10-CM

## 2024-08-06 DIAGNOSIS — I48.91 ATRIAL FIBRILLATION, UNSPECIFIED TYPE (MULTI): ICD-10-CM

## 2024-08-06 DIAGNOSIS — Z78.0 ASYMPTOMATIC MENOPAUSAL STATE: ICD-10-CM

## 2024-08-06 DIAGNOSIS — M54.2 NECK PAIN: ICD-10-CM

## 2024-08-06 DIAGNOSIS — M54.40 CHRONIC LOW BACK PAIN WITH SCIATICA, SCIATICA LATERALITY UNSPECIFIED, UNSPECIFIED BACK PAIN LATERALITY: ICD-10-CM

## 2024-08-06 DIAGNOSIS — I48.92 ATRIAL FLUTTER, UNSPECIFIED TYPE (MULTI): ICD-10-CM

## 2024-08-06 DIAGNOSIS — G89.29 CHRONIC LOW BACK PAIN WITH SCIATICA, SCIATICA LATERALITY UNSPECIFIED, UNSPECIFIED BACK PAIN LATERALITY: ICD-10-CM

## 2024-08-06 DIAGNOSIS — Z12.31 ENCOUNTER FOR SCREENING MAMMOGRAM FOR MALIGNANT NEOPLASM OF BREAST: ICD-10-CM

## 2024-08-06 DIAGNOSIS — I10 ESSENTIAL HYPERTENSION: ICD-10-CM

## 2024-08-06 DIAGNOSIS — M25.552 LEFT HIP PAIN: ICD-10-CM

## 2024-08-06 DIAGNOSIS — F17.210 CIGARETTE NICOTINE DEPENDENCE WITHOUT COMPLICATION: ICD-10-CM

## 2024-08-06 DIAGNOSIS — E55.9 VITAMIN D DEFICIENCY: ICD-10-CM

## 2024-08-06 DIAGNOSIS — R42 LIGHT HEADEDNESS: ICD-10-CM

## 2024-08-06 DIAGNOSIS — I10 ESSENTIAL HYPERTENSION: Primary | ICD-10-CM

## 2024-08-06 LAB
25(OH)D3 SERPL-MCNC: 33 NG/ML (ref 30–100)
ALBUMIN SERPL BCP-MCNC: 4.2 G/DL (ref 3.4–5)
ALP SERPL-CCNC: 82 U/L (ref 33–136)
ALT SERPL W P-5'-P-CCNC: 11 U/L (ref 7–45)
ANION GAP SERPL CALC-SCNC: 13 MMOL/L (ref 10–20)
AST SERPL W P-5'-P-CCNC: 18 U/L (ref 9–39)
BILIRUB SERPL-MCNC: 0.6 MG/DL (ref 0–1.2)
BUN SERPL-MCNC: 16 MG/DL (ref 6–23)
CALCIUM SERPL-MCNC: 9.8 MG/DL (ref 8.6–10.6)
CHLORIDE SERPL-SCNC: 104 MMOL/L (ref 98–107)
CHOLEST SERPL-MCNC: 128 MG/DL (ref 0–199)
CHOLESTEROL/HDL RATIO: 2.4
CO2 SERPL-SCNC: 29 MMOL/L (ref 21–32)
CREAT SERPL-MCNC: 0.93 MG/DL (ref 0.5–1.05)
EGFRCR SERPLBLD CKD-EPI 2021: 64 ML/MIN/1.73M*2
ERYTHROCYTE [DISTWIDTH] IN BLOOD BY AUTOMATED COUNT: 15 % (ref 11.5–14.5)
GLUCOSE SERPL-MCNC: 94 MG/DL (ref 74–99)
HCT VFR BLD AUTO: 42.5 % (ref 36–46)
HDLC SERPL-MCNC: 53.6 MG/DL
HGB BLD-MCNC: 12.8 G/DL (ref 12–16)
LDLC SERPL CALC-MCNC: 59 MG/DL
MCH RBC QN AUTO: 25.5 PG (ref 26–34)
MCHC RBC AUTO-ENTMCNC: 30.1 G/DL (ref 32–36)
MCV RBC AUTO: 85 FL (ref 80–100)
NON HDL CHOLESTEROL: 74 MG/DL (ref 0–149)
NRBC BLD-RTO: 0 /100 WBCS (ref 0–0)
PLATELET # BLD AUTO: 265 X10*3/UL (ref 150–450)
POTASSIUM SERPL-SCNC: 4.2 MMOL/L (ref 3.5–5.3)
PROT SERPL-MCNC: 7.2 G/DL (ref 6.4–8.2)
RBC # BLD AUTO: 5.02 X10*6/UL (ref 4–5.2)
SODIUM SERPL-SCNC: 142 MMOL/L (ref 136–145)
TRIGL SERPL-MCNC: 77 MG/DL (ref 0–149)
TSH SERPL-ACNC: 1.36 MIU/L (ref 0.44–3.98)
VLDL: 15 MG/DL (ref 0–40)
WBC # BLD AUTO: 5.9 X10*3/UL (ref 4.4–11.3)

## 2024-08-06 PROCEDURE — 84443 ASSAY THYROID STIM HORMONE: CPT

## 2024-08-06 PROCEDURE — 82306 VITAMIN D 25 HYDROXY: CPT

## 2024-08-06 PROCEDURE — 36415 COLL VENOUS BLD VENIPUNCTURE: CPT

## 2024-08-06 PROCEDURE — 1036F TOBACCO NON-USER: CPT | Performed by: STUDENT IN AN ORGANIZED HEALTH CARE EDUCATION/TRAINING PROGRAM

## 2024-08-06 PROCEDURE — 1170F FXNL STATUS ASSESSED: CPT | Performed by: STUDENT IN AN ORGANIZED HEALTH CARE EDUCATION/TRAINING PROGRAM

## 2024-08-06 PROCEDURE — 1159F MED LIST DOCD IN RCRD: CPT | Performed by: STUDENT IN AN ORGANIZED HEALTH CARE EDUCATION/TRAINING PROGRAM

## 2024-08-06 PROCEDURE — 1124F ACP DISCUSS-NO DSCNMKR DOCD: CPT | Performed by: STUDENT IN AN ORGANIZED HEALTH CARE EDUCATION/TRAINING PROGRAM

## 2024-08-06 PROCEDURE — 3075F SYST BP GE 130 - 139MM HG: CPT | Performed by: STUDENT IN AN ORGANIZED HEALTH CARE EDUCATION/TRAINING PROGRAM

## 2024-08-06 PROCEDURE — G0439 PPPS, SUBSEQ VISIT: HCPCS | Performed by: STUDENT IN AN ORGANIZED HEALTH CARE EDUCATION/TRAINING PROGRAM

## 2024-08-06 PROCEDURE — 85027 COMPLETE CBC AUTOMATED: CPT

## 2024-08-06 PROCEDURE — 3079F DIAST BP 80-89 MM HG: CPT | Performed by: STUDENT IN AN ORGANIZED HEALTH CARE EDUCATION/TRAINING PROGRAM

## 2024-08-06 PROCEDURE — 99397 PER PM REEVAL EST PAT 65+ YR: CPT | Performed by: STUDENT IN AN ORGANIZED HEALTH CARE EDUCATION/TRAINING PROGRAM

## 2024-08-06 PROCEDURE — 80061 LIPID PANEL: CPT

## 2024-08-06 PROCEDURE — 99214 OFFICE O/P EST MOD 30 MIN: CPT | Performed by: STUDENT IN AN ORGANIZED HEALTH CARE EDUCATION/TRAINING PROGRAM

## 2024-08-06 PROCEDURE — 80053 COMPREHEN METABOLIC PANEL: CPT

## 2024-08-06 PROCEDURE — 1160F RVW MEDS BY RX/DR IN RCRD: CPT | Performed by: STUDENT IN AN ORGANIZED HEALTH CARE EDUCATION/TRAINING PROGRAM

## 2024-08-06 ASSESSMENT — ACTIVITIES OF DAILY LIVING (ADL)
TAKING_MEDICATION: INDEPENDENT
DOING_HOUSEWORK: INDEPENDENT
DRESSING: INDEPENDENT
GROCERY_SHOPPING: INDEPENDENT
MANAGING_FINANCES: INDEPENDENT
BATHING: INDEPENDENT

## 2024-08-06 ASSESSMENT — ENCOUNTER SYMPTOMS
DEPRESSION: 0
LOSS OF SENSATION IN FEET: 0
OCCASIONAL FEELINGS OF UNSTEADINESS: 0

## 2024-08-06 NOTE — PROGRESS NOTES
HPI: 78-year-old female present for follow-up on multiple concerns, Medicare annual wellness exam/CPE.    A-fib a flutter, HTN  Stable, tolerates current regimen well.  Following with general cardiology and EP.    Lightheadedness  No changes since last appointment, was feeling lightheaded and dizzy most days.  No presyncopal/syncopal episodes.  Symptoms present even when sitting down.    Low back pain with sciatica, hip pain, neck pain  X-rays show arthritis throughout the hip and back, did well with aqua therapy, but symptoms have been returning since discontinuation.  History of cervical surgery in the past.    SocHx:   - Smoking: About 55 pack years, quit a year and a half ago    Denies HA, changes in vision, chest pain, SOB/MARION, palpitations, N/V/D/C, dysuria/hematuria, hematochezia/melena, paresthesias, LE edema.    12 point ROS reviewed and negative other than as stated in HPI      General: Alert, oriented, pleasant, in no acute distress  HEENT:      Head: normocephalic, atraumatic;      eyes: EOMI, no scleral icterus;   Neck: soft, supple, non-tender, no masses appreciated  CV: Heart with regular rate and rhythm, normal S1/S2, no murmurs  Lungs: CTAB without wheezing, rhonchi or rales; good respiratory effort, no increased work of breathing  Abdomen: soft, non-tender, non-distended, no masses appreciated  Extremities: no edema, no cyanosis  Neuro: Cranial nerves grossly intact; alert and oriented  Psych: Appropriate mood and affect    # HM  -CBC, CMP, Lipid panel, Vit D, TSH with reflex T4  -Vaccines:       Flu: Out of season      Shingrix: Recommended, advised to go to local pharmacy      Pneumococcal: UTD      Tdap: Recommended, advised to go to local pharmacy  -[facility] w/ ashish: Pending from GYN  -Lung cancer screening with low-dose CT: 55 pack years, quit 1.5 years ago, screening ordered  -Colonoscopy: 2023, 3-year plan  -Osteoporosis screening: Ordered    #A-fib #atrial flutter #HTN  -Blood pressure  acceptable in office  - Sinus rhythm on auscultation  - Continue current regimen through EP  - Continue amlodipine 5 mg daily, losartan 25 mg daily, metoprolol succinate 25 mg daily    #Lightheadedness  - Unclear etiology, seemingly stable  -Was advised to discuss with cardiology, though it does not seem cardiogenic given good blood pressure, normal heart rhythm, lack of positional correlation  -Will refer to neurology    #L hip pain #low back pain #neck pain  -Hip x-ray and lumbar shows degenerative changes  -X-ray cervical spine  - Physical therapy referral    F/U 4-6 months, sooner if indicated    Chris D'Amico, DO

## 2024-08-07 ENCOUNTER — PATIENT OUTREACH (OUTPATIENT)
Dept: PRIMARY CARE | Facility: CLINIC | Age: 79
End: 2024-08-07
Payer: MEDICARE

## 2024-08-07 DIAGNOSIS — I10 ESSENTIAL HYPERTENSION: ICD-10-CM

## 2024-08-07 DIAGNOSIS — I48.91 ATRIAL FIBRILLATION, UNSPECIFIED TYPE (MULTI): ICD-10-CM

## 2024-08-07 NOTE — PROGRESS NOTES
MICHELLE EVANGELISTA spoke with Jazzy, identified by name, shannan. She has been experiencing vertigo. She denies falls, nausea, vomiting, fever. MICHELLE EVANGELISTA encouraged Jazzy to call for an appointment for Neurologist as referred by PCP. Medications, upcoming appointments, treatment goals reviewed with Jazzy, she verbalized understanding. Questions answered.     Treatment goals include: A balanced diet, rich in fruits and vegetables, low sodium, avoid processed foods. Regular exercise 3-4 days a week for at least 30 minutes or more if tolerates. BP of 120/80, no higher than 140/85 on consistent basis.     Jazzy was encouraged to call with questions/concerns

## 2024-08-08 ENCOUNTER — APPOINTMENT (OUTPATIENT)
Dept: SURGICAL ONCOLOGY | Facility: CLINIC | Age: 79
End: 2024-08-08
Payer: MEDICARE

## 2024-08-08 ENCOUNTER — APPOINTMENT (OUTPATIENT)
Dept: RADIOLOGY | Facility: CLINIC | Age: 79
End: 2024-08-08
Payer: MEDICARE

## 2024-08-14 ENCOUNTER — TELEPHONE (OUTPATIENT)
Dept: PRIMARY CARE | Facility: CLINIC | Age: 79
End: 2024-08-14
Payer: MEDICARE

## 2024-08-14 NOTE — TELEPHONE ENCOUNTER
----- Message from Christopher D'Amico sent at 8/11/2024  7:02 PM EDT -----  Labs essentially unremarkable.

## 2024-08-14 NOTE — TELEPHONE ENCOUNTER
Result Communication    Resulted Orders   CBC   Result Value Ref Range    WBC 5.9 4.4 - 11.3 x10*3/uL    nRBC 0.0 0.0 - 0.0 /100 WBCs    RBC 5.02 4.00 - 5.20 x10*6/uL    Hemoglobin 12.8 12.0 - 16.0 g/dL    Hematocrit 42.5 36.0 - 46.0 %    MCV 85 80 - 100 fL    MCH 25.5 (L) 26.0 - 34.0 pg    MCHC 30.1 (L) 32.0 - 36.0 g/dL    RDW 15.0 (H) 11.5 - 14.5 %    Platelets 265 150 - 450 x10*3/uL   Lipid Panel   Result Value Ref Range    Cholesterol 128 0 - 199 mg/dL      Comment:            Age      Desirable   Borderline High   High     0-19 Y     0 - 169       170 - 199     >/= 200    20-24 Y     0 - 189       190 - 224     >/= 225         >24 Y     0 - 199       200 - 239     >/= 240   **All ranges are based on fasting samples. Specific   therapeutic targets will vary based on patient-specific   cardiac risk.    Pediatric guidelines reference:Pediatrics 2011, 128(S5).Adult guidelines reference: NCEP ATPIII Guidelines,JOÃO 2001, 258:2486-97    Venipuncture immediately after or during the administration of Metamizole may lead to falsely low results. Testing should be performed immediately prior to Metamizole dosing.    HDL-Cholesterol 53.6 mg/dL      Comment:        Age       Very Low   Low     Normal    High    0-19 Y    < 35      < 40     40-45     ----  20-24 Y    ----     < 40      >45      ----        >24 Y      ----     < 40     40-60      >60      Cholesterol/HDL Ratio 2.4       Comment:        Ref Values  Desirable  < 3.4  High Risk  > 5.0    LDL Calculated 59 <=99 mg/dL      Comment:                                  Near   Borderline      AGE      Desirable  Optimal    High     High     Very High     0-19 Y     0 - 109     ---    110-129   >/= 130     ----    20-24 Y     0 - 119     ---    120-159   >/= 160     ----      >24 Y     0 -  99   100-129  130-159   160-189     >/=190      VLDL 15 0 - 40 mg/dL    Triglycerides 77 0 - 149 mg/dL      Comment:         Age         Desirable   Borderline High   High     Very  High   0 D-90 D    19 - 174         ----         ----        ----  91 D- 9 Y     0 -  74        75 -  99     >/= 100      ----    10-19 Y     0 -  89        90 - 129     >/= 130      ----    20-24 Y     0 - 114       115 - 149     >/= 150      ----         >24 Y     0 - 149       150 - 199    200- 499    >/= 500    Venipuncture immediately after or during the administration of Metamizole may lead to falsely low results. Testing should be performed immediately prior to Metamizole dosing.    Non HDL Cholesterol 74 0 - 149 mg/dL      Comment:            Age       Desirable   Borderline High   High     Very High     0-19 Y     0 - 119       120 - 144     >/= 145    >/= 160    20-24 Y     0 - 149       150 - 189     >/= 190      ----         >24 Y    30 mg/dL above LDL Cholesterol goal     Comprehensive Metabolic Panel   Result Value Ref Range    Glucose 94 74 - 99 mg/dL    Sodium 142 136 - 145 mmol/L    Potassium 4.2 3.5 - 5.3 mmol/L    Chloride 104 98 - 107 mmol/L    Bicarbonate 29 21 - 32 mmol/L    Anion Gap 13 10 - 20 mmol/L    Urea Nitrogen 16 6 - 23 mg/dL    Creatinine 0.93 0.50 - 1.05 mg/dL    eGFR 64 >60 mL/min/1.73m*2      Comment:      Calculations of estimated GFR are performed using the 2021 CKD-EPI Study Refit equation without the race variable for the IDMS-Traceable creatinine methods.  https://jasn.asnjournals.org/content/early/2021/09/22/ASN.2735207368    Calcium 9.8 8.6 - 10.6 mg/dL    Albumin 4.2 3.4 - 5.0 g/dL    Alkaline Phosphatase 82 33 - 136 U/L    Total Protein 7.2 6.4 - 8.2 g/dL    AST 18 9 - 39 U/L    Bilirubin, Total 0.6 0.0 - 1.2 mg/dL    ALT 11 7 - 45 U/L      Comment:      Patients treated with Sulfasalazine may generate falsely decreased results for ALT.   TSH with reflex to Free T4 if abnormal   Result Value Ref Range    Thyroid Stimulating Hormone 1.36 0.44 - 3.98 mIU/L    Narrative    TSH testing is performed using different testing methodology at [facility] than at other  [facility]. Direct result comparisons should only be made within the same method.     Vitamin D 25-Hydroxy,Total (for eval of Vitamin D levels)   Result Value Ref Range    Vitamin D, 25-Hydroxy, Total 33 30 - 100 ng/mL    Narrative    Deficiency:         < 20   ng/ml  Insufficiency:      20-29  ng/ml  Sufficiency:         ng/ml  This assay accurately quantifies the sum of Vitamin D3, 25-Hydroxy and Vitamin D2,25-Hydroxy.       2:38 PM      Results were successfully communicated with the patient and they acknowledged their understanding.

## 2024-08-27 NOTE — PROGRESS NOTES
Jazzy Schaeffer female   1945 79 y.o.   [id number]      Chief Complaint  Annual mammogram and exam, history of left breast cancer    History Of Present Illness  Jazzy Schaeffer is a very pleasant 79 year old AA female who is status post left breast mastectomy on 19. She had a left breast excisional biopsy on 19 for a core biopsy demonstrating atypical ductal hyperplasia. 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 She opted for a left breast completion mastectomy due to a previous lumpectomy and sentinel node biopsy with post-operative radiation therapy 30+ year prior. She has a remote history of right breast benign excisional biopsy several years ago. She presents today for annual mammogram and exam. She denies any new masses or lumps. She does have a left frozen shoulder causing some numbness. She is able to raise her left arm.   Stage 0 pTis    BREAST IMAGIN/10/2023 Right diagnostic mammogram, indicates BI-RADS Category 2.     REPRODUCTIVE HISTORY: menarche age 14, , first birth age 30,  x 5 months, OCP's very short time, menopause age unknown (hysterectomy age 31 due to abnormal bleeding), no HRT, scattered fibroglandular tissue    FAMILY CANCER HISTORY:   Sister (1): Breast cancer, 20s  Niece (1): Breast cancer  Niece (2): Breast cancer  Paternal Aunt: Breast cancer  Mother: Lymphoma  Father: Bladder cancer  Sister (2): Colon cancer  Brother: Kidney cancer    Surgical History  She has a past surgical history that includes Breast lumpectomy (Left, 2014); Colonoscopy (2015); Other surgical history (2017); Colon surgery (2017); BI mammo guided breast left localization (Left, 2019); CT angio coronary art with heartflow if score >30% (2020); MR angio head wo IV contrast (2022); MR angio neck wo IV contrast (2022); CT angio head w and wo IV contrast (2022); CT angio neck  (03/31/2022); BI mammo guided breast right localization (Right, 11/03/2017); and Mastectomy.     Social History  She reports that she quit smoking about 2 years ago. Her smoking use included cigarettes. She has never used smokeless tobacco. She reports that she does not drink alcohol and does not use drugs.    Family History  Family History   Problem Relation Name Age of Onset    Breast cancer Sister      Breast cancer Father's Sister      Breast cancer Niece          Allergies  Mushroom    Medications  Current Outpatient Medications   Medication Instructions    amLODIPine (NORVASC) 5 mg, oral, Daily    aspirin 81 mg EC tablet 1 tablet, oral, Daily    cholecalciferol (VITAMIN D-3) 5,000 Units, oral, Daily    cyanocobalamin (VITAMIN B-12) 1,000 mcg, oral, Daily    dofetilide (TIKOSYN) 250 mcg, oral, Every 12 hours    Eliquis 5 mg, oral, Every 12 hours    fluticasone (Flonase) 50 mcg/actuation nasal spray 1 spray, Each Nostril, Daily PRN, Shake gently. Before first use, prime pump. After use, clean tip and replace cap.    furosemide (LASIX) 20 mg, oral, Daily    losartan (COZAAR) 25 mg, oral, Daily    magnesium oxide (MAG-OX) 400 mg, oral, Daily    metoprolol succinate XL (Toprol-XL) 25 mg 24 hr tablet TAKE 1 TABLET(25 MG) BY MOUTH DAILY IN THE MORNING. DO NOT. START BEFORE APRIL 5, 2024    multivitamin tablet 1 tablet, oral, Daily    nitroglycerin (NITROSTAT) 0.4 mg, sublingual, Every 5 min PRN    rosuvastatin (CRESTOR) 5 mg, oral, Nightly         REVIEW OF SYSTEMS    Constitutional:  Negative for appetite change, fatigue, fever and unexpected weight change.   HENT:  Negative for ear pain, hearing loss, nosebleeds, sore throat and trouble swallowing.    Eyes:  Negative for discharge, itching and visual disturbance.   Respiratory:  Negative for cough, chest tightness and shortness of breath.    Cardiovascular:  Negative for chest pain, palpitations and leg swelling.   Breast: as indicated in HPI  Gastrointestinal:   Negative for abdominal pain, constipation, diarrhea and nausea.   Endocrine: Negative for cold intolerance and heat intolerance.   Genitourinary:  Negative for dysuria, frequency, hematuria, pelvic pain and vaginal bleeding.   Musculoskeletal:  Negative for arthralgias, back pain, gait problem, joint swelling and myalgias.   Skin:  Negative for color change and rash.   Allergic/Immunologic: Negative for environmental allergies and food allergies.   Neurological:  Negative for dizziness, tremors, speech difficulty, weakness, numbness and headaches.   Hematological:  Does not bruise/bleed easily.   Psychiatric/Behavioral:  Negative for agitation, dysphoric mood and sleep disturbance. The patient is not nervous/anxious.         Past Medical History  She has a past medical history of Adhesive capsulitis of left shoulder (08/04/2022), Benign neoplasm of descending colon (04/04/2023), Breast cancer (Multi), Congenital hiatus hernia (03/20/2024), Gastroduodenitis (03/20/2024), antineoplastic chemo, Numbness of face (03/20/2024), Personal history of irradiation, and Personal history of other benign neoplasm.     Physical Exam  Patient is alert and oriented x3 and in a relaxed and appropriate mood. Her gait is steady and hand grasps are equal. Sclera is clear. The breasts are asymmetrical. The left breast and nipple have been removed with a well-healed mastectomy incision. The left axilla has a well-healed incision. The overlying tissue is soft without palpable abnormalities, discrete nodules or masses. There is palpable bone at incision line. The right breast has a well-healed excisional biopsy incision, central lateral quadrant. The right breast tissue is soft without palpable abnormalities, discrete nodules or masses. The skin and right nipple appear normal. There is no cervical, supraclavicular or axillary lymphadenopathy. Heart rate and rhythm normal, S1 and S2 appreciated. The lungs are clear to auscultation  bilaterally. Abdomen is soft and non-tender.       Physical Exam  Chest:              Last Recorded Vitals  Vitals:    09/09/24 0910   BP: 141/79   Pulse: 73       Relevant Results   Time was spent viewing digital images of the radiology testing with the patient. I explained the results in depth, along with suggested explanation for follow up recommendations based on the testing results. BI-RADS Category 2    Imaging    Narrative & Impression   Interpreted By:  Mathew Ling,   STUDY:  BI MAMMO RIGHT DIAGNOSTIC TOMOSYNTHESIS;  9/9/2024 8:54 am      ACCESSION NUMBER(S):  [id number]      ORDERING CLINICIAN:  ZOYA CEVALLOS      INDICATION:  Annual exam. History of left mastectomy with chemo and radiation and  benign right core and excisional biopsies. Patient has a family  history of breast cancer.      ,Z85.3 Personal history of malignant neoplasm of breast      COMPARISON:  08/10/2023, 08/04/2022, 07/29/2021      FINDINGS:  2D and tomosynthesis images were reviewed at 1 mm slice thickness.      Density:  There are areas of scattered fibroglandular tissue.      Biopsy marker in the lateral central right breast at posterior depth.  No suspicious masses or calcifications are identified in the right  breast.      IMPRESSION:  No mammographic evidence of malignancy in the right breast.      BI-RADS CATEGORY:  BI-RADS Category:  2 Benign.  Recommendation:  Annual Screening.  Recommended Date:  1 Year.  Laterality:  Right.       Assessment/Plan   Normal clinical exam and imaging, history of left DCIS with recurrence, s/p left mastectomy without reconstruction, family history of breast cancer, scattered fibroglandular tissue    Plan: Return in one year for right screening mammogram and office visit. Breast prosthesis and mastectomy bra prescription provided.     Patient Discussion/Summary  Your clinical examination and imaging are normal. Please return in one year for right screening mammogram and office visit or sooner if  you have any problems or concerns.     You can see your health information, review clinical summaries from office visits & test results online when you follow your health with MY  Chart, a personal health record. To sign up go to [email or web address]. If you need assistance with signing up or trouble getting into your account call [employer] Patient Line 24/7 at [phone].    My office phone number is [phone] if you need to get in touch with me or have additional questions or concerns. Thank you for choosing [facility] and trusting me as your healthcare provider. I look forward to seeing you again at your next office visit. I am honored to be a provider on your health care team and I remain dedicated to helping you achieve your health goals.      Staci Buchanan, APRN-CNP

## 2024-09-09 ENCOUNTER — OFFICE VISIT (OUTPATIENT)
Dept: SURGICAL ONCOLOGY | Facility: CLINIC | Age: 79
End: 2024-09-09
Payer: MEDICARE

## 2024-09-09 ENCOUNTER — HOSPITAL ENCOUNTER (OUTPATIENT)
Dept: RADIOLOGY | Facility: CLINIC | Age: 79
Discharge: HOME | End: 2024-09-09
Payer: MEDICARE

## 2024-09-09 VITALS
WEIGHT: 191.36 LBS | HEART RATE: 73 BPM | SYSTOLIC BLOOD PRESSURE: 141 MMHG | BODY MASS INDEX: 31.84 KG/M2 | DIASTOLIC BLOOD PRESSURE: 79 MMHG

## 2024-09-09 VITALS — BODY MASS INDEX: 31.45 KG/M2 | WEIGHT: 189 LBS

## 2024-09-09 DIAGNOSIS — Z12.31 ENCOUNTER FOR SCREENING MAMMOGRAM FOR MALIGNANT NEOPLASM OF BREAST: ICD-10-CM

## 2024-09-09 DIAGNOSIS — Z85.3 ENCOUNTER FOR FOLLOW-UP SURVEILLANCE OF BREAST CANCER: Primary | ICD-10-CM

## 2024-09-09 DIAGNOSIS — Z85.3 PERSONAL HISTORY OF MALIGNANT NEOPLASM OF BREAST: ICD-10-CM

## 2024-09-09 DIAGNOSIS — Z08 ENCOUNTER FOR FOLLOW-UP SURVEILLANCE OF BREAST CANCER: Primary | ICD-10-CM

## 2024-09-09 PROCEDURE — 3077F SYST BP >= 140 MM HG: CPT | Performed by: NURSE PRACTITIONER

## 2024-09-09 PROCEDURE — 77061 BREAST TOMOSYNTHESIS UNI: CPT | Mod: RT

## 2024-09-09 PROCEDURE — 1126F AMNT PAIN NOTED NONE PRSNT: CPT | Performed by: NURSE PRACTITIONER

## 2024-09-09 PROCEDURE — G0279 TOMOSYNTHESIS, MAMMO: HCPCS | Mod: RIGHT SIDE | Performed by: STUDENT IN AN ORGANIZED HEALTH CARE EDUCATION/TRAINING PROGRAM

## 2024-09-09 PROCEDURE — 3078F DIAST BP <80 MM HG: CPT | Performed by: NURSE PRACTITIONER

## 2024-09-09 PROCEDURE — 1036F TOBACCO NON-USER: CPT | Performed by: NURSE PRACTITIONER

## 2024-09-09 PROCEDURE — 77066 DX MAMMO INCL CAD BI: CPT | Mod: RIGHT SIDE | Performed by: STUDENT IN AN ORGANIZED HEALTH CARE EDUCATION/TRAINING PROGRAM

## 2024-09-09 PROCEDURE — 99213 OFFICE O/P EST LOW 20 MIN: CPT | Performed by: NURSE PRACTITIONER

## 2024-09-09 PROCEDURE — RXMED WILLOW AMBULATORY MEDICATION CHARGE

## 2024-09-09 PROCEDURE — 1159F MED LIST DOCD IN RCRD: CPT | Performed by: NURSE PRACTITIONER

## 2024-09-09 ASSESSMENT — PAIN SCALES - GENERAL: PAINLEVEL: 0-NO PAIN

## 2024-09-09 NOTE — PATIENT INSTRUCTIONS
Your clinical examination and imaging are normal. Please return in one year for right screening mammogram and office visit or sooner if you have any problems or concerns.     You can see your health information, review clinical summaries from office visits & test results online when you follow your health with MY  Chart, a personal health record. To sign up go to [email or web address]. If you need assistance with signing up or trouble getting into your account call [employer] Patient Line 24/7 at [phone].    My office phone number is [phone] if you need to get in touch with me or have additional questions or concerns. Thank you for choosing [facility] and trusting me as your healthcare provider. I look forward to seeing you again at your next office visit. I am honored to be a provider on your health care team and I remain dedicated to helping you achieve your health goals.

## 2024-09-10 ENCOUNTER — PATIENT OUTREACH (OUTPATIENT)
Dept: PRIMARY CARE | Facility: CLINIC | Age: 79
End: 2024-09-10
Payer: MEDICARE

## 2024-09-10 DIAGNOSIS — I10 ESSENTIAL HYPERTENSION: ICD-10-CM

## 2024-09-10 DIAGNOSIS — J44.9 CHRONIC OBSTRUCTIVE PULMONARY DISEASE, UNSPECIFIED COPD TYPE (MULTI): ICD-10-CM

## 2024-09-10 DIAGNOSIS — I48.91 ATRIAL FIBRILLATION, UNSPECIFIED TYPE (MULTI): ICD-10-CM

## 2024-09-10 NOTE — PROGRESS NOTES
RN TONJA spoke with Jazzy, identified by name, . She reports increase in joint stiffness and soreness. She takes Tylenol and Bio-Freeze that helps. Referral was placed on  for physical therapy. Order is still pending. She states that her insurance may not cover, as she's has therapy in recent months. She plans to call insurance company today regarding this. Medications, appointments, reviewed. Jazzy was encouraged to monitor blood pressure at home and to keep a log for PCP and Cardiology to review at appointments. She verbalized understanding.     Treatment goals include:  Continue taking medications as prescribed. Maintain a diet rich in fruits and vegetables, lean meats and plant-based sources of protein, avoid added sugars, salt, and processed foods.  Regular exercise: if tolerable, 3-4 days a week for at least 30 minutes or more  BP of 120/80, no higher than 140/85 on consistent basis.   LDL cholesterol less than 100, and HDL cholesterol above 40.  Smoking cessation, if you are a smoker. Stay up to date with immunizations against influenza and pneumonia. Pulmonary rehabilitation as needed. These goals have been shown to improve function and reduce risk of subsequent COPD exacerbations.     Jazzy was encouraged to call with questions/concerns

## 2024-09-13 ENCOUNTER — PHARMACY VISIT (OUTPATIENT)
Dept: PHARMACY | Facility: CLINIC | Age: 79
End: 2024-09-13
Payer: MEDICARE

## 2024-09-16 ENCOUNTER — OFFICE VISIT (OUTPATIENT)
Dept: CARDIOLOGY | Facility: CLINIC | Age: 79
End: 2024-09-16
Payer: MEDICARE

## 2024-09-16 VITALS
WEIGHT: 189 LBS | SYSTOLIC BLOOD PRESSURE: 135 MMHG | BODY MASS INDEX: 31.49 KG/M2 | DIASTOLIC BLOOD PRESSURE: 80 MMHG | HEIGHT: 65 IN | HEART RATE: 61 BPM | OXYGEN SATURATION: 99 %

## 2024-09-16 DIAGNOSIS — I48.0 PAROXYSMAL ATRIAL FIBRILLATION (MULTI): Primary | ICD-10-CM

## 2024-09-16 LAB
ATRIAL RATE: 61 BPM
P AXIS: 45 DEGREES
P OFFSET: 210 MS
P ONSET: 146 MS
PR INTERVAL: 148 MS
Q ONSET: 220 MS
QRS COUNT: 10 BEATS
QRS DURATION: 84 MS
QT INTERVAL: 438 MS
QTC CALCULATION(BAZETT): 440 MS
QTC FREDERICIA: 440 MS
R AXIS: 6 DEGREES
T AXIS: 52 DEGREES
T OFFSET: 439 MS
VENTRICULAR RATE: 61 BPM

## 2024-09-16 PROCEDURE — 99214 OFFICE O/P EST MOD 30 MIN: CPT | Mod: 25 | Performed by: INTERNAL MEDICINE

## 2024-09-16 PROCEDURE — 3079F DIAST BP 80-89 MM HG: CPT | Performed by: INTERNAL MEDICINE

## 2024-09-16 PROCEDURE — 99214 OFFICE O/P EST MOD 30 MIN: CPT | Performed by: INTERNAL MEDICINE

## 2024-09-16 PROCEDURE — 93010 ELECTROCARDIOGRAM REPORT: CPT | Performed by: INTERNAL MEDICINE

## 2024-09-16 PROCEDURE — 3074F SYST BP LT 130 MM HG: CPT | Performed by: INTERNAL MEDICINE

## 2024-09-16 PROCEDURE — 1159F MED LIST DOCD IN RCRD: CPT | Performed by: INTERNAL MEDICINE

## 2024-09-16 PROCEDURE — 1036F TOBACCO NON-USER: CPT | Performed by: INTERNAL MEDICINE

## 2024-09-16 PROCEDURE — 1160F RVW MEDS BY RX/DR IN RCRD: CPT | Performed by: INTERNAL MEDICINE

## 2024-09-16 PROCEDURE — 93005 ELECTROCARDIOGRAM TRACING: CPT | Performed by: INTERNAL MEDICINE

## 2024-09-16 ASSESSMENT — ENCOUNTER SYMPTOMS
OCCASIONAL FEELINGS OF UNSTEADINESS: 1
LOSS OF SENSATION IN FEET: 1

## 2024-09-16 NOTE — PROGRESS NOTES
Subjective:  Patient returns for a routine 3-month follow-up.  She is a 79-year-old female with hypertension, hyperlipidemia, and remotely documented moderate LAD coronary disease.  She more recently was struggling with atrial fibrillation with significant functional deterioration.  She was started on dofetilide after seeing EP.  She fortunately is back in sinus rhythm and is generally feeling significantly better with this.  She denies any clear anginal type symptoms.  She is not overly active, however.  She notices some occasional short-lived palpitations but no sustained palpitations.  She has an episodic presyncopal type spell but has had no syncope or falls.    She has not had any hospitalizations and denies any other new health concerns.  She is taking all of her medications compliantly and is tolerating them well.  She denies any bleeding problems despite good compliance with her Eliquis and aspirin.    Objective:  General: Alert, usual pleasant self.  HEENT: Unchanged.  Lungs: Mildly diminished air exchange with extra prolongation but no crackles or wheezing.  Cardiac: Distant heart tones with no murmur rub or S3.  Abdomen: Nontender.  Extremities: No edema.  Skin: No acute rash.  Neuro: Grossly intact.    EKG: Normal sinus rhythm.  Within normal limits.    Lipid panel: Cholesterol-128, HDL-54, LDL-59, TG-77.    Impression/plan:  Jazzy is generally clinically stable at this time.  She is not having any concerning anginal type symptoms, so I elected not to embark on a repeat ischemic workup at this time.    I was pleased to see she generally appears to be maintaining sinus rhythm most of the time the best we can tell.  We will continue her dofetilide unchanged.  She knows to alert me if she feels her A-fib spells are potentially getting worse and we will consider a monitor at that time.  We will continue her Eliquis unchanged.    Her lipids generally look quite good on low-dose rosuvastatin which I believe  is a maximally tolerated dose.  We will thus continue this unchanged.    We will see her back for routine follow-up in 4 months to be sure that she maintains normal sinus rhythm.  She did not need any prescriptions renewed.  She knows to call for any intercurrent concerns.    Patient instructions:    Continue current medications unchanged.    Return to clinic in 4 months.    Call for any intercurrent problems.

## 2024-09-25 ENCOUNTER — HOSPITAL ENCOUNTER (OUTPATIENT)
Dept: RADIOLOGY | Facility: HOSPITAL | Age: 79
Discharge: HOME | End: 2024-09-25
Payer: MEDICARE

## 2024-09-25 ENCOUNTER — TELEPHONE (OUTPATIENT)
Dept: PRIMARY CARE | Facility: CLINIC | Age: 79
End: 2024-09-25
Payer: MEDICARE

## 2024-09-25 DIAGNOSIS — Z78.0 ASYMPTOMATIC MENOPAUSAL STATE: ICD-10-CM

## 2024-09-25 PROCEDURE — 77080 DXA BONE DENSITY AXIAL: CPT | Performed by: RADIOLOGY

## 2024-09-25 PROCEDURE — 77080 DXA BONE DENSITY AXIAL: CPT

## 2024-09-25 NOTE — TELEPHONE ENCOUNTER
Result Communication    Resulted Orders   XR DEXA bone density    Narrative    Interpreted By:  Ronnell Lala,   STUDY:  DEXA BONE DENSITY9/25/2024 9:45 am      INDICATION:  Signs/Symptoms:Screen. The patient is a 80 y/o  year old  postmenopausal F. Menopause at age 30.      COMPARISON:  None.      ACCESSION NUMBER(S):  [id number]      ORDERING CLINICIAN:  CHRISTOPHER D'AMICO      TECHNIQUE:  DEXA BONE DENSITY      FINDINGS:  SPINE L1-L4  Bone Mineral Density: 1.409 g/cm2  T-Score 2.4  Z-Score 5.1  Bone Mineral Density change vs baseline:  Not reported  Bone Mineral Density change vs previous: Not reported      LEFT FEMUR -TOTAL  Bone Mineral Density: 1.029 g/cm2  T-Score 0.0   Z-Score  1.4  Bone Mineral Density change vs baseline: Not reported  Bone Mineral Density change vs previous: Not reported      LEFT FEMUR -NECK  Bone Mineral Density: 0.858 g/cm2  T-Score -0.7  Z-Score 1          World Health Organization (WHO) criteria for post-menopausal,   Women:  Normal:         T-score at or above -1 SD  Osteopenia:   T-score between -1 and -2.5 SD  Osteoporosis: T-score at or below -2.5 SD          10-year Fracture Risk:  Major Osteoporotic Fracture  7.8 %  Hip Fracture                        0.8 %      Note:  If no FRAX score is reported, it is because:  Some T-score for Spine Total or Hip Total or Femoral Neck at or below  -2.5        Impression    DEXA:  According to World Health Organization criteria,  classification is normal.      Followup recommended in 2 years or sooner as clinically warranted.      All images and detailed analysis are available on the  Radiology  PACS.      MACRO:  None          Signed by: Ronnell Lala 9/25/2024 10:29 AM  Dictation workstation:   [id number]       4:15 PM      Results were not successfully communicated with the patient and they did not acknowledge their understanding.

## 2024-09-25 NOTE — TELEPHONE ENCOUNTER
----- Message from Christopher D'Amico sent at 9/25/2024  1:55 PM EDT -----  Bone density normal, continue 2-year screenings.

## 2024-09-27 PROCEDURE — RXMED WILLOW AMBULATORY MEDICATION CHARGE

## 2024-10-01 ENCOUNTER — PHARMACY VISIT (OUTPATIENT)
Dept: PHARMACY | Facility: CLINIC | Age: 79
End: 2024-10-01
Payer: MEDICARE

## 2024-10-08 ENCOUNTER — PATIENT OUTREACH (OUTPATIENT)
Dept: PRIMARY CARE | Facility: CLINIC | Age: 79
End: 2024-10-08
Payer: MEDICARE

## 2024-10-08 DIAGNOSIS — J44.9 CHRONIC OBSTRUCTIVE PULMONARY DISEASE, UNSPECIFIED COPD TYPE (MULTI): ICD-10-CM

## 2024-10-08 DIAGNOSIS — I10 ESSENTIAL HYPERTENSION: ICD-10-CM

## 2024-10-08 NOTE — PROGRESS NOTES
"MICHELLE EVANGELISTA spoke with Jazzy, identified by name, shannan. Medications, appointments, treatment goals reviewed with Jazzy.     She is recovering from \"a chest cold\" with cough. She reports that Flonase help. She is concerned with contraindications of cold medicines with Dofetilide medication that she is taking. She was encouraged to avoid over the counter medications with \"pseudoephedrine\", \"DM\", \"PM\", or \"- D\" (zyrtec-D)\" in the name. She wrote this information down and verbalized understanding. Jazzy has a history of COPD and was encouraged to seek medical attention if symptoms persists, or worsens, difficulty breathing/shortness of breath, chest pain, etc.     Treatment goals include: Take medications as prescribed. Smoking cessation, if you are a smoker. Stay up to date with immunizations against influenza and pneumonia. Pulmonary rehabilitation if needed. These goals have been shown to improve function and reduce risk of subsequent COPD exacerbations.     Jazzy was encouraged to call with questions/concerns  "

## 2024-10-18 PROCEDURE — RXMED WILLOW AMBULATORY MEDICATION CHARGE

## 2024-10-19 ENCOUNTER — PHARMACY VISIT (OUTPATIENT)
Dept: PHARMACY | Facility: CLINIC | Age: 79
End: 2024-10-19
Payer: MEDICARE

## 2024-10-24 PROCEDURE — RXMED WILLOW AMBULATORY MEDICATION CHARGE

## 2024-10-26 ENCOUNTER — PHARMACY VISIT (OUTPATIENT)
Dept: PHARMACY | Facility: CLINIC | Age: 79
End: 2024-10-26
Payer: MEDICARE

## 2024-11-01 ENCOUNTER — PATIENT OUTREACH (OUTPATIENT)
Dept: PRIMARY CARE | Facility: CLINIC | Age: 79
End: 2024-11-01
Payer: MEDICARE

## 2024-11-01 DIAGNOSIS — I48.91 ATRIAL FIBRILLATION, UNSPECIFIED TYPE (MULTI): ICD-10-CM

## 2024-11-01 DIAGNOSIS — I10 ESSENTIAL HYPERTENSION: ICD-10-CM

## 2024-11-04 DIAGNOSIS — J32.9 SINUSITIS, UNSPECIFIED CHRONICITY, UNSPECIFIED LOCATION: ICD-10-CM

## 2024-11-04 RX ORDER — FLUTICASONE PROPIONATE 50 MCG
1 SPRAY, SUSPENSION (ML) NASAL DAILY PRN
Qty: 16 G | Refills: 3 | Status: SHIPPED | OUTPATIENT
Start: 2024-11-04

## 2024-11-23 PROCEDURE — RXMED WILLOW AMBULATORY MEDICATION CHARGE

## 2024-11-25 ENCOUNTER — PHARMACY VISIT (OUTPATIENT)
Dept: PHARMACY | Facility: CLINIC | Age: 79
End: 2024-11-25
Payer: MEDICARE

## 2024-12-02 ENCOUNTER — PATIENT OUTREACH (OUTPATIENT)
Dept: PRIMARY CARE | Facility: CLINIC | Age: 79
End: 2024-12-02
Payer: MEDICARE

## 2024-12-02 DIAGNOSIS — E78.2 MIXED HYPERLIPIDEMIA: ICD-10-CM

## 2024-12-02 DIAGNOSIS — I10 ESSENTIAL HYPERTENSION: ICD-10-CM

## 2024-12-02 DIAGNOSIS — I48.91 ATRIAL FIBRILLATION, UNSPECIFIED TYPE (MULTI): ICD-10-CM

## 2024-12-02 NOTE — PROGRESS NOTES
RN TONJA spoke with Jazzy, identified by name, shannan. Medications, appointments, treatment goals reviewed. Questions answered. She has no needs, as her PCP appt is tomorrow. She has scheduled a ride to the appointment.     Treatment goals include:  Maintain a diet rich in fruits and vegetables, lean meats and plant-based sources of protein, avoid added sugars, salt, and processed foods. Avoid inflammatory cooking oils such as corn and canola.   BP of 120/80, no higher than 140/85 on consistent basis.   Regular exercise: if tolerable, 3-4 days a week for at least 30 minutes or more  Work toward a goal BMI of less than 25, although in the shorter term, losing even 10 pounds will help  LDL cholesterol less than 100, and HDL cholesterol above 40.    Jazzy was encouraged to call with questions/concerns

## 2024-12-03 ENCOUNTER — APPOINTMENT (OUTPATIENT)
Dept: PRIMARY CARE | Facility: CLINIC | Age: 79
End: 2024-12-03
Payer: MEDICARE

## 2024-12-20 PROCEDURE — RXMED WILLOW AMBULATORY MEDICATION CHARGE

## 2024-12-21 ENCOUNTER — PHARMACY VISIT (OUTPATIENT)
Dept: PHARMACY | Facility: CLINIC | Age: 79
End: 2024-12-21
Payer: MEDICARE

## 2024-12-24 PROCEDURE — RXMED WILLOW AMBULATORY MEDICATION CHARGE

## 2024-12-30 ENCOUNTER — PHARMACY VISIT (OUTPATIENT)
Dept: PHARMACY | Facility: CLINIC | Age: 79
End: 2024-12-30
Payer: MEDICARE

## 2025-01-09 ENCOUNTER — PATIENT OUTREACH (OUTPATIENT)
Dept: PRIMARY CARE | Facility: CLINIC | Age: 80
End: 2025-01-09
Payer: MEDICARE

## 2025-01-09 DIAGNOSIS — E78.2 MIXED HYPERLIPIDEMIA: ICD-10-CM

## 2025-01-09 DIAGNOSIS — I10 ESSENTIAL HYPERTENSION: ICD-10-CM

## 2025-01-09 NOTE — PROGRESS NOTES
MICHELLE EVANGELISTA spoke with Jazzy, identified by name, shannan. Medications, appointments, treatment goals reviewed. Questions answered. She reports that her furnace is blowing cool air. She has a family member who works in the [employer] industry who will visit her to fix the problem. She has a space heater to keep warm and friends and family to stay with, if she need to. She reports no other concerns. No needs from RN CM nor PCP at this time.     Treatment goals include:  Maintaining a diet rich in fruits and vegetables, lean meats and plant-based sources of protein, avoid added sugars, salt, and processed foods.  Regular exercise: if tolerable, 3-4 days a week for at least 30 minutes or more  BP of 120/80, no higher than 140/85 on consistent basis. Avoid inflammatory cooking oils such as corn and canola.  LDL cholesterol less than 100, and HDL cholesterol above 40.  ,  Jazzy was encouraged to call with questions/concerns

## 2025-01-12 PROCEDURE — RXMED WILLOW AMBULATORY MEDICATION CHARGE

## 2025-01-13 ENCOUNTER — OFFICE VISIT (OUTPATIENT)
Dept: CARDIOLOGY | Facility: CLINIC | Age: 80
End: 2025-01-13
Payer: MEDICARE

## 2025-01-13 VITALS
DIASTOLIC BLOOD PRESSURE: 75 MMHG | BODY MASS INDEX: 32.92 KG/M2 | HEART RATE: 58 BPM | OXYGEN SATURATION: 98 % | WEIGHT: 197.8 LBS | SYSTOLIC BLOOD PRESSURE: 145 MMHG

## 2025-01-13 DIAGNOSIS — M79.605 LEG PAIN, BILATERAL: ICD-10-CM

## 2025-01-13 DIAGNOSIS — I48.91 ATRIAL FIBRILLATION, UNSPECIFIED TYPE (MULTI): Primary | ICD-10-CM

## 2025-01-13 DIAGNOSIS — I73.9 CLAUDICATION (CMS-HCC): ICD-10-CM

## 2025-01-13 DIAGNOSIS — M79.604 LEG PAIN, BILATERAL: ICD-10-CM

## 2025-01-13 PROCEDURE — 1160F RVW MEDS BY RX/DR IN RCRD: CPT | Performed by: INTERNAL MEDICINE

## 2025-01-13 PROCEDURE — 1036F TOBACCO NON-USER: CPT | Performed by: INTERNAL MEDICINE

## 2025-01-13 PROCEDURE — 1159F MED LIST DOCD IN RCRD: CPT | Performed by: INTERNAL MEDICINE

## 2025-01-13 PROCEDURE — 1126F AMNT PAIN NOTED NONE PRSNT: CPT | Performed by: INTERNAL MEDICINE

## 2025-01-13 PROCEDURE — 99214 OFFICE O/P EST MOD 30 MIN: CPT | Performed by: INTERNAL MEDICINE

## 2025-01-13 PROCEDURE — 3077F SYST BP >= 140 MM HG: CPT | Performed by: INTERNAL MEDICINE

## 2025-01-13 PROCEDURE — 3078F DIAST BP <80 MM HG: CPT | Performed by: INTERNAL MEDICINE

## 2025-01-13 ASSESSMENT — PAIN SCALES - GENERAL: PAINLEVEL_OUTOF10: 0-NO PAIN

## 2025-01-13 NOTE — PROGRESS NOTES
Subjective:  Patient returns for a routine 4 follow-up.  She generally has been doing reasonably well since her last visit.  She is not having any problematic palpitations although she does have some short-lived breakthrough spells of palpitations.  She denies any stroke or TIA symptomatology.    She does get some occasional atypical chest discomfort spells.  She has tried some nitroglycerin, but it is not clear if this helps much.    She has not had any hospitalizations.  She is having some atypical left leg pain but denies any other new health concerns.    She is taking all her medications compliantly but unfortunately has not been able to afford the Eliquis.    Objective:  General: Alert pleasant self.  HEENT: Unchanged.  Lungs: Clear without crackles.  Cardiac: Without change.  Abdomen: Nontender.  Extremities: No edema.  Skin: No acute rash.  Neuro grossly intact.    EKG: Sinus bradycardia.  No acute changes.    Impression/plan:  Jazzy is generally doing reasonably well at this time.  I am not convinced that her chest pain spells represent angina, but we will need to monitor these closely to be sure that they do not worsen.  She does have a moderate LAD lesion documented at remote cath.    Her A-fib burden appears to be relatively limited by symptomatology.  I will continue her dofetilide for now.  I will plan on getting her into the specialty pharmacy to see if they can get her her Eliquis at a more affordable price.    Her lipid panels have historically been in reasonably good range so we will continue her current regimen unchanged.    Her blood pressure remains in good range so we will continue her current regimen.    Given her atypical leg pain, I will plan on checking ABIs just to make sure we are not missing any concerning PAD.    Patient instructions:    Continue current medications unchanged.    Obtain your ABIs and report for your specialty pharmacy visit when scheduled.    Return to clinic in 1  month.

## 2025-01-14 ENCOUNTER — LAB (OUTPATIENT)
Dept: LAB | Facility: LAB | Age: 80
End: 2025-01-14
Payer: MEDICARE

## 2025-01-14 ENCOUNTER — APPOINTMENT (OUTPATIENT)
Dept: PRIMARY CARE | Facility: CLINIC | Age: 80
End: 2025-01-14
Payer: MEDICARE

## 2025-01-14 VITALS
WEIGHT: 193 LBS | BODY MASS INDEX: 32.15 KG/M2 | HEIGHT: 65 IN | HEART RATE: 76 BPM | SYSTOLIC BLOOD PRESSURE: 159 MMHG | DIASTOLIC BLOOD PRESSURE: 63 MMHG | OXYGEN SATURATION: 96 %

## 2025-01-14 DIAGNOSIS — M25.552 LEFT HIP PAIN: ICD-10-CM

## 2025-01-14 DIAGNOSIS — I10 ESSENTIAL HYPERTENSION: ICD-10-CM

## 2025-01-14 DIAGNOSIS — E78.2 MIXED HYPERLIPIDEMIA: ICD-10-CM

## 2025-01-14 DIAGNOSIS — M54.2 NECK PAIN: ICD-10-CM

## 2025-01-14 DIAGNOSIS — C67.9 UROTHELIAL CARCINOMA OF BLADDER (MULTI): ICD-10-CM

## 2025-01-14 DIAGNOSIS — I48.92 ATRIAL FLUTTER, UNSPECIFIED TYPE (MULTI): ICD-10-CM

## 2025-01-14 DIAGNOSIS — M54.50 LOW BACK PAIN, UNSPECIFIED BACK PAIN LATERALITY, UNSPECIFIED CHRONICITY, UNSPECIFIED WHETHER SCIATICA PRESENT: ICD-10-CM

## 2025-01-14 DIAGNOSIS — I48.91 ATRIAL FIBRILLATION, UNSPECIFIED TYPE (MULTI): ICD-10-CM

## 2025-01-14 DIAGNOSIS — G95.9 CERVICAL MYELOPATHY: ICD-10-CM

## 2025-01-14 DIAGNOSIS — I10 ESSENTIAL HYPERTENSION: Primary | ICD-10-CM

## 2025-01-14 DIAGNOSIS — E78.5 HYPERLIPIDEMIA: ICD-10-CM

## 2025-01-14 PROBLEM — J42 CHRONIC BRONCHITIS (MULTI): Status: RESOLVED | Noted: 2023-06-21 | Resolved: 2025-01-14

## 2025-01-14 PROBLEM — I50.9 HEART FAILURE: Status: RESOLVED | Noted: 2024-03-20 | Resolved: 2025-01-14

## 2025-01-14 PROBLEM — J44.9 COPD (CHRONIC OBSTRUCTIVE PULMONARY DISEASE) (MULTI): Status: RESOLVED | Noted: 2023-06-21 | Resolved: 2025-01-14

## 2025-01-14 PROBLEM — I50.9 ACUTE ON CHRONIC CONGESTIVE HEART FAILURE: Status: RESOLVED | Noted: 2023-11-12 | Resolved: 2025-01-14

## 2025-01-14 LAB
ERYTHROCYTE [DISTWIDTH] IN BLOOD BY AUTOMATED COUNT: 14.4 % (ref 11.5–14.5)
HCT VFR BLD AUTO: 43 % (ref 36–46)
HGB BLD-MCNC: 13.4 G/DL (ref 12–16)
MCH RBC QN AUTO: 25.8 PG (ref 26–34)
MCHC RBC AUTO-ENTMCNC: 31.2 G/DL (ref 32–36)
MCV RBC AUTO: 83 FL (ref 80–100)
NRBC BLD-RTO: 0 /100 WBCS (ref 0–0)
PLATELET # BLD AUTO: 280 X10*3/UL (ref 150–450)
RBC # BLD AUTO: 5.19 X10*6/UL (ref 4–5.2)
WBC # BLD AUTO: 5.5 X10*3/UL (ref 4.4–11.3)

## 2025-01-14 PROCEDURE — 1124F ACP DISCUSS-NO DSCNMKR DOCD: CPT | Performed by: STUDENT IN AN ORGANIZED HEALTH CARE EDUCATION/TRAINING PROGRAM

## 2025-01-14 PROCEDURE — 85027 COMPLETE CBC AUTOMATED: CPT

## 2025-01-14 PROCEDURE — 90662 IIV NO PRSV INCREASED AG IM: CPT | Performed by: STUDENT IN AN ORGANIZED HEALTH CARE EDUCATION/TRAINING PROGRAM

## 2025-01-14 PROCEDURE — 1159F MED LIST DOCD IN RCRD: CPT | Performed by: STUDENT IN AN ORGANIZED HEALTH CARE EDUCATION/TRAINING PROGRAM

## 2025-01-14 PROCEDURE — G0008 ADMIN INFLUENZA VIRUS VAC: HCPCS | Performed by: STUDENT IN AN ORGANIZED HEALTH CARE EDUCATION/TRAINING PROGRAM

## 2025-01-14 PROCEDURE — 99214 OFFICE O/P EST MOD 30 MIN: CPT | Performed by: STUDENT IN AN ORGANIZED HEALTH CARE EDUCATION/TRAINING PROGRAM

## 2025-01-14 PROCEDURE — 80053 COMPREHEN METABOLIC PANEL: CPT

## 2025-01-14 PROCEDURE — 3078F DIAST BP <80 MM HG: CPT | Performed by: STUDENT IN AN ORGANIZED HEALTH CARE EDUCATION/TRAINING PROGRAM

## 2025-01-14 PROCEDURE — 80061 LIPID PANEL: CPT

## 2025-01-14 PROCEDURE — 1036F TOBACCO NON-USER: CPT | Performed by: STUDENT IN AN ORGANIZED HEALTH CARE EDUCATION/TRAINING PROGRAM

## 2025-01-14 PROCEDURE — 3077F SYST BP >= 140 MM HG: CPT | Performed by: STUDENT IN AN ORGANIZED HEALTH CARE EDUCATION/TRAINING PROGRAM

## 2025-01-14 PROCEDURE — 1160F RVW MEDS BY RX/DR IN RCRD: CPT | Performed by: STUDENT IN AN ORGANIZED HEALTH CARE EDUCATION/TRAINING PROGRAM

## 2025-01-14 PROCEDURE — G2211 COMPLEX E/M VISIT ADD ON: HCPCS | Performed by: STUDENT IN AN ORGANIZED HEALTH CARE EDUCATION/TRAINING PROGRAM

## 2025-01-14 RX ORDER — AMLODIPINE BESYLATE 5 MG/1
5 TABLET ORAL DAILY
Qty: 90 TABLET | Refills: 1 | Status: SHIPPED | OUTPATIENT
Start: 2025-01-14 | End: 2025-07-13

## 2025-01-14 RX ORDER — ROSUVASTATIN CALCIUM 5 MG/1
5 TABLET, COATED ORAL NIGHTLY
Qty: 90 TABLET | Refills: 1 | Status: SHIPPED | OUTPATIENT
Start: 2025-01-14 | End: 2025-07-13

## 2025-01-14 ASSESSMENT — PATIENT HEALTH QUESTIONNAIRE - PHQ9
SUM OF ALL RESPONSES TO PHQ9 QUESTIONS 1 AND 2: 0
2. FEELING DOWN, DEPRESSED OR HOPELESS: NOT AT ALL
1. LITTLE INTEREST OR PLEASURE IN DOING THINGS: NOT AT ALL

## 2025-01-14 ASSESSMENT — ENCOUNTER SYMPTOMS
OCCASIONAL FEELINGS OF UNSTEADINESS: 0
LOSS OF SENSATION IN FEET: 0
DEPRESSION: 0

## 2025-01-14 NOTE — PROGRESS NOTES
79-year-old female presenting for follow-up on multiple concerns.  Doing relatively well without any significant new concerns or interval changes.    A-fib a flutter, HTN  Stable, tolerates current regimen well.  Following with general cardiology and EP.    Low back pain with sciatica, hip pain, neck pain  X-rays show arthritis throughout the hip and back, neck x-ray still pending.  Pain persistent.  Did not do physical therapy in the interval.    COPD  Was diagnosed, on no treatments, became asymptomatic after quitting smoking.    History of urothelial carcinoma of bladder  Reports that she is in remission    SocHx:   - Smoking: About 55 pack years, quit a year and a half ago    Denies HA, changes in vision, chest pain, SOB/MARION, palpitations, N/V/D/C, dysuria/hematuria, hematochezia/melena, paresthesias, LE edema.    12 point ROS reviewed and negative other than as stated in HPI      General: Alert, oriented, pleasant, in no acute distress  HEENT:      Head: normocephalic, atraumatic;      eyes: EOMI, no scleral icterus;   Neck: soft, supple, non-tender, no masses appreciated  CV: Heart with regular rate and rhythm, normal S1/S2, no murmurs  Lungs: CTAB without wheezing, rhonchi or rales; good respiratory effort, no increased work of breathing  Abdomen: soft, non-tender, non-distended, no masses appreciated  Extremities: no edema, no cyanosis  Neuro: Cranial nerves grossly intact; alert and oriented  Psych: Appropriate mood and affect    #A-fib #atrial flutter #HTN  -Blood pressure higher than goal today, has not taken her meds today  - Sinus rhythm on auscultation  - Continue current regimen through EP  - Continue amlodipine 5 mg daily, losartan 25 mg daily, metoprolol succinate 25 mg daily  -Continue to follow cardiology    #HLD  - Continue rosuvastatin 5 mg daily  - Repeat lipid panel    #L hip pain #low back pain #neck pain, cervical myelopathy  -Hip x-ray and lumbar shows degenerative changes  -X-ray cervical  spine still pending  - Physical therapy referral reordered    #COPD  -Was diagnosed, on no treatments, became asymptomatic after quitting smoking    #Urothelial carcinoma of bladder  -Status post TURBT and chemotherapy  -Was following with urology, Dr. Bella    F/U 4-6 months, sooner if indicated, Medicare ~August    Chris D'Amico, DO

## 2025-01-15 ENCOUNTER — PHARMACY VISIT (OUTPATIENT)
Dept: PHARMACY | Facility: CLINIC | Age: 80
End: 2025-01-15
Payer: MEDICARE

## 2025-01-15 LAB
ALBUMIN SERPL BCP-MCNC: 4.2 G/DL (ref 3.4–5)
ALP SERPL-CCNC: 73 U/L (ref 33–136)
ALT SERPL W P-5'-P-CCNC: 10 U/L (ref 7–45)
ANION GAP SERPL CALC-SCNC: 13 MMOL/L (ref 10–20)
AST SERPL W P-5'-P-CCNC: 14 U/L (ref 9–39)
BILIRUB SERPL-MCNC: 0.4 MG/DL (ref 0–1.2)
BUN SERPL-MCNC: 19 MG/DL (ref 6–23)
CALCIUM SERPL-MCNC: 9.8 MG/DL (ref 8.6–10.6)
CHLORIDE SERPL-SCNC: 105 MMOL/L (ref 98–107)
CHOLEST SERPL-MCNC: 141 MG/DL (ref 0–199)
CHOLESTEROL/HDL RATIO: 2.4
CO2 SERPL-SCNC: 28 MMOL/L (ref 21–32)
CREAT SERPL-MCNC: 0.74 MG/DL (ref 0.5–1.05)
EGFRCR SERPLBLD CKD-EPI 2021: 83 ML/MIN/1.73M*2
GLUCOSE SERPL-MCNC: 95 MG/DL (ref 74–99)
HDLC SERPL-MCNC: 58.9 MG/DL
LDLC SERPL CALC-MCNC: 68 MG/DL
NON HDL CHOLESTEROL: 82 MG/DL (ref 0–149)
POTASSIUM SERPL-SCNC: 4.4 MMOL/L (ref 3.5–5.3)
PROT SERPL-MCNC: 7.3 G/DL (ref 6.4–8.2)
SODIUM SERPL-SCNC: 142 MMOL/L (ref 136–145)
TRIGL SERPL-MCNC: 70 MG/DL (ref 0–149)
VLDL: 14 MG/DL (ref 0–40)

## 2025-01-16 ENCOUNTER — TELEPHONE (OUTPATIENT)
Dept: PRIMARY CARE | Facility: CLINIC | Age: 80
End: 2025-01-16
Payer: MEDICARE

## 2025-01-16 NOTE — TELEPHONE ENCOUNTER
Result Communication    Resulted Orders   CBC   Result Value Ref Range    WBC 5.5 4.4 - 11.3 x10*3/uL    nRBC 0.0 0.0 - 0.0 /100 WBCs    RBC 5.19 4.00 - 5.20 x10*6/uL    Hemoglobin 13.4 12.0 - 16.0 g/dL    Hematocrit 43.0 36.0 - 46.0 %    MCV 83 80 - 100 fL    MCH 25.8 (L) 26.0 - 34.0 pg    MCHC 31.2 (L) 32.0 - 36.0 g/dL    RDW 14.4 11.5 - 14.5 %    Platelets 280 150 - 450 x10*3/uL   Comprehensive Metabolic Panel   Result Value Ref Range    Glucose 95 74 - 99 mg/dL    Sodium 142 136 - 145 mmol/L    Potassium 4.4 3.5 - 5.3 mmol/L    Chloride 105 98 - 107 mmol/L    Bicarbonate 28 21 - 32 mmol/L    Anion Gap 13 10 - 20 mmol/L    Urea Nitrogen 19 6 - 23 mg/dL    Creatinine 0.74 0.50 - 1.05 mg/dL    eGFR 83 >60 mL/min/1.73m*2      Comment:      Calculations of estimated GFR are performed using the 2021 CKD-EPI Study Refit equation without the race variable for the IDMS-Traceable creatinine methods.  https://jasn.asnjournals.org/content/early/2021/09/22/ASN.3664997408    Calcium 9.8 8.6 - 10.6 mg/dL    Albumin 4.2 3.4 - 5.0 g/dL    Alkaline Phosphatase 73 33 - 136 U/L    Total Protein 7.3 6.4 - 8.2 g/dL    AST 14 9 - 39 U/L    Bilirubin, Total 0.4 0.0 - 1.2 mg/dL    ALT 10 7 - 45 U/L      Comment:      Patients treated with Sulfasalazine may generate falsely decreased results for ALT.   Lipid Panel   Result Value Ref Range    Cholesterol 141 0 - 199 mg/dL      Comment:            Age      Desirable   Borderline High   High     0-19 Y     0 - 169       170 - 199     >/= 200    20-24 Y     0 - 189       190 - 224     >/= 225         >24 Y     0 - 199       200 - 239     >/= 240   **All ranges are based on fasting samples. Specific   therapeutic targets will vary based on patient-specific   cardiac risk.    Pediatric guidelines reference:Pediatrics 2011, 128(S5).Adult guidelines reference: NCEP ATPIII Guidelines,JOÃO 2001, 258:2486-97    Venipuncture immediately after or during the administration of Metamizole may lead to  falsely low results. Testing should be performed immediately prior to Metamizole dosing.    HDL-Cholesterol 58.9 mg/dL      Comment:        Age       Very Low   Low     Normal    High    0-19 Y    < 35      < 40     40-45     ----  20-24 Y    ----     < 40      >45      ----        >24 Y      ----     < 40     40-60      >60      Cholesterol/HDL Ratio 2.4       Comment:        Ref Values  Desirable  < 3.4  High Risk  > 5.0    LDL Calculated 68 <=99 mg/dL      Comment:                                  Near   Borderline      AGE      Desirable  Optimal    High     High     Very High     0-19 Y     0 - 109     ---    110-129   >/= 130     ----    20-24 Y     0 - 119     ---    120-159   >/= 160     ----      >24 Y     0 -  99   100-129  130-159   160-189     >/=190      VLDL 14 0 - 40 mg/dL    Triglycerides 70 0 - 149 mg/dL      Comment:      Age              Desirable        Borderline         High        Very High  SEX:B           mg/dL             mg/dL               mg/dL      mg/dL  <=14D                       ----               ----        ----  15D-365D                    ----               ----        ----  1Y-9Y           0-74               75-99             >=100       ----  10Y-19Y        0-89                            >=130       ----  20Y-24Y        0-114             115-149             >=150      ----  >= 25Y         0-149             150-199             200-499    >=500      Venipuncture immediately after or during the administration of Metamizole may lead to falsely low results. Testing should be performed immediately prior to Metamizole dosing.    Non HDL Cholesterol 82 0 - 149 mg/dL      Comment:            Age       Desirable   Borderline High   High     Very High     0-19 Y     0 - 119       120 - 144     >/= 145    >/= 160    20-24 Y     0 - 149       150 - 189     >/= 190      ----         >24 Y    30 mg/dL above LDL Cholesterol goal         3:13 PM      Results were  successfully communicated with the patient and they acknowledged their understanding.

## 2025-01-16 NOTE — TELEPHONE ENCOUNTER
----- Message from Christopher D'Amico sent at 1/15/2025  1:05 PM EST -----  Labs essentially unremarkable.

## 2025-01-21 ENCOUNTER — HOSPITAL ENCOUNTER (OUTPATIENT)
Dept: VASCULAR MEDICINE | Facility: CLINIC | Age: 80
Discharge: HOME | End: 2025-01-21
Payer: MEDICARE

## 2025-01-21 DIAGNOSIS — I73.9 CLAUDICATION (CMS-HCC): ICD-10-CM

## 2025-01-21 DIAGNOSIS — M79.604 LEG PAIN, BILATERAL: ICD-10-CM

## 2025-01-21 DIAGNOSIS — M79.605 LEG PAIN, BILATERAL: ICD-10-CM

## 2025-01-21 PROCEDURE — 93922 UPR/L XTREMITY ART 2 LEVELS: CPT

## 2025-01-21 PROCEDURE — 93922 UPR/L XTREMITY ART 2 LEVELS: CPT | Performed by: SURGERY

## 2025-01-28 ENCOUNTER — APPOINTMENT (OUTPATIENT)
Dept: PHARMACY | Facility: HOSPITAL | Age: 80
End: 2025-01-28
Payer: MEDICARE

## 2025-02-04 ENCOUNTER — APPOINTMENT (OUTPATIENT)
Dept: PHYSICAL THERAPY | Facility: CLINIC | Age: 80
End: 2025-02-04
Payer: MEDICARE

## 2025-02-06 ENCOUNTER — PATIENT OUTREACH (OUTPATIENT)
Dept: PRIMARY CARE | Facility: CLINIC | Age: 80
End: 2025-02-06
Payer: MEDICARE

## 2025-02-06 DIAGNOSIS — I48.91 ATRIAL FIBRILLATION, UNSPECIFIED TYPE (MULTI): ICD-10-CM

## 2025-02-06 DIAGNOSIS — I10 ESSENTIAL HYPERTENSION: ICD-10-CM

## 2025-02-06 DIAGNOSIS — E78.2 MIXED HYPERLIPIDEMIA: ICD-10-CM

## 2025-02-06 NOTE — PROGRESS NOTES
"MICHELLE EVANGELISTA spoke with Jazzy, identified by name, shannan. Medications, appointments, treatment goals reviewed with Jazzy. Questions answered.     She reports not feeling well last week, after receiving Flu vaccination. She is feeling better now. Her appetite is \"somewhat\" ok, not feeling like eating much during the day, but would wake up at night hungry. She eats Cream of Wheat for breakfast on most days. She was encouraged to eat small frequent meals throughout the day instead of larger meals at lunch and/or dinner. She is otherwise doing well.     She continues to struggle with affording Eliquis. MICHELLE EVANGELISTA referred Jazzy to Pharmacist for assistance. She continues taking this and all other medications as prescribed. She reports no needs from PCP at this time.     Treatment goals include:  Maintaining a diet rich in fruits and vegetables, lean meats and plant-based sources of protein in small portions throughout the day. Avoid added sugars, salt, and processed foods.  Remain active.  BP of 120/80, no higher than 140/85 on consistent basis.   LDL cholesterol less than 100, and HDL cholesterol above 40.    Jazzy was encouraged to call with questions/concerns  "

## 2025-02-10 DIAGNOSIS — I10 ESSENTIAL HYPERTENSION: ICD-10-CM

## 2025-02-10 PROCEDURE — RXMED WILLOW AMBULATORY MEDICATION CHARGE

## 2025-02-10 RX ORDER — FUROSEMIDE 20 MG/1
20 TABLET ORAL DAILY
Qty: 90 TABLET | Refills: 3 | Status: SHIPPED | OUTPATIENT
Start: 2025-02-10

## 2025-02-11 ENCOUNTER — PHARMACY VISIT (OUTPATIENT)
Dept: PHARMACY | Facility: CLINIC | Age: 80
End: 2025-02-11
Payer: MEDICARE

## 2025-02-17 ENCOUNTER — APPOINTMENT (OUTPATIENT)
Dept: CARDIOLOGY | Facility: CLINIC | Age: 80
End: 2025-02-17
Payer: MEDICARE

## 2025-02-24 ENCOUNTER — OFFICE VISIT (OUTPATIENT)
Dept: CARDIOLOGY | Facility: CLINIC | Age: 80
End: 2025-02-24
Payer: MEDICARE

## 2025-02-24 VITALS
OXYGEN SATURATION: 98 % | BODY MASS INDEX: 33.05 KG/M2 | DIASTOLIC BLOOD PRESSURE: 80 MMHG | HEIGHT: 65 IN | SYSTOLIC BLOOD PRESSURE: 140 MMHG | HEART RATE: 64 BPM | WEIGHT: 198.4 LBS

## 2025-02-24 DIAGNOSIS — I48.0 PAROXYSMAL ATRIAL FIBRILLATION (MULTI): Primary | ICD-10-CM

## 2025-02-24 PROCEDURE — 99214 OFFICE O/P EST MOD 30 MIN: CPT | Performed by: INTERNAL MEDICINE

## 2025-02-24 PROCEDURE — 1159F MED LIST DOCD IN RCRD: CPT | Performed by: INTERNAL MEDICINE

## 2025-02-24 PROCEDURE — 1160F RVW MEDS BY RX/DR IN RCRD: CPT | Performed by: INTERNAL MEDICINE

## 2025-02-24 PROCEDURE — 3079F DIAST BP 80-89 MM HG: CPT | Performed by: INTERNAL MEDICINE

## 2025-02-24 PROCEDURE — 3074F SYST BP LT 130 MM HG: CPT | Performed by: INTERNAL MEDICINE

## 2025-02-24 PROCEDURE — 1036F TOBACCO NON-USER: CPT | Performed by: INTERNAL MEDICINE

## 2025-02-24 PROCEDURE — 1126F AMNT PAIN NOTED NONE PRSNT: CPT | Performed by: INTERNAL MEDICINE

## 2025-02-24 ASSESSMENT — ENCOUNTER SYMPTOMS
DEPRESSION: 0
OCCASIONAL FEELINGS OF UNSTEADINESS: 1
LOSS OF SENSATION IN FEET: 1

## 2025-02-24 ASSESSMENT — PAIN SCALES - GENERAL: PAINLEVEL_OUTOF10: 0-NO PAIN

## 2025-02-24 NOTE — PROGRESS NOTES
Subjective:  Patient returns for a 6-week follow-up.  She generally is doing reasonably well.  She continues to get some atypical left upper chest discomfort.  This is not clearly angina the best I can tell.  She does not get any exertionally mediated symptomatology.  She denies any severe or prolonged episodes of pain.    She continues to get some limited palpitation symptoms but nothing sustained.  She is currently out of Eliquis and will be working with the specialty pharmacy to see if they can get this for her at a more affordable price for her PAF.    She remains compliant with her other medications and is tolerating them well.  She did get her lipids rechecked recently.  She overall is generally happy and comfortable with how she is doing.    I was pleased to see that her ABIs were generally unremarkable as these were checked for some atypical leg symptomatology.    Objective:  General: Alert, usual pleasant self.  HEENT: Unchanged.  Lungs: Clear with good air exchange and no crackles or wheezing.  Cardiac: Normal S1 and S2 with soft systolic murmur.  Abdomen: Nontender.  Extremities: No edema.  Skin: No rash.  Neuro: Grossly intact and unchanged.    Lipid panel: Cholesterol-141, HDL-59, LDL-68, TG-70.    Impression/plan:  Jazzy is doing reasonably well at this time.  We will continue to monitor her chest discomfort conservatively.  She knows to call for any problematic escalation of her symptoms in which case we will sort out if we need to consider a repeat stress test or potentially a repeat catheterization given her remotely documented moderate LAD lesion.    Her blood pressure is in reasonable range at this time so we will continue her current medicines without change.    She still appears to be having some breakthrough atrial fibrillation, so I instructed her to continue her dofetilide and to work quickly with the specialty pharmacy to see if they can get her back on Eliquis.    Her lipid panel looks  excellent on low-dose rosuvastatin so we will continue this unchanged.    I will see her back for routine follow-up in 3 months but she knows to call for any intercurrent concerns.  She did not need any prescriptions renewed.    Patient instructions:    Continue current medications unchanged.    Update me regarding your Eliquis therapy.    Return to clinic in 3 months.

## 2025-02-25 NOTE — PROGRESS NOTES
Pharmacist Clinic: Cardiology Management    Benny Schaeffer is a 79 y.o. female was referred to Clinical Pharmacy Team for anticoag management.     Referring Provider: Randy Thomas, *    THIS IS A NEW PATIENT APPOINTMENT. PATIENT WILL BE ESTABLISHING CARE WITH CLINICAL PHARMACY.    Appointment was completed by benny who was reached at .    REVIEW OF PAST APPNT (IF APPLICABLE):   N/a    Allergies Reviewed? Yes    No Known Allergies    Past Medical History:   Diagnosis Date    Adhesive capsulitis of left shoulder 08/04/2022    Adhesive capsulitis of left shoulder    Benign neoplasm of descending colon 04/04/2023    Breast cancer (Multi)     left breast    Congenital hiatus hernia 03/20/2024    Gastroduodenitis 03/20/2024    Hx antineoplastic chemo     left breast    Numbness of face 03/20/2024    Personal history of irradiation     left breast    Personal history of other benign neoplasm     History of other benign neoplasm       Current Outpatient Medications on File Prior to Visit   Medication Sig Dispense Refill    amLODIPine (Norvasc) 5 mg tablet Take 1 tablet (5 mg) by mouth once daily. 90 tablet 1    aspirin 81 mg EC tablet Take 1 tablet (81 mg) by mouth once daily.      cholecalciferol (Vitamin D-3) 5,000 Units tablet Take 1 tablet (5,000 Units) by mouth once daily.      cyanocobalamin (Vitamin B-12) 1,000 mcg tablet Take 1 tablet (1,000 mcg) by mouth once daily.      dofetilide (Tikosyn) 250 mcg capsule Take 1 capsule (250 mcg) by mouth every 12 hours. 180 capsule 3    Eliquis 5 mg tablet Take 1 tablet (5 mg) by mouth every 12 hours. (Patient not taking: Reported on 2/24/2025) 180 tablet 3    fluticasone (Flonase) 50 mcg/actuation nasal spray Administer 1 spray into each nostril once daily as needed for rhinitis or allergies. Shake gently. Before first use, prime pump. After use, clean tip and replace cap. (Patient not taking: Reported on 2/24/2025) 16 g 3    furosemide (Lasix) 20 mg  "tablet Take 1 tablet (20 mg) by mouth once daily. 90 tablet 3    losartan (Cozaar) 25 mg tablet Take 1 tablet (25 mg) by mouth once daily. 90 tablet 3    magnesium oxide (Mag-Ox) 400 mg tablet Take 1 tablet (400 mg) by mouth once daily. 90 tablet 1    metoprolol succinate XL (Toprol-XL) 25 mg 24 hr tablet TAKE 1 TABLET(25 MG) BY MOUTH DAILY IN THE MORNING. DO NOT. START BEFORE APRIL 5, 2024 90 tablet 1    multivitamin tablet Take 1 tablet by mouth once daily.      nitroglycerin (Nitrostat) 0.4 mg SL tablet Place 1 tablet (0.4 mg) under the tongue every 5 minutes if needed for chest pain. 25 tablet 3    rosuvastatin (Crestor) 5 mg tablet Take 1 tablet (5 mg) by mouth once daily at bedtime. 90 tablet 1     No current facility-administered medications on file prior to visit.         RELEVANT LAB RESULTS:  Lab Results   Component Value Date    BILITOT 0.4 01/14/2025    CALCIUM 9.8 01/14/2025    CO2 28 01/14/2025     01/14/2025    CREATININE 0.74 01/14/2025    GLUCOSE 95 01/14/2025    ALKPHOS 73 01/14/2025    K 4.4 01/14/2025    PROT 7.3 01/14/2025     01/14/2025    AST 14 01/14/2025    ALT 10 01/14/2025    BUN 19 01/14/2025    ANIONGAP 13 01/14/2025    MG 2.03 04/05/2024    PHOS 3.0 11/17/2023     (H) 11/13/2023    ALBUMIN 4.2 01/14/2025    AMYLASE 40 09/26/2019    LIPASE 10 11/12/2023    GFRF 90 01/12/2023     Lab Results   Component Value Date    TRIG 70 01/14/2025    CHOL 141 01/14/2025    LDLCALC 68 01/14/2025    HDL 58.9 01/14/2025     No results found for: \"BMCBC\", \"CBCDIF\"     PHARMACEUTICAL ASSESSMENT:    MEDICATION RECONCILIATION    Home Pharmacy Reviewed? Yes, describe: [facility]       Drug Interactions? No    Medication Documentation Review Audit       Reviewed by Randy Thomas MD (Physician) on 02/24/25 at 0920      Medication Order Taking? Sig Documenting Provider Last Dose Status   amLODIPine (Norvasc) 5 mg tablet [id number] Yes Take 1 tablet (5 mg) by mouth once daily. Rishabh" D'Amico, DO  Active   aspirin 81 mg EC tablet [id number] Yes Take 1 tablet (81 mg) by mouth once daily. Historical Provider, MD  Active   cholecalciferol (Vitamin D-3) 5,000 Units tablet [id number] Yes Take 1 tablet (5,000 Units) by mouth once daily. Historical Provider, MD  Active   cyanocobalamin (Vitamin B-12) 1,000 mcg tablet [id number] Yes Take 1 tablet (1,000 mcg) by mouth once daily. Historical Provider, MD  Active   dofetilide (Tikosyn) 250 mcg capsule [id number] Yes Take 1 capsule (250 mcg) by mouth every 12 hours. NAWAF Eller-CNP  Active   Eliquis 5 mg tablet [id number]  Take 1 tablet (5 mg) by mouth every 12 hours.   Patient not taking: Reported on 2/24/2025    Randy Thomas MD  Active   fluticasone (Flonase) 50 mcg/actuation nasal spray [id number]  Administer 1 spray into each nostril once daily as needed for rhinitis or allergies. Shake gently. Before first use, prime pump. After use, clean tip and replace cap.   Patient not taking: Reported on 2/24/2025    Christopher D'Amico, DO  Active   furosemide (Lasix) 20 mg tablet [id number] Yes Take 1 tablet (20 mg) by mouth once daily. Randy Thomas MD  Active   losartan (Cozaar) 25 mg tablet [id number] Yes Take 1 tablet (25 mg) by mouth once daily. Randy Thomas MD  Active   magnesium oxide (Mag-Ox) 400 mg tablet [id number] Yes Take 1 tablet (400 mg) by mouth once daily. Kishore May PA-C  Active   metoprolol succinate XL (Toprol-XL) 25 mg 24 hr tablet [id number] Yes TAKE 1 TABLET(25 MG) BY MOUTH DAILY IN THE MORNING. DO NOT. START BEFORE APRIL 5, 2024 GREGORIO Eller  Active   multivitamin tablet [id number] Yes Take 1 tablet by mouth once daily. Historical Provider, MD  Active   nitroglycerin (Nitrostat) 0.4 mg SL tablet [id number] Yes Place 1 tablet (0.4 mg) under the tongue every 5 minutes if needed for chest pain. Randy Thomas MD  Active   rosuvastatin (Crestor) 5 mg tablet [id number] Yes Take 1 tablet (5 mg) by  mouth once daily at bedtime. Christopher D'Amico, DO  Active                    DISEASE MANAGEMENT ASSESSMENT:     ANTICOAGULATION ASSESSMENT    The ASCVD Risk score (Westley JONES, et al., 2019) failed to calculate for the following reasons:    Risk score cannot be calculated because patient has a medical history suggesting prior/existing ASCVD    DIAGNOSIS: prevention of 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  - Patient is projected to be on anticoagulation indefinite  - [id number] Score: [5] (only included if diagnosis is atrial fibrillation)   Age: [<65 (0)] [65-74 (+1)] [> 75 (+2)]: 2  Sex: [Male/Female (+1)]: 1  CHF history: [No/Yes(+1)]: 0  Hypertension history: [No/Yes(+1)]: 1  Stroke/TIA/thromboembolism history: [No/Yes(+2)]: 0  Vascular disease history (prior MI, peripheral artery disease, aortic plaque): [No/Yes(+1)]: 1  Diabetes history: [No/Yes(+1)]: 0      Affordability/Accessibility:  eliquis $145/month 25% coinsurance  Adherence/Organization: currently out of eliquis  Adverse Reactions: none reported  Recent Hospitalizations: no  Recent Falls/Trauma: no      EDUCATION/COUNSELING:   - Counseled patient on MOA, expectations, duration of therapy, contraindications, administration, and monitoring parameters  - Counseled patient of side effects that are indicative of bleeding such as dark tarry stool, unexplainable bruising, or vomiting up a coffee ground like substance          DISCUSSION/NOTES:    Patient Assistance Program (PAP)    Application for program to be submitted for the following medications: [facility] Permanent Address: [facility]   Prescription Insurance:   Yes   Members of Household: 2   Files Taxes: Yes       Patient will be dropping of financial paperwork [facility].     Patient verbally reports monthly or yearly income which is less than 400% federal poverty level    Patient aware this process may take up to 6 weeks.     If approved  medication must be filled through [facility] and MEDICATION WILL BE MAILED TO PATIENT.          ASSESSMENT:    Assessment/Plan   Problem List Items Addressed This Visit    None        RECOMMENDATIONS/PLAN:    Restart Eliquis 5mg bid    Last Appnt with Referring Provider: 2/24  Next Appnt with Referring Provider: 6/2  Clinical Pharmacist follow up: 3/27 @ 10a  VAF/Application Expiration: pending  Type of Encounter: Arpit Burciaga PharmD    Verbal consent to manage patient's drug therapy was obtained from the patient . They were informed they may decline to participate or withdraw from participation in pharmacy services at any time.    Continue all meds under the continuation of care with the referring provider and clinical pharmacy team.

## 2025-02-27 ENCOUNTER — APPOINTMENT (OUTPATIENT)
Dept: PHARMACY | Facility: HOSPITAL | Age: 80
End: 2025-02-27
Payer: MEDICARE

## 2025-02-27 DIAGNOSIS — Z86.73: ICD-10-CM

## 2025-02-27 DIAGNOSIS — I48.91 ATRIAL FIBRILLATION, UNSPECIFIED TYPE (MULTI): ICD-10-CM

## 2025-02-27 RX ORDER — APIXABAN 5 MG/1
5 TABLET, FILM COATED ORAL EVERY 12 HOURS
Qty: 180 TABLET | Refills: 3 | Status: SHIPPED | OUTPATIENT
Start: 2025-02-27 | End: 2026-02-27

## 2025-03-03 ENCOUNTER — APPOINTMENT (OUTPATIENT)
Dept: CARDIOLOGY | Facility: CLINIC | Age: 80
End: 2025-03-03
Payer: MEDICARE

## 2025-03-04 ENCOUNTER — PATIENT OUTREACH (OUTPATIENT)
Dept: PRIMARY CARE | Facility: CLINIC | Age: 80
End: 2025-03-04
Payer: MEDICARE

## 2025-03-04 DIAGNOSIS — I10 ESSENTIAL HYPERTENSION: ICD-10-CM

## 2025-03-04 DIAGNOSIS — E78.2 MIXED HYPERLIPIDEMIA: ICD-10-CM

## 2025-03-04 NOTE — PROGRESS NOTES
RN TONJA spoke with Jazzy, identified by name, shannan. Medications, appointments, insurance information, treatment goals reviewed. Questions answered.     She does not eat much anymore and does not eat breakfast, maybe lunch and large dinner. She was encouraged small frequent meals including fresh fruit. She verbalized understanding. She plans to sign up for exercise classes at her local recreation center, in effort to lose some weight, prior to next outreach. She has no needs at this time    Treatment goals include:  A diet rich in fruits and vegetables, lean meats and plant-based sources of protein, avoid added sugars, salt, and processed foods. Small frequent meals.  Regular exercise: if tolerable, 3-4 days a week for at least 30 minutes or more. Work toward a goal BMI of less than 25, although in the shorter term, losing even 10 pounds will help  BP of 120/80, no higher than 140/85 on consistent basis.   LDL cholesterol less than 100, and HDL cholesterol above 40.    Jazzy was encouraged to call with questions/concerns

## 2025-03-05 DIAGNOSIS — I48.91 ATRIAL FIBRILLATION (MULTI): ICD-10-CM

## 2025-03-05 PROCEDURE — RXMED WILLOW AMBULATORY MEDICATION CHARGE

## 2025-03-05 RX ORDER — METOPROLOL SUCCINATE 25 MG/1
TABLET, EXTENDED RELEASE ORAL
Qty: 90 TABLET | Refills: 1 | Status: SHIPPED | OUTPATIENT
Start: 2025-03-05

## 2025-03-06 PROCEDURE — RXMED WILLOW AMBULATORY MEDICATION CHARGE

## 2025-03-07 ENCOUNTER — PHARMACY VISIT (OUTPATIENT)
Dept: PHARMACY | Facility: CLINIC | Age: 80
End: 2025-03-07
Payer: MEDICARE

## 2025-03-17 PROCEDURE — RXMED WILLOW AMBULATORY MEDICATION CHARGE

## 2025-03-19 ENCOUNTER — APPOINTMENT (OUTPATIENT)
Dept: PHARMACY | Facility: HOSPITAL | Age: 80
End: 2025-03-19
Payer: MEDICARE

## 2025-03-19 ENCOUNTER — PHARMACY VISIT (OUTPATIENT)
Dept: PHARMACY | Facility: CLINIC | Age: 80
End: 2025-03-19
Payer: MEDICARE

## 2025-03-19 DIAGNOSIS — Z86.73: ICD-10-CM

## 2025-03-19 DIAGNOSIS — I48.91 ATRIAL FIBRILLATION, UNSPECIFIED TYPE (MULTI): ICD-10-CM

## 2025-03-19 NOTE — PROGRESS NOTES
Pharmacist Clinic: Cardiology Management    Benny Schaeffer is a 79 y.o. female was referred to Clinical Pharmacy Team for anticoag management.     Referring Provider: Randy Thomas, *    THIS IS A FOLLOW UP PATIENT APPOINTMENT. AT LAST VISIT ON 2/27 WITH PHARMACIST (misha).    Appointment was completed by benny who was reached at .    REVIEW OF PAST APPNT (IF APPLICABLE):   Enrolled patient assistance  Had been off eliquis due to cost.    Allergies Reviewed? Yes    No Known Allergies    Past Medical History:   Diagnosis Date    Adhesive capsulitis of left shoulder 08/04/2022    Adhesive capsulitis of left shoulder    Benign neoplasm of descending colon 04/04/2023    Breast cancer (Multi)     left breast    Congenital hiatus hernia 03/20/2024    Gastroduodenitis 03/20/2024    Hx antineoplastic chemo     left breast    Numbness of face 03/20/2024    Personal history of irradiation     left breast    Personal history of other benign neoplasm     History of other benign neoplasm       Current Outpatient Medications on File Prior to Visit   Medication Sig Dispense Refill    amLODIPine (Norvasc) 5 mg tablet Take 1 tablet (5 mg) by mouth once daily. 90 tablet 1    aspirin 81 mg EC tablet Take 1 tablet (81 mg) by mouth once daily.      cholecalciferol (Vitamin D-3) 5,000 Units tablet Take 1 tablet (5,000 Units) by mouth once daily.      cyanocobalamin (Vitamin B-12) 1,000 mcg tablet Take 1 tablet (1,000 mcg) by mouth once daily.      dofetilide (Tikosyn) 250 mcg capsule Take 1 capsule (250 mcg) by mouth every 12 hours. 180 capsule 3    Eliquis 5 mg tablet Take 1 tablet (5 mg) by mouth every 12 hours. 180 tablet 3    fluticasone (Flonase) 50 mcg/actuation nasal spray Administer 1 spray into each nostril once daily as needed for rhinitis or allergies. Shake gently. Before first use, prime pump. After use, clean tip and replace cap. (Patient not taking: Reported on 2/24/2025) 16 g 3    furosemide  "(Lasix) 20 mg tablet Take 1 tablet (20 mg) by mouth once daily. 90 tablet 3    losartan (Cozaar) 25 mg tablet Take 1 tablet (25 mg) by mouth once daily. 90 tablet 3    magnesium oxide (Mag-Ox) 400 mg tablet Take 1 tablet (400 mg) by mouth once daily. 90 tablet 1    metoprolol succinate XL (Toprol-XL) 25 mg 24 hr tablet TAKE 1 TABLET(25 MG) BY MOUTH DAILY IN THE MORNING. DO NOT. START BEFORE APRIL 5, 2024 90 tablet 1    multivitamin tablet Take 1 tablet by mouth once daily.      nitroglycerin (Nitrostat) 0.4 mg SL tablet Place 1 tablet (0.4 mg) under the tongue every 5 minutes if needed for chest pain. 25 tablet 3    rosuvastatin (Crestor) 5 mg tablet Take 1 tablet (5 mg) by mouth once daily at bedtime. 90 tablet 1     No current facility-administered medications on file prior to visit.         RELEVANT LAB RESULTS:  Lab Results   Component Value Date    BILITOT 0.4 01/14/2025    CALCIUM 9.8 01/14/2025    CO2 28 01/14/2025     01/14/2025    CREATININE 0.74 01/14/2025    GLUCOSE 95 01/14/2025    ALKPHOS 73 01/14/2025    K 4.4 01/14/2025    PROT 7.3 01/14/2025     01/14/2025    AST 14 01/14/2025    ALT 10 01/14/2025    BUN 19 01/14/2025    ANIONGAP 13 01/14/2025    MG 2.03 04/05/2024    PHOS 3.0 11/17/2023     (H) 11/13/2023    ALBUMIN 4.2 01/14/2025    AMYLASE 40 09/26/2019    LIPASE 10 11/12/2023    GFRF 90 01/12/2023     Lab Results   Component Value Date    TRIG 70 01/14/2025    CHOL 141 01/14/2025    LDLCALC 68 01/14/2025    HDL 58.9 01/14/2025     No results found for: \"BMCBC\", \"CBCDIF\"     PHARMACEUTICAL ASSESSMENT:    MEDICATION RECONCILIATION    Home Pharmacy Reviewed? Yes, describe: [facility]       Drug Interactions? No    Medication Documentation Review Audit       Reviewed by Amita Ma (Coordinator) on 03/04/25 at 1145      Medication Order Taking? Sig Documenting Provider Last Dose Status   amLODIPine (Norvasc) 5 mg tablet [id number]  Take 1 tablet (5 mg) by mouth once daily. " Christopher D'Amico, DO  Active   aspirin 81 mg EC tablet [id number]  Take 1 tablet (81 mg) by mouth once daily. Historical Provider, MD  Active   cholecalciferol (Vitamin D-3) 5,000 Units tablet [id number] No Take 1 tablet (5,000 Units) by mouth once daily. Historical Provider, MD Taking Active   cyanocobalamin (Vitamin B-12) 1,000 mcg tablet [id number] No Take 1 tablet (1,000 mcg) by mouth once daily. Historical Provider, MD Taking Active   dofetilide (Tikosyn) 250 mcg capsule [id number] No Take 1 capsule (250 mcg) by mouth every 12 hours. NAWAF Eller-CNP Taking Active   Patient not taking:  Discontinued 02/27/25 1058   Eliquis 5 mg tablet [id number]  Take 1 tablet (5 mg) by mouth every 12 hours. Randy Thomas MD  Active   fluticasone (Flonase) 50 mcg/actuation nasal spray [id number]  Administer 1 spray into each nostril once daily as needed for rhinitis or allergies. Shake gently. Before first use, prime pump. After use, clean tip and replace cap.   Patient not taking: Reported on 2/24/2025    Christopher D'Amico, DO  Active   furosemide (Lasix) 20 mg tablet [id number]  Take 1 tablet (20 mg) by mouth once daily. Randy Thomas MD  Active   losartan (Cozaar) 25 mg tablet [id number] No Take 1 tablet (25 mg) by mouth once daily. Randy Thomas MD Taking Active   magnesium oxide (Mag-Ox) 400 mg tablet [id number] No Take 1 tablet (400 mg) by mouth once daily. Kishore May PA-C Taking Active   metoprolol succinate XL (Toprol-XL) 25 mg 24 hr tablet [id number] No TAKE 1 TABLET(25 MG) BY MOUTH DAILY IN THE MORNING. DO NOT. START BEFORE APRIL 5, 2024 NAWAF Eller-CNP Taking Active   multivitamin tablet [id number] No Take 1 tablet by mouth once daily. Historical Provider, MD Taking Active   nitroglycerin (Nitrostat) 0.4 mg SL tablet [id number] No Place 1 tablet (0.4 mg) under the tongue every 5 minutes if needed for chest pain. Randy Thomas MD Taking Active   rosuvastatin (Crestor)  5 mg tablet [id number]  Take 1 tablet (5 mg) by mouth once daily at bedtime. Christopher D'Amico, DO  Active                    DISEASE MANAGEMENT ASSESSMENT:     ANTICOAGULATION ASSESSMENT    The ASCVD Risk score (Westley JONES, et al., 2019) failed to calculate for the following reasons:    Risk score cannot be calculated because patient has a medical history suggesting prior/existing ASCVD    DIAGNOSIS: prevention of 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  - Patient is projected to be on anticoagulation indefinite  - [id number] Score: [5] (only included if diagnosis is atrial fibrillation)   Age: [<65 (0)] [65-74 (+1)] [> 75 (+2)]: 2  Sex: [Male/Female (+1)]: 1  CHF history: [No/Yes(+1)]: 0  Hypertension history: [No/Yes(+1)]: 1  Stroke/TIA/thromboembolism history: [No/Yes(+2)]: 0  Vascular disease history (prior MI, peripheral artery disease, aortic plaque): [No/Yes(+1)]: 1  Diabetes history: [No/Yes(+1)]: 0      Affordability/Accessibility:  eliquis $145/month 25% coinsurance  Adherence/Organization: no issues noted  Adverse Reactions: none reported  Recent Hospitalizations: no  Recent Falls/Trauma: no      EDUCATION/COUNSELING:   - Counseled patient on MOA, expectations, duration of therapy, contraindications, administration, and monitoring parameters  - Counseled patient of side effects that are indicative of bleeding such as dark tarry stool, unexplainable bruising, or vomiting up a coffee ground like substance          DISCUSSION/NOTES:   Enrolled in patient assistance through 3/6/26  No new issues with restart  Reminded of refill procedures through [facility].    ASSESSMENT:    Assessment/Plan   Problem List Items Addressed This Visit    None        RECOMMENDATIONS/PLAN:    Continue Eliquis 5mg bid    Last Appnt with Referring Provider: 2/24  Next Appnt with Referring Provider: 6/2  Clinical Pharmacist follow up: 9/17 @ 1020a  VAF/Application Expiration: 3/6/2026  Type of Encounter:  Arpit Burciaga PharmD    Verbal consent to manage patient's drug therapy was obtained from the patient . They were informed they may decline to participate or withdraw from participation in pharmacy services at any time.    Continue all meds under the continuation of care with the referring provider and clinical pharmacy team.

## 2025-03-27 ENCOUNTER — APPOINTMENT (OUTPATIENT)
Dept: PHARMACY | Facility: HOSPITAL | Age: 80
End: 2025-03-27
Payer: MEDICARE

## 2025-04-09 ENCOUNTER — PATIENT OUTREACH (OUTPATIENT)
Dept: PRIMARY CARE | Facility: CLINIC | Age: 80
End: 2025-04-09
Payer: MEDICARE

## 2025-04-09 DIAGNOSIS — I48.91 ATRIAL FIBRILLATION, UNSPECIFIED TYPE (MULTI): ICD-10-CM

## 2025-04-09 DIAGNOSIS — J44.9 CHRONIC OBSTRUCTIVE PULMONARY DISEASE, UNSPECIFIED COPD TYPE (MULTI): ICD-10-CM

## 2025-04-09 DIAGNOSIS — I10 ESSENTIAL HYPERTENSION: ICD-10-CM

## 2025-04-09 NOTE — PROGRESS NOTES
RN TONJA spoke with Jazzy, identified by name, shannan. Medications, appointments, treatment goals, insurance information, labs, imaging reviewed. Questions answered. Jazzy began implementing walking into her health regimen. She walks with her sister and brother. She c/o sometimes getting dizzy and lightheaded. She endorses shortness of breath. No other symptoms. PCP notified. She is a former smoker and has Afib, for which she is prescribed Eliquis and Dofetilide. She has a follow up appointment with Cardiology on 2025. Jazzy was encouraged to notify Cardiology in effort for a sooner appointment. PCP notified for Pulmonology appointment. Jazzy was encouraged to visit ER if symptoms worsens. She verbalized understanding.     Treatment goals include:  Continue medications as prescribed.   Maintain a diet rich in fruits and vegetables, lean meats and plant-based sources of protein, avoid added sugars, salt, and processed foods.  Continue regular exercise/walking: if tolerable, 3-4 days a week for at least 30 minutes or more.   BP of 120/80, no higher than 140/85 on consistent basis.   Remain abstinent from smoking.  Stay up to date with immunizations against influenza and pneumonia. Pulmonary rehabilitation as needed. These goals have been shown to improve function and reduce risk of subsequent COPD exacerbations. Seek immediate medical attention for difficulty breathing, wheezing, excessive cough, increased sputum production    Jazzy was encouraged to call with questions/concerns

## 2025-04-14 PROCEDURE — RXMED WILLOW AMBULATORY MEDICATION CHARGE

## 2025-04-17 ENCOUNTER — PHARMACY VISIT (OUTPATIENT)
Dept: PHARMACY | Facility: CLINIC | Age: 80
End: 2025-04-17
Payer: MEDICARE

## 2025-04-17 DIAGNOSIS — R06.02 SHORTNESS OF BREATH: Primary | ICD-10-CM

## 2025-04-21 ENCOUNTER — OFFICE VISIT (OUTPATIENT)
Dept: CARDIOLOGY | Facility: CLINIC | Age: 80
End: 2025-04-21
Payer: MEDICARE

## 2025-04-21 VITALS
BODY MASS INDEX: 32.46 KG/M2 | HEART RATE: 101 BPM | DIASTOLIC BLOOD PRESSURE: 90 MMHG | HEIGHT: 65 IN | WEIGHT: 194.8 LBS | SYSTOLIC BLOOD PRESSURE: 165 MMHG

## 2025-04-21 DIAGNOSIS — I48.91 ATRIAL FIBRILLATION, UNSPECIFIED TYPE (MULTI): Primary | ICD-10-CM

## 2025-04-21 LAB
ATRIAL RATE: 79 BPM
Q ONSET: 221 MS
QRS COUNT: 17 BEATS
QRS DURATION: 94 MS
QT INTERVAL: 388 MS
QTC CALCULATION(BAZETT): 503 MS
QTC FREDERICIA: 461 MS
R AXIS: 47 DEGREES
T AXIS: 61 DEGREES
T OFFSET: 415 MS
VENTRICULAR RATE: 101 BPM

## 2025-04-21 PROCEDURE — 93010 ELECTROCARDIOGRAM REPORT: CPT | Performed by: INTERNAL MEDICINE

## 2025-04-21 PROCEDURE — 1159F MED LIST DOCD IN RCRD: CPT | Performed by: INTERNAL MEDICINE

## 2025-04-21 PROCEDURE — 3077F SYST BP >= 140 MM HG: CPT | Performed by: INTERNAL MEDICINE

## 2025-04-21 PROCEDURE — 99214 OFFICE O/P EST MOD 30 MIN: CPT | Performed by: INTERNAL MEDICINE

## 2025-04-21 PROCEDURE — 93005 ELECTROCARDIOGRAM TRACING: CPT | Performed by: INTERNAL MEDICINE

## 2025-04-21 PROCEDURE — 3079F DIAST BP 80-89 MM HG: CPT | Performed by: INTERNAL MEDICINE

## 2025-04-21 PROCEDURE — 1036F TOBACCO NON-USER: CPT | Performed by: INTERNAL MEDICINE

## 2025-04-21 PROCEDURE — 1160F RVW MEDS BY RX/DR IN RCRD: CPT | Performed by: INTERNAL MEDICINE

## 2025-04-21 PROCEDURE — 1126F AMNT PAIN NOTED NONE PRSNT: CPT | Performed by: INTERNAL MEDICINE

## 2025-04-21 ASSESSMENT — ENCOUNTER SYMPTOMS
LOSS OF SENSATION IN FEET: 0
OCCASIONAL FEELINGS OF UNSTEADINESS: 1
DEPRESSION: 1

## 2025-04-21 ASSESSMENT — PAIN SCALES - GENERAL: PAINLEVEL_OUTOF10: 0-NO PAIN

## 2025-04-21 NOTE — PROGRESS NOTES
Subjective:  Patient returns for an early follow-up.  She has been noticing some occasional chest discomfort and dyspnea.  She also has been having some lightheadedness and dizziness.  She has noticed that her heart rate has been elevated for about several weeks.  She denies any sensation of palpitations or presyncope.    She has been compliant with her Eliquis for over a month.  She fortunately was able to get this at an affordable cost.    Her blood pressure readings have been elevated, but she has been checking this before her morning medications.    She denies any stroke or TIA symptomatology.  She denies any bleeding problems despite good compliance with her Eliquis.    Objective:  General: Alert, usual pleasant self.  HEENT: Unchanged.  Lungs: Diminished air exchange with expiratory prolongation but no crackles or wheezing.  Cardiac: Distant heart tones without change.  Abdomen: Nontender.  Extremities: No edema.  Skin: No acute rash.  Neuro: Grossly unchanged.    EKG: Atrial fibrillation with somewhat rapid but ventricular response rate.    Impression/plan:  Patient unfortunately is back in atrial fibrillation with some symptomatology despite good compliance with her dofetilide.  I do think some of her symptomatology is referable to her atrial fibrillation.  I will thus plan on proceeding with a cardioversion on 4/23/2025 at the [facility].  I do suspect this will probably help her symptom status.    Her blood pressure remains elevated, but she had not taken her morning medications as of yet.  We will keep an eye on this and monitor this after her blood pressure medicines to be sure that we keep this under reasonable control.    She remains appropriately anticoagulated without any bleeding problems so we will continue her Eliquis unchanged.    Her lipid panels have historically been in reasonably good order, so we will continue her low-dose rosuvastatin unchanged as she does not tolerate higher  doses.    I will see her back in follow-up in 1 month but will make further recommendations after we hopefully get her back into sinus rhythm in several days.    Patient instructions:    Continue current medications unchanged.    Report for your cardioversion in 2 days when scheduled.    Return to clinic in 1 month.

## 2025-04-22 DIAGNOSIS — I48.19 ATRIAL FIBRILLATION, PERSISTENT (MULTI): ICD-10-CM

## 2025-04-22 DIAGNOSIS — I48.91 ATRIAL FIBRILLATION, UNSPECIFIED TYPE (MULTI): Primary | ICD-10-CM

## 2025-04-23 ENCOUNTER — HOSPITAL ENCOUNTER (OUTPATIENT)
Dept: CARDIOLOGY | Facility: HOSPITAL | Age: 80
Discharge: HOME | End: 2025-04-23
Payer: MEDICARE

## 2025-04-23 DIAGNOSIS — I48.91 ATRIAL FIBRILLATION, UNSPECIFIED TYPE (MULTI): ICD-10-CM

## 2025-04-23 DIAGNOSIS — I48.91 UNSPECIFIED ATRIAL FIBRILLATION (MULTI): ICD-10-CM

## 2025-04-23 DIAGNOSIS — I48.19 ATRIAL FIBRILLATION, PERSISTENT (MULTI): ICD-10-CM

## 2025-04-23 NOTE — SIGNIFICANT EVENT
Patient presents today for ELA +/- cardioversion for pAFib. On pre-procedure ECG, patient noted to be in SR. Dr. Thomas notified. ELA +/- cardioversion canceled. She will continue with current medication and follow up with Dr. Thomas as scheduled 5/19/25 with possible EP referral.     Lexie MCCRACKEN-CNP  Interventional Lab/Cardiology   [facility]

## 2025-04-25 PROCEDURE — RXMED WILLOW AMBULATORY MEDICATION CHARGE

## 2025-04-28 ENCOUNTER — TELEPHONE (OUTPATIENT)
Dept: CARDIOLOGY | Facility: CLINIC | Age: 80
End: 2025-04-28
Payer: MEDICARE

## 2025-04-28 ENCOUNTER — HOSPITAL ENCOUNTER (OUTPATIENT)
Dept: CARDIOLOGY | Facility: HOSPITAL | Age: 80
Discharge: HOME | End: 2025-04-28
Payer: MEDICARE

## 2025-04-28 ENCOUNTER — PHARMACY VISIT (OUTPATIENT)
Dept: PHARMACY | Facility: CLINIC | Age: 80
End: 2025-04-28
Payer: MEDICARE

## 2025-04-28 PROCEDURE — 93005 ELECTROCARDIOGRAM TRACING: CPT

## 2025-04-30 LAB
ATRIAL RATE: 57 BPM
P AXIS: 64 DEGREES
P OFFSET: 209 MS
P ONSET: 144 MS
PR INTERVAL: 154 MS
Q ONSET: 221 MS
QRS COUNT: 9 BEATS
QRS DURATION: 86 MS
QT INTERVAL: 464 MS
QTC CALCULATION(BAZETT): 451 MS
QTC FREDERICIA: 456 MS
R AXIS: 14 DEGREES
T AXIS: 46 DEGREES
T OFFSET: 453 MS
VENTRICULAR RATE: 57 BPM

## 2025-05-01 ENCOUNTER — PATIENT OUTREACH (OUTPATIENT)
Dept: PRIMARY CARE | Facility: CLINIC | Age: 80
End: 2025-05-01
Payer: MEDICARE

## 2025-05-01 DIAGNOSIS — I10 ESSENTIAL HYPERTENSION: ICD-10-CM

## 2025-05-01 DIAGNOSIS — J44.9 CHRONIC OBSTRUCTIVE PULMONARY DISEASE, UNSPECIFIED COPD TYPE (MULTI): ICD-10-CM

## 2025-05-01 NOTE — PROGRESS NOTES
Care Management Monthly Outreach  Chart review completed  Confirmation of at least 2 patient identifiers  Change in insurance? No    Has patient been to ER/Urgent Care since last outreach? No    Last Office Visit with PCP: 1/14/2025   Next Office Visit with PCP: 5/5/2025   Cardiology: 5/19/2025  Pumonology: 5/21/2025    APC Collaboration: Pharmacy    Chronic Conditions and Outreach Summary:   Chronic obstructive pulmonary disease, unspecified COPD type (Multi)    Essential hypertension    Jazzy c/o dyspnea on exertion. She has planned office visit with Pulmonology and Cardiology and was encouraged to keep these appointments. She verbalized understanding. She continues casual walks and use of light weighted leg machine at the [facility]. She c/o left hip pain after her last workout, for which, she is taking Tylenol for and using Bio-Freeze. She continues with healthy eating. With this and increased exercising, she reports a ten pound weight loss since starting her weight loss journey. She reports no current needs.    Medications:   Are there medication changes since last visit? No  Refills needed? No    Social Drivers of Health: Complete  Care Gaps Addressed? Needs Updated  Care Plan addressed: Yes    Upcoming Appointments:   Future Appointments       Date / Time Provider Department Dept Phone    5/5/2025 8:40 AM (Arrive by 8:25 AM) Christopher D'Amico, DO [facility] [phone]    5/19/2025 8:45 AM Randy Thomas MD [facility] [phone]    5/21/2025 1:00 PM [facility] [phone]    7/1/2025 8:20 AM (Arrive by 8:05 AM) Christopher D'Amico, DO [facility] [phone]    9/11/2025 8:30 AM (Arrive by 8:15 AM) [facility] [phone]    9/11/2025 9:30 AM Staci Buchanan, APRN-CNP [facility]     9/17/2025 2:20 PM (Arrive by 2:05 PM) Guillermo Burciaga,  PharmD [facility]  Arrive at: Your Home [phone]          Blood Pressures Reviewed  BP Readings from Last 3 Encounters:   04/21/25 165/90   02/24/25 140/80   01/14/25 159/63     Labs Reviewed:  Lab Results   Component Value Date    CREATININE 0.74 01/14/2025    GLUCOSE 95 01/14/2025    ALKPHOS 73 01/14/2025    K 4.4 01/14/2025    PROT 7.3 01/14/2025     01/14/2025    CALCIUM 9.8 01/14/2025    AST 14 01/14/2025    ALT 10 01/14/2025    BUN 19 01/14/2025    MG 2.03 04/05/2024    PHOS 3.0 11/17/2023     (H) 11/13/2023    GFRF 90 01/12/2023     Lab Results   Component Value Date    TRIG 70 01/14/2025    CHOL 141 01/14/2025    LDLCALC 68 01/14/2025    HDL 58.9 01/14/2025     Lab Results   Component Value Date    HGBA1C 5.6 11/12/2023    HGBA1C 5.7 (A) 01/12/2023    HGBA1C 5.5 03/31/2022     Lab Results   Component Value Date    WBC 5.5 01/14/2025    RBC 5.19 01/14/2025    HGB 13.4 01/14/2025     01/14/2025   No other concerns at this time.  Agreeable to continue monthly outreaches.  Encouraged to call if questions or concerns arise.

## 2025-05-05 ENCOUNTER — HOSPITAL ENCOUNTER (OUTPATIENT)
Dept: CARDIOLOGY | Facility: CLINIC | Age: 80
Discharge: HOME | End: 2025-05-05
Payer: MEDICARE

## 2025-05-05 ENCOUNTER — APPOINTMENT (OUTPATIENT)
Dept: PRIMARY CARE | Facility: CLINIC | Age: 80
End: 2025-05-05
Payer: MEDICARE

## 2025-05-05 VITALS
HEIGHT: 65 IN | HEART RATE: 86 BPM | SYSTOLIC BLOOD PRESSURE: 131 MMHG | DIASTOLIC BLOOD PRESSURE: 80 MMHG | WEIGHT: 193 LBS | BODY MASS INDEX: 32.15 KG/M2 | OXYGEN SATURATION: 94 %

## 2025-05-05 DIAGNOSIS — I10 ESSENTIAL HYPERTENSION: ICD-10-CM

## 2025-05-05 DIAGNOSIS — M25.552 LEFT HIP PAIN: ICD-10-CM

## 2025-05-05 DIAGNOSIS — M54.2 NECK PAIN: ICD-10-CM

## 2025-05-05 DIAGNOSIS — G95.9 CERVICAL MYELOPATHY: ICD-10-CM

## 2025-05-05 DIAGNOSIS — R00.2 PALPITATIONS: ICD-10-CM

## 2025-05-05 DIAGNOSIS — E78.2 MIXED HYPERLIPIDEMIA: ICD-10-CM

## 2025-05-05 DIAGNOSIS — J44.9 CHRONIC OBSTRUCTIVE PULMONARY DISEASE, UNSPECIFIED COPD TYPE (MULTI): ICD-10-CM

## 2025-05-05 DIAGNOSIS — I48.91 ATRIAL FIBRILLATION, UNSPECIFIED TYPE (MULTI): Primary | ICD-10-CM

## 2025-05-05 DIAGNOSIS — M54.50 LOW BACK PAIN, UNSPECIFIED BACK PAIN LATERALITY, UNSPECIFIED CHRONICITY, UNSPECIFIED WHETHER SCIATICA PRESENT: ICD-10-CM

## 2025-05-05 DIAGNOSIS — M16.11 ARTHRITIS OF RIGHT HIP: ICD-10-CM

## 2025-05-05 DIAGNOSIS — R00.2 PALPITATIONS: Primary | ICD-10-CM

## 2025-05-05 LAB — BODY SURFACE AREA: 2 M2

## 2025-05-05 PROCEDURE — 1124F ACP DISCUSS-NO DSCNMKR DOCD: CPT | Performed by: STUDENT IN AN ORGANIZED HEALTH CARE EDUCATION/TRAINING PROGRAM

## 2025-05-05 PROCEDURE — 1036F TOBACCO NON-USER: CPT | Performed by: STUDENT IN AN ORGANIZED HEALTH CARE EDUCATION/TRAINING PROGRAM

## 2025-05-05 PROCEDURE — 3079F DIAST BP 80-89 MM HG: CPT | Performed by: STUDENT IN AN ORGANIZED HEALTH CARE EDUCATION/TRAINING PROGRAM

## 2025-05-05 PROCEDURE — 1160F RVW MEDS BY RX/DR IN RCRD: CPT | Performed by: STUDENT IN AN ORGANIZED HEALTH CARE EDUCATION/TRAINING PROGRAM

## 2025-05-05 PROCEDURE — 93242 EXT ECG>48HR<7D RECORDING: CPT

## 2025-05-05 PROCEDURE — 99214 OFFICE O/P EST MOD 30 MIN: CPT | Performed by: STUDENT IN AN ORGANIZED HEALTH CARE EDUCATION/TRAINING PROGRAM

## 2025-05-05 PROCEDURE — G2211 COMPLEX E/M VISIT ADD ON: HCPCS | Performed by: STUDENT IN AN ORGANIZED HEALTH CARE EDUCATION/TRAINING PROGRAM

## 2025-05-05 PROCEDURE — 1159F MED LIST DOCD IN RCRD: CPT | Performed by: STUDENT IN AN ORGANIZED HEALTH CARE EDUCATION/TRAINING PROGRAM

## 2025-05-05 PROCEDURE — 3075F SYST BP GE 130 - 139MM HG: CPT | Performed by: STUDENT IN AN ORGANIZED HEALTH CARE EDUCATION/TRAINING PROGRAM

## 2025-05-05 PROCEDURE — RXMED WILLOW AMBULATORY MEDICATION CHARGE

## 2025-05-05 RX ORDER — PREDNISONE 20 MG/1
40 TABLET ORAL DAILY
Qty: 10 TABLET | Refills: 0 | Status: SHIPPED | OUTPATIENT
Start: 2025-05-05 | End: 2025-05-13

## 2025-05-05 ASSESSMENT — COLUMBIA-SUICIDE SEVERITY RATING SCALE - C-SSRS
6. HAVE YOU EVER DONE ANYTHING, STARTED TO DO ANYTHING, OR PREPARED TO DO ANYTHING TO END YOUR LIFE?: NO
1. IN THE PAST MONTH, HAVE YOU WISHED YOU WERE DEAD OR WISHED YOU COULD GO TO SLEEP AND NOT WAKE UP?: NO
2. HAVE YOU ACTUALLY HAD ANY THOUGHTS OF KILLING YOURSELF?: NO
1. IN THE PAST MONTH, HAVE YOU WISHED YOU WERE DEAD OR WISHED YOU COULD GO TO SLEEP AND NOT WAKE UP?: NO
6. HAVE YOU EVER DONE ANYTHING, STARTED TO DO ANYTHING, OR PREPARED TO DO ANYTHING TO END YOUR LIFE?: NO
2. HAVE YOU ACTUALLY HAD ANY THOUGHTS OF KILLING YOURSELF?: NO

## 2025-05-05 ASSESSMENT — PATIENT HEALTH QUESTIONNAIRE - PHQ9
2. FEELING DOWN, DEPRESSED OR HOPELESS: NOT AT ALL
2. FEELING DOWN, DEPRESSED OR HOPELESS: NOT AT ALL
SUM OF ALL RESPONSES TO PHQ9 QUESTIONS 1 AND 2: 0
1. LITTLE INTEREST OR PLEASURE IN DOING THINGS: NOT AT ALL
1. LITTLE INTEREST OR PLEASURE IN DOING THINGS: NOT AT ALL
SUM OF ALL RESPONSES TO PHQ9 QUESTIONS 1 AND 2: 0

## 2025-05-05 ASSESSMENT — ENCOUNTER SYMPTOMS
OCCASIONAL FEELINGS OF UNSTEADINESS: 1
LOSS OF SENSATION IN FEET: 0
DEPRESSION: 0

## 2025-05-05 NOTE — PROGRESS NOTES
79-year-old female presenting for multiple concerns.    A-fib a flutter, HTN  Stable, tolerates current regimen well.  Has been going in and out of A-fib.  Does not feel.  Was supposed to get cardioversion, but was in sinus rhythm.  Has cardiology appointment today.    Low back pain with sciatica, hip pain, neck pain  X-rays show arthritis throughout the hip and back, neck x-ray still pending.  Pain has worsened, particularly in the hip.  Reports that she has been walking more recently.  Did not do physical therapy in the interval.    COPD  Was diagnosed, on no treatments, became asymptomatic after quitting smoking.    History of urothelial carcinoma of bladder  Reports that she is in remission    SocHx:   - Smoking: About 55 pack years, quit a year and a half ago    Denies HA, changes in vision, chest pain, SOB/MARION, palpitations, N/V/D/C, dysuria/hematuria, hematochezia/melena, paresthesias, LE edema.    12 point ROS reviewed and negative other than as stated in HPI      General: Alert, oriented, pleasant, in no acute distress  HEENT:      Head: normocephalic, atraumatic;      eyes: EOMI, no scleral icterus;   Neck: soft, supple, non-tender, no masses appreciated  CV: A fib in office, no murmurs  Lungs: CTAB without wheezing, rhonchi or rales; good respiratory effort, no increased work of breathing  MSK: Mild tenderness to palpation of the right greater trochanter  Neuro: Cranial nerves grossly intact; alert and oriented  Psych: Appropriate mood and affect    #A-fib #atrial flutter #HTN  -Blood pressure at goal in office  - A-fib in office on auscultation  - Continue current regimen through general cardiology/EP  - Continue amlodipine 5 mg daily, losartan 25 mg daily, metoprolol succinate 25 mg daily  -On Eliquis 5 mg BID, dofetilide 250 mg BID  -Continue to follow cardiology    #HLD  - Continue rosuvastatin 5 mg daily    # Bilateral hip pain #low back pain #neck pain, cervical myelopathy  -Exacerbation in the hips  and back  -Hip x-ray and lumbar shows degenerative changes  -X-ray cervical spine still pending  -Prednisone 40 mg x 5 days  - Physical therapy referral reordered, orthopedics referral    #COPD  -Was diagnosed, on no treatments, became asymptomatic after quitting smoking    #Urothelial carcinoma of bladder  -Status post TURBT and chemotherapy  -Was following with urology, Dr. Bella    F/U as scheduled in July, will do Medicare at that time    Chris D'Amico, DO

## 2025-05-08 ENCOUNTER — PHARMACY VISIT (OUTPATIENT)
Dept: PHARMACY | Facility: CLINIC | Age: 80
End: 2025-05-08
Payer: MEDICARE

## 2025-05-19 ENCOUNTER — OFFICE VISIT (OUTPATIENT)
Dept: CARDIOLOGY | Facility: CLINIC | Age: 80
End: 2025-05-19
Payer: MEDICARE

## 2025-05-19 VITALS
BODY MASS INDEX: 33.14 KG/M2 | DIASTOLIC BLOOD PRESSURE: 80 MMHG | OXYGEN SATURATION: 97 % | HEART RATE: 65 BPM | HEIGHT: 65 IN | WEIGHT: 198.9 LBS | SYSTOLIC BLOOD PRESSURE: 135 MMHG

## 2025-05-19 DIAGNOSIS — I48.91 ATRIAL FIBRILLATION, UNSPECIFIED TYPE (MULTI): Primary | ICD-10-CM

## 2025-05-19 PROBLEM — K21.9 ESOPHAGEAL REFLUX: Status: RESOLVED | Noted: 2023-06-21 | Resolved: 2025-05-19

## 2025-05-19 LAB
ATRIAL RATE: 65 BPM
BODY SURFACE AREA: 2 M2
P AXIS: 61 DEGREES
P OFFSET: 212 MS
P ONSET: 146 MS
PR INTERVAL: 146 MS
Q ONSET: 219 MS
QRS COUNT: 11 BEATS
QRS DURATION: 96 MS
QT INTERVAL: 412 MS
QTC CALCULATION(BAZETT): 428 MS
QTC FREDERICIA: 422 MS
R AXIS: 41 DEGREES
T AXIS: 63 DEGREES
T OFFSET: 425 MS
VENTRICULAR RATE: 65 BPM

## 2025-05-19 PROCEDURE — 1159F MED LIST DOCD IN RCRD: CPT | Performed by: INTERNAL MEDICINE

## 2025-05-19 PROCEDURE — 1036F TOBACCO NON-USER: CPT | Performed by: INTERNAL MEDICINE

## 2025-05-19 PROCEDURE — 3079F DIAST BP 80-89 MM HG: CPT | Performed by: INTERNAL MEDICINE

## 2025-05-19 PROCEDURE — 3075F SYST BP GE 130 - 139MM HG: CPT | Performed by: INTERNAL MEDICINE

## 2025-05-19 PROCEDURE — 93005 ELECTROCARDIOGRAM TRACING: CPT | Performed by: INTERNAL MEDICINE

## 2025-05-19 PROCEDURE — 1126F AMNT PAIN NOTED NONE PRSNT: CPT | Performed by: INTERNAL MEDICINE

## 2025-05-19 PROCEDURE — 1160F RVW MEDS BY RX/DR IN RCRD: CPT | Performed by: INTERNAL MEDICINE

## 2025-05-19 PROCEDURE — 99214 OFFICE O/P EST MOD 30 MIN: CPT | Performed by: INTERNAL MEDICINE

## 2025-05-19 ASSESSMENT — PAIN SCALES - GENERAL: PAINLEVEL_OUTOF10: 0-NO PAIN

## 2025-05-19 ASSESSMENT — ENCOUNTER SYMPTOMS
OCCASIONAL FEELINGS OF UNSTEADINESS: 1
LOSS OF SENSATION IN FEET: 1
DEPRESSION: 0

## 2025-05-19 NOTE — PROGRESS NOTES
Subjective:  Jazzy returns for a 1 month follow-up.  We had planned on proceeding with a cardioversion for atrial fibrillation when I saw her about a month ago, but she fortunately spontaneously reverted.  We did have her wear a monitor for 72 hours, and she just turned this in recently so we did not have the results as of yet.  She overall all appears to be doing somewhat better from a cardiovascular standpoint.  She continues to struggle with her chronic dyspnea from her COPD.  She denies any chest pain or palpitations.  She is struggling with her sleep problems and is also having some right leg problems probably related to back issues.  She continues to remain compliant with her medications and is tolerating them well.  She denies any bleeding problems with Eliquis.    Objective:  General: Alert, usual self.  HEENT: Unchanged.  Lungs: Clear without crackles.  Cardiac: Distant heart tones without change.  Abdomen: Nontender  Extremities: No edema.  Skin: No rash.  Neuro: Grossly intact.    EKG: Normal sinus rhythm.  Within normal limits.    Impression/plan:  Jazzy generally appears to be doing somewhat better at this time with maintenance of sinus rhythm.  I did not think we needed to embark on any additional testing at this time, but I will review her monitor results with her when they are available.    Her blood pressure remains in good range, so we will continue her current regimen unchanged.    She remains in sinus rhythm so we will continue her Eliquis and dofetilide unchanged.    She remains compliant with a maximally tolerated but low dose of rosuvastatin, so we will continue this without change.    I instructed her to work with Dr. D'Amico regarding her sleep issues.  I also will have her continue with therapy physical therapy for her back problems.    I will see her back for routine follow-up in 3 months, but she knows to call for any setbacks.    Patient instructions:    Continue current medications  unchanged.    Return to clinic in 3 months.    Call for any intercurrent problems.

## 2025-05-21 ENCOUNTER — APPOINTMENT (OUTPATIENT)
Dept: PULMONOLOGY | Facility: CLINIC | Age: 80
End: 2025-05-21
Payer: MEDICARE

## 2025-06-02 ENCOUNTER — APPOINTMENT (OUTPATIENT)
Dept: CARDIOLOGY | Facility: CLINIC | Age: 80
End: 2025-06-02
Payer: MEDICARE

## 2025-06-02 PROCEDURE — RXMED WILLOW AMBULATORY MEDICATION CHARGE

## 2025-06-03 ENCOUNTER — PATIENT OUTREACH (OUTPATIENT)
Dept: PRIMARY CARE | Facility: CLINIC | Age: 80
End: 2025-06-03
Payer: MEDICARE

## 2025-06-03 DIAGNOSIS — I10 ESSENTIAL HYPERTENSION: ICD-10-CM

## 2025-06-03 DIAGNOSIS — I48.91 ATRIAL FIBRILLATION, UNSPECIFIED TYPE (MULTI): ICD-10-CM

## 2025-06-03 DIAGNOSIS — E78.2 MIXED HYPERLIPIDEMIA: ICD-10-CM

## 2025-06-03 NOTE — PROGRESS NOTES
Care Management Monthly Outreach  Chart review completed    Last Office Visit: 5/5/2025  Next Office Visit: 7/1/2025   APC: Pharmacy    Has patient been to ER/Urgent Care since last outreach? No    Outreach attempted: left message on voicemail requesting return call..    Amita Ma

## 2025-06-04 ENCOUNTER — PHARMACY VISIT (OUTPATIENT)
Dept: PHARMACY | Facility: CLINIC | Age: 80
End: 2025-06-04
Payer: MEDICARE

## 2025-06-04 ENCOUNTER — PATIENT OUTREACH (OUTPATIENT)
Dept: PRIMARY CARE | Facility: CLINIC | Age: 80
End: 2025-06-04
Payer: MEDICARE

## 2025-06-04 DIAGNOSIS — I10 ESSENTIAL HYPERTENSION: ICD-10-CM

## 2025-06-04 DIAGNOSIS — E78.2 MIXED HYPERLIPIDEMIA: ICD-10-CM

## 2025-06-04 NOTE — PROGRESS NOTES
Care Management Monthly Outreach  Chart review completed  Confirmation of at least 2 patient identifiers  Change in insurance? No  Has patient been to ER/Urgent Care since last outreach? No    Last Office Visit with PCP: 5/5/2025   Next Office Visit with PCP: 7/1/2025   APC Collaboration: Pharmacy    Chronic Conditions and Outreach Summary:   Essential hypertension    Mixed hyperlipidemia  Medications, appointments, labs, treatment goals, insurance information reviewed. Questions answered. Jazzy c/o right lower extremity pain that begins in the buttock and radiates down the thigh and around the knee and has worsen since beginning therapy. She has an Orthopedic appointment tomorrow. She was encouraged to reschedule a missed Pulmonology appointment which was made due to c/o shortness of breath with exertion. She verbalized understanding and plans to do so.   Treatment goals include:  Treatment goals include:   Implement and maintain a Mediterranean diet, which is a primarily plant-based eating pattern that emphasizes fruits, vegetables, whole grains, legumes, herbs, spices, nuts and healthy fats like olive oil. It also includes moderate amounts of fish, dairy, poultry, and eggs. Avoid red meat and processed foods.   BP of 120/80, no higher than 140/85 on consistent basis.   LDL cholesterol less than 100, and HDL cholesterol above 40.    Medications:   Are there medication changes since last visit? No  Refills needed? No    Social Drivers of Health: Addressed in the last 6 months  Care Gaps Addressed? Deferred  Care Plan addressed: Yes  Upcoming Appointments:   Future Appointments       Date / Time Provider Department Dept Phone    6/5/2025 2:25 PM (Arrive by 2:10 PM) [facility] X-[facility] 4  [place] [phone]    6/5/2025 2:30 PM (Arrive by 2:15 PM) Gary Vera MD  [place] [phone]    7/1/2025 8:20 AM (Arrive by 8:05 AM) Christopher D'Amico, DO  [facility]  [phone]    8/25/2025 9:15 AM Randy Thomas MD [facility] [phone]    9/11/2025 8:30 AM (Arrive by 8:15 AM) [facility] MAM[facility] 1 [facility] [phone]    9/11/2025 9:30 AM Staci Buchanan, APRN-CNP [facility]     9/17/2025 2:20 PM (Arrive by 2:05 PM) Guillermo Burciaga, PharmD [facility]  Arrive at: [facility] [phone]    11/11/2025 8:20 AM (Arrive by 8:05 AM) Christopher D'Amico, DO [facility] [phone]        Blood Pressures Reviewed  BP Readings from Last 3 Encounters:   05/19/25 135/80   05/05/25 131/80   04/21/25 165/90   Labs Reviewed:  Lab Results   Component Value Date    CREATININE 0.74 01/14/2025    GLUCOSE 95 01/14/2025    ALKPHOS 73 01/14/2025    K 4.4 01/14/2025    PROT 7.3 01/14/2025     01/14/2025    CALCIUM 9.8 01/14/2025    AST 14 01/14/2025    ALT 10 01/14/2025    BUN 19 01/14/2025    MG 2.03 04/05/2024    PHOS 3.0 11/17/2023     (H) 11/13/2023    GFRF 90 01/12/2023     Lab Results   Component Value Date    TRIG 70 01/14/2025    CHOL 141 01/14/2025    LDLCALC 68 01/14/2025    HDL 58.9 01/14/2025     Lab Results   Component Value Date    HGBA1C 5.6 11/12/2023    HGBA1C 5.7 (A) 01/12/2023    HGBA1C 5.5 03/31/2022     Lab Results   Component Value Date    WBC 5.5 01/14/2025    RBC 5.19 01/14/2025    HGB 13.4 01/14/2025     01/14/2025   No other concerns at this time.  Agreeable to continue monthly outreaches.  Encouraged to call if questions or concerns arise.    Amita Ma

## 2025-06-05 ENCOUNTER — HOSPITAL ENCOUNTER (OUTPATIENT)
Dept: RADIOLOGY | Facility: CLINIC | Age: 80
Discharge: HOME | End: 2025-06-05
Payer: MEDICARE

## 2025-06-05 ENCOUNTER — OFFICE VISIT (OUTPATIENT)
Dept: ORTHOPEDIC SURGERY | Facility: CLINIC | Age: 80
End: 2025-06-05
Payer: MEDICARE

## 2025-06-05 VITALS — WEIGHT: 198 LBS | BODY MASS INDEX: 32.99 KG/M2 | HEIGHT: 65 IN

## 2025-06-05 DIAGNOSIS — M43.06 SPONDYLOLYSIS, LUMBAR REGION: Primary | ICD-10-CM

## 2025-06-05 DIAGNOSIS — M16.11 ARTHRITIS OF RIGHT HIP: ICD-10-CM

## 2025-06-05 DIAGNOSIS — M54.31 SCIATICA OF RIGHT SIDE: ICD-10-CM

## 2025-06-05 DIAGNOSIS — M25.551 PAIN OF RIGHT HIP: ICD-10-CM

## 2025-06-05 PROCEDURE — 1159F MED LIST DOCD IN RCRD: CPT | Performed by: ORTHOPAEDIC SURGERY

## 2025-06-05 PROCEDURE — 1036F TOBACCO NON-USER: CPT | Performed by: ORTHOPAEDIC SURGERY

## 2025-06-05 PROCEDURE — 99203 OFFICE O/P NEW LOW 30 MIN: CPT | Performed by: ORTHOPAEDIC SURGERY

## 2025-06-05 PROCEDURE — 73502 X-RAY EXAM HIP UNI 2-3 VIEWS: CPT | Mod: RT

## 2025-06-05 ASSESSMENT — PAIN - FUNCTIONAL ASSESSMENT: PAIN_FUNCTIONAL_ASSESSMENT: 0-10

## 2025-06-05 ASSESSMENT — PAIN DESCRIPTION - DESCRIPTORS: DESCRIPTORS: THROBBING;ACHING;SHARP

## 2025-06-05 ASSESSMENT — PAIN SCALES - GENERAL: PAINLEVEL_OUTOF10: 10 - WORST POSSIBLE PAIN

## 2025-06-05 NOTE — PROGRESS NOTES
"Patient was reviewed and discussed with CARMENCITA and/or orthopedic resident.  Patient was seen and evaluated in conjunction with CARMENCITA and/or orthopedic resident. Findings and treatment plan were discussed and approved by myself, Dr. Vera.    WD/WN obese female.  Seated in chair listing to the left.  A+O X3  No lymphedema  Inspection of both hips shows no deformity.   Minimal right hip pain with log roll.   Tender over lateral trochanter.  Good strength against resistance with flexion, extension, abduction and adduction.   Good pulses.   Sensation intact to light touch.   Symmetric reflexes.    I personally reviewed the following radiographic exams: AP pelvis and right hip show symmetric mild hip arthrosis.  No acute changes.  Previous x-rays of the lumbar spine shows severe multifocal lumbar spondylosis.    Assessment: Right leg sciatica with lumbar spondylosis.  Mild, minimally symptomatic hip arthrosis.  Compensatory trochanteric bursitis.    Plan: Discussed nonoperative and operative options in detail.   Risk and benefits discussed in detail. All questions answered today.  Recovery timeline and expectations discussed in detail.  I think most the pathology is based on the lumbar spine.  I think her trochanteric bursitis is relative to her gait and lumbar spine and pelvic obliquity.  We discussed possible diagnostic/therapeutic injections in the bursa or the hip though she wants a \"guarantee that they will work\".  I think being evaluated for lumbar spine which is probably the origin of most of her symptoms is appropriate.  "

## 2025-06-05 NOTE — PROGRESS NOTES
79-year-old -American female with history of A-fib on Eliquis presents for evaluation of generalized right hip pain.  Patient states she been having ongoing hip pain for about the last year which has worsened over the last 3 to 4 months.  She denies any trauma.  She endorses shooting pain from her back all the way to her feet.  She notices some weakness.  She states this pain is worsened with activity.  She has tried physical therapy which did not provide any relief.  She does take Tylenol as needed.  She denies any injection to her hip area or her back.  She does note undergoing evaluation for her spine previously and does note prior cervical spine surgery.  She denies any numbness or tingling.

## 2025-07-01 ENCOUNTER — APPOINTMENT (OUTPATIENT)
Dept: PRIMARY CARE | Facility: CLINIC | Age: 80
End: 2025-07-01
Payer: MEDICARE

## 2025-07-11 ENCOUNTER — PATIENT OUTREACH (OUTPATIENT)
Dept: PRIMARY CARE | Facility: CLINIC | Age: 80
End: 2025-07-11

## 2025-07-11 ENCOUNTER — PATIENT OUTREACH (OUTPATIENT)
Dept: PRIMARY CARE | Facility: CLINIC | Age: 80
End: 2025-07-11
Payer: MEDICARE

## 2025-07-11 DIAGNOSIS — I10 ESSENTIAL HYPERTENSION: ICD-10-CM

## 2025-07-11 DIAGNOSIS — E78.2 MIXED HYPERLIPIDEMIA: ICD-10-CM

## 2025-07-11 DIAGNOSIS — I48.91 ATRIAL FIBRILLATION, UNSPECIFIED TYPE (MULTI): ICD-10-CM

## 2025-07-11 NOTE — PROGRESS NOTES
Care Management Monthly Outreach  Chart review completed    Last Office Visit: 5/5/2025  Next Office Visit: 11/11/2025   APC: Pharmacy    Has patient been to ER/Urgent Care since last outreach? No    Outreach attempted: left message on voicemail requesting return call..    Amita Ma

## 2025-07-11 NOTE — PROGRESS NOTES
Care Management Monthly Outreach  Chart review completed  Confirmation of at least 2 patient identifiers  Change in insurance? No  Has patient been to ER/Urgent Care since last outreach? No    Last Office Visit with PCP: 5/5/2025   Next Office Visit with PCP: 11/11/2025   APC Collaboration: Pharmacy    Chronic Conditions and Outreach Summary:   Essential hypertension    Atrial fibrillation, unspecified type (Multi)    Mixed hyperlipidemia  Medications, appointments, labs, treatment goals, insurance information reviewed. Questions answered. Jazzy continues with hip pain. She reports that the pain began to be worrisome intermittently after sustaining a fall at her sister's home a year ago. The pain was initially at the right hip and is now bilateral hips. She is currently having difficulty ambulating and stepping up on things, such as steps and curb. She reports using a cane at times. She was encouraged and educated to use cane at all times for stability and mobility support. She verbalized understanding. 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 RN CM discussed results with Jazzy. She verbalized understanding.     She's had a visit with Ortho Surg, Dr Vera on June 5, 2025, referred by PCP. After assessment, Dr Vera referred Jazzy to Physical Medicine Rehab. RN CM made Jazzy aware that the appointment with Physical Med Rehab had already been scheduled for July 18, 2025 at 9:20 a.m., with Dr. Seble Fay, as she was not aware of this appointment.  In the meantime, Jazzy is taking Tylenol 650 mg for her hip pain, with minimal relief. She was encouraged warm, moist heat to the hips and educated on bleeding risks with taking Nsaids while on prescribed Eliquis. She verbalized understanding.     Medications:   Are there medication changes since last visit? No  Refills needed? No    Social Drivers of  Health: Deferred  Care Gaps Addressed? Deferred  Care Plan addressed: No  Upcoming Appointments:   Future Appointments       Date / Time Provider Department Dept Phone    7/18/2025 9:20 AM (Arrive by 9:05 AM) Seble Fay MD [facility] [phone]    8/25/2025 9:15 AM Randy Thomas MD [facility] [phone]    9/11/2025 8:30 AM (Arrive by 8:15 AM) [facility] MAM[facility] 1 [facility] [phone]    9/11/2025 9:30 AM Staci Buchanan, APRN-CNP [facility]     9/17/2025 2:20 PM (Arrive by 2:05 PM) Guillermo Burciaga, PharmALIDA [facility]  Arrive at: [facility] [phone]    11/11/2025 8:20 AM (Arrive by 8:05 AM) Christopher D'Amico, DO [facility] [phone]        Blood Pressures Reviewed  BP Readings from Last 3 Encounters:   05/19/25 135/80   05/05/25 131/80   04/21/25 165/90   Labs Reviewed:  Lab Results   Component Value Date    CREATININE 0.74 01/14/2025    GLUCOSE 95 01/14/2025    ALKPHOS 73 01/14/2025    K 4.4 01/14/2025    PROT 7.3 01/14/2025     01/14/2025    CALCIUM 9.8 01/14/2025    AST 14 01/14/2025    ALT 10 01/14/2025    BUN 19 01/14/2025    MG 2.03 04/05/2024    PHOS 3.0 11/17/2023     (H) 11/13/2023    GFRF 90 01/12/2023     Lab Results   Component Value Date    TRIG 70 01/14/2025    CHOL 141 01/14/2025    LDLCALC 68 01/14/2025    HDL 58.9 01/14/2025     Lab Results   Component Value Date    HGBA1C 5.6 11/12/2023    HGBA1C 5.7 (A) 01/12/2023    HGBA1C 5.5 03/31/2022     Lab Results   Component Value Date    WBC 5.5 01/14/2025    RBC 5.19 01/14/2025    HGB 13.4 01/14/2025     01/14/2025   No other concerns at this time.  Agreeable to continue monthly outreaches.  Encouraged to call if questions or concerns arise.    Amita Ma

## 2025-07-14 DIAGNOSIS — I48.91 ATRIAL FIBRILLATION (MULTI): ICD-10-CM

## 2025-07-14 PROCEDURE — RXMED WILLOW AMBULATORY MEDICATION CHARGE

## 2025-07-14 RX ORDER — DOFETILIDE 0.25 MG/1
250 CAPSULE ORAL EVERY 12 HOURS
Qty: 180 CAPSULE | Refills: 1 | Status: SHIPPED | OUTPATIENT
Start: 2025-07-14 | End: 2026-07-14

## 2025-07-16 ENCOUNTER — PHARMACY VISIT (OUTPATIENT)
Dept: PHARMACY | Facility: CLINIC | Age: 80
End: 2025-07-16
Payer: MEDICARE

## 2025-07-18 ENCOUNTER — APPOINTMENT (OUTPATIENT)
Dept: PHYSICAL MEDICINE AND REHAB | Facility: CLINIC | Age: 80
End: 2025-07-18
Payer: MEDICARE

## 2025-07-25 ENCOUNTER — APPOINTMENT (OUTPATIENT)
Dept: PHYSICAL MEDICINE AND REHAB | Facility: CLINIC | Age: 80
End: 2025-07-25
Payer: MEDICARE

## 2025-07-25 VITALS — DIASTOLIC BLOOD PRESSURE: 80 MMHG | HEART RATE: 69 BPM | RESPIRATION RATE: 22 BRPM | SYSTOLIC BLOOD PRESSURE: 138 MMHG

## 2025-07-25 DIAGNOSIS — M54.16 RIGHT LUMBAR RADICULOPATHY: ICD-10-CM

## 2025-07-25 DIAGNOSIS — M16.11 ARTHRITIS OF RIGHT HIP: Primary | ICD-10-CM

## 2025-07-25 DIAGNOSIS — M70.61 GREATER TROCHANTERIC BURSITIS, RIGHT: ICD-10-CM

## 2025-07-25 PROCEDURE — RXMED WILLOW AMBULATORY MEDICATION CHARGE

## 2025-07-25 PROCEDURE — 99204 OFFICE O/P NEW MOD 45 MIN: CPT | Performed by: PHYSICAL MEDICINE & REHABILITATION

## 2025-07-25 RX ORDER — DICLOFENAC SODIUM 10 MG/G
4 GEL TOPICAL 4 TIMES DAILY PRN
Qty: 100 G | Refills: 3 | Status: SHIPPED | OUTPATIENT
Start: 2025-07-25

## 2025-07-25 RX ORDER — GABAPENTIN 100 MG/1
CAPSULE ORAL
Qty: 90 CAPSULE | Refills: 3 | Status: SHIPPED | OUTPATIENT
Start: 2025-07-25

## 2025-07-25 ASSESSMENT — PAIN SCALES - GENERAL: PAINLEVEL_OUTOF10: 4

## 2025-07-25 NOTE — PROGRESS NOTES
Ref Dr. Vera for Lower back and hip pain.  Recent xray of right hip.  Recent physical therapy that was painful and ineffective.

## 2025-07-25 NOTE — PATIENT INSTRUCTIONS
1, get xray of spine done   2. Then call to schedule Mri lumbar spine  3. Call to schedule water therapy at the [facility]  4., call to schedule with pain management (Dr Jama) for evaluation for hip injection and in the future possible spine injection  5. Trial gabapentin 100 mg at night for a week and then increase to 100 mg twice a day (morning/night),. Ok to keep increasing slowly (one time a week) until you get to 300 mg in am and 300 mg at night.,  6,. Schedule Sports medicine referral (Dr Magana) to ultrasound guided R hip bursa injection

## 2025-07-25 NOTE — PROGRESS NOTES
Physical Medicine and Rehabilitation MSK Consult  25       Chief Complaint:  Low back pain     HPI:  Jazzy Schaeffer is a  79 y.o. f who presents to the clinic today  for evaluation of low back pain.  She has a ho of cervical spine fusion 2019 c3-c6.    Onset: one year ago  Location: as above  Radiation: buttock, lateral thigh anterior thigh and lower leg to the ankle   Feels the right is leg   Quality: constant pain  Duration: constant, and sometimes intermittent  Aggravating factors:  walking, sleeping and laying on the R side, sitting to stand  Alleviating factors: sitting  Severity: 5  Numbness/Tingling: Yes -   Bowel or bladder incontinence: Yes - chronic  Pt's current medication regimen includes:      Treatment to date:  Physical therapy: Yes -3 visits and couldn't tolerate due to pain 2025  Diane, land therapy  Water therapy at [facility] for the same problem in last 2024 w some relief  Medications taken to date for this complaint include the followin mg tylenol 3-4/day  Can't take nsaids  Prior injections: Yes - low back Over 20 years ago       Imaging  Reviewed w patient and personally 25      Xr hips 2025  IMPRESSION:      Moderate to advanced osteoarthritis of the bilateral hips  right-greater-than-left.      No evidence of right hip fracture or lesion.       Mri c spine      IMPRESSION:  1. Significant interval worsening of degenerative changes at C3-C4  and C4-C5 as compared to prior study with new signal abnormality  within the cord at these levels. The intramedullary signal  abnormalities may reflect areas of cord edema versus myelomalacia.  The findings at C5-C6 appear similar to prior exam.  Medical History[1]     Surgical History[2]     Patient Active Problem List    Diagnosis Date Noted    Lumbar radiculopathy 2024    Diaphragmatic hernia 2024    Residual hemorrhoidal skin tags 2024    Acute renal failure syndrome 2024    Fall 2024     History of cardioembolic cerebrovascular accident (CVA) 03/20/2024    History of cerebrovascular accident 03/20/2024    Nausea 03/20/2024    Obesity 03/20/2024    Peptic ulcer 03/20/2024    Renal vascular disorder 03/20/2024    Mild sleep apnea 01/03/2024    Vitamin D deficiency 01/03/2024    Hospital discharge follow-up 01/03/2024    Rhinitis medicamentosa 01/03/2024    Bilateral leg edema 01/03/2024    Anemia, unspecified 12/26/2023    Renal infarction (Multi) 11/12/2023    Hemisensory deficit 10/23/2023    Abnormal blood chemistry 06/21/2023    Abnormal chest CT 06/21/2023    Mammogram abnormal 06/21/2023    Abnormal stress test 06/21/2023    Adhesive capsulitis of left shoulder 06/21/2023    Allergic rhinitis 06/21/2023    Anxiety 06/21/2023    Arterial ischemic stroke, PCA (posterior cerebral artery), left, chronic 06/21/2023    Arthritis 06/21/2023    Gastritis 06/21/2023    Bladder mass 06/21/2023    Bilateral sensorineural hearing loss 06/21/2023    Asymmetric SNHL (sensorineural hearing loss) 06/21/2023    Benign paroxysmal positional vertigo of right ear 06/21/2023    Atherosclerotic heart disease of native coronary artery without angina pectoris 06/21/2023    Bladder tumor 06/21/2023    Cervical disc disorder with myelopathy 06/21/2023    Cervical spondylosis with myelopathy 06/21/2023    Cervical myelopathy 06/21/2023    Cervical disc herniation 06/21/2023    Chronic sinusitis 06/21/2023    Claudication 06/21/2023    Cigarette nicotine dependence without complication 06/21/2023    Depression 06/21/2023    Degenerative cervical spinal stenosis 06/21/2023    Congestion of nasal sinus 06/21/2023    Combined form of age-related cataract, both eyes 06/21/2023    Age-related nuclear cataract of both eyes 06/21/2023    Diverticulitis of colon 06/21/2023    Dry eyes, bilateral 06/21/2023    Vertigo 06/21/2023    Dizziness 06/21/2023    Diverticulosis 06/21/2023    Ductal carcinoma in situ (DCIS) of left breast  06/21/2023    Hematuria 06/21/2023    Gait disturbance 06/21/2023    Frequent headaches 06/21/2023    Fatigue 06/21/2023    Essential hypertension 06/21/2023    Early satiety 06/21/2023    Dysphagia 06/21/2023    Insomnia 06/21/2023    Regular astigmatism of both eyes with presbyopia 06/21/2023    Hyperopia of both eyes 06/21/2023    Hyperlipidemia 06/21/2023    History of colon polyps 06/21/2023    Lower extremity pain 06/21/2023    Hip pain 06/21/2023    Kidney stone 06/21/2023    Lumbar canal stenosis 06/21/2023    Lumbar radiculitis 06/21/2023    Obstructive sleep apnea 06/21/2023    Numbness and tingling in both hands 06/21/2023    Neuralgia and neuritis 06/21/2023    Memory changes 06/21/2023    Lymphadenopathy 06/21/2023    Malignant neoplasm of upper-outer quadrant of left female breast 06/21/2023    PVD (peripheral vascular disease) 06/21/2023    PMR (polymyalgia rheumatica) (Multi) 06/21/2023    Cervicalgia 06/21/2023    Radial scar of breast 06/21/2023    Urinary frequency 06/21/2023    Tinnitus of left ear 06/21/2023    Stroke (Multi) 06/21/2023    Shoulder pain 06/21/2023    S/P cervical spinal fusion 06/21/2023    Right homonymous hemianopsia 06/21/2023    Urothelial carcinoma of bladder 06/21/2023    Weight loss, unintentional 06/21/2023    Vitamin D insufficiency 06/21/2023    Visual hallucinations 06/21/2023    Valvular heart disease 06/21/2023    Right flank pain 06/21/2023    Restrictive lung disease 06/21/2023    History of malignant neoplasm 04/04/2023    Atrial fibrillation (Multi) 04/01/2022    History of malignant neoplasm of breast 04/01/2022    Hypoactive labyrinth, unilateral 03/10/2003    Encounter for monitoring dofetilide therapy 03/27/2024     Afib on eliquis  Htn  Cad  Hlp  Family History[3]     Current Medications[4]     Allergies[5]     Social History[6]   Lives w son  Retired  Was working until 78;   No smoking alcohol or drug use  Quit smoking 2 years ago    Review of  Systems:  Constitutional:  Denies fever or chills, malaise, weight changes.   Eyes:  Denies change in visual acuity   HENT:  Denies nasal congestion or sore throat   Respiratory:  Denies cough or shortness of breath   Cardiovascular:  Denies chest pain or edema   GI:  Denies abdominal pain, nausea, vomiting, bloody stools or diarrhea   :  Denies dysuria   Integument:  Denies rash   Neurologic:  As per HPI  MSK: Per above HPI  Endocrine:  Denies polyuria or polydipsia   Lymphatic:  Denies swollen glands   Psychiatric:  Denies depression or anxiety            PHYSICAL EXAM:  /80 (BP Location: Left arm, Patient Position: Sitting)   Pulse 69   Resp 22   LMP  (LMP Unknown)     General:  NAD, well developed, f      Psychiatric: appropriate mood & affect.   Cardiovascular:  Normal pedal pulses; no LE edema.  Respiratory:  Normal rate; unlabored breathing.  Skin:  Intact; no erythema; no ecchymosis or rash.  Lymphatic:  No lymphadenopathy or lymphedema.  NEURO:  Alert and appropriate. Speech fluent, conversing appropriately.   Motor:    Rt: HF 5/5, KE 5/5, KF 5/5, DF 5/5, EHL 5/5, PF 5/5.    Lt: HF 5/5, KE 5/5, KF 5/5, DF 5/5, EHL 5/5, PF 5/5.  Sensation:     Light touch: intact in the b/l L3-S1 dermatomes.    Reflexes:     Right:  patellar 2+, achilles 2+,     Left: patellar 2+, achilles 2+,     Babinski's downgoing b/l; no clonus  Gait: Normal, narrow based gait.     MSK:  Inspection reveals no evidence of a pelvic obliqity   Spinal range of motion: Flexion to 40° degrees, Extension of 10 degrees.  There is tenderness over the R paraspinals L spine, greater trochanter.   Special tests:    Straight leg raise: + on R    Slump test: + on R    Facet loading: + bl          Impression: Jazzy Schaeffer is a 79 y.o. F w pmh of htn, hlp, afib on eliquis presenting with right lumbar radiculopathy, R greater trochanter bursitis and R hip arthritis.    Encounter Diagnoses   Name Primary?    Right lumbar radiculopathy      Arthritis of right hip Yes    Greater trochanteric bursitis, right            Plan:  Orders Placed This Encounter   Procedures    XR lumbar spine 2-3 views    MR lumbar spine wo IV contrast    Referral to Physical Therapy    Referral to Sports Medicine    Referral to Pain Medicine    1. Xr l spine eval djd  2. Mri l spine; R lumbar radic; had 3 sessions of therapy June 2025 but couldn't tolerate  3. Referral to sports med:eval for R greater trochanter bursa us guided injection  4. Referral to pain management; eval for R hip steroid injection, in future might need tfesi, on eliquis   5. Referral to water therapy; R lumbar radic, R hip arthritis, R greater trochanter bursitis; work on [employer], gait   5. gabapentin 100 mg bid, titration explained, discussed can titrate to 300 mg bid  6. Voltaren gel prn    Fu 6-8 weeks  Thank you for the consultation.     Seble Fay MD  Physical Medicine and Rehabilitation          [1]   Past Medical History:  Diagnosis Date    ADHD (attention deficit hyperactivity disorder)     Anxiety     Arthritis     Atrial fibrillation (Multi)     Benign neoplasm of descending colon 04/04/2023    Bladder cancer (Multi)     Breast cancer 2019    left breast    CHF (congestive heart failure)     Congenital hiatus hernia 03/20/2024    COPD (chronic obstructive pulmonary disease) (Multi)     Esophageal reflux 06/21/2023    Gastroduodenitis 03/20/2024    GERD (gastroesophageal reflux disease)     Headache     Heart disease     Hx antineoplastic chemo     left breast    Hypertension     Numbness of face 03/20/2024    EVA (obstructive sleep apnea)     Personal history of irradiation     left breast    Stroke (Multi)     Left PCA ischemic CVA 3/2022    Visual impairment    [2]   Past Surgical History:  Procedure Laterality Date    BI MAMMO GUIDED BREAST LEFT LOCALIZATION Left 04/08/2019    BI MAMMO GUIDED LOCALIZATION BREAST LEFT 4/8/2019 [facility]IB LEGAC[facility]    BI MAMMO GUIDED  BREAST RIGHT LOCALIZATION Right 11/03/2017    BI MAMMO GUIDED LOCALIZATION BREAST RIGHT 11/3/2017 [facility] AIB LEGAC[facility]    BLADDER TUMOR EXCISION  2021    x2    BREAST LUMPECTOMY Left 04/2019    CARDIOVERSION      CERVICAL FUSION  06/15/2020    C 3-5    COLON SURGERY  07/11/2017    COLONOSCOPY      CT ANGIO CORONARY ART WITH HEARTFLOW IF SCORE >30%  01/30/2020    CT HEART CORONARY ANGIOGRAM 1/30/2020 [facility] EMERGENCY LEGACY    CT ANGIO NECK  03/31/2022    CT NECK ANGIO W AND WO IV CONTRAST 3/31/2022 [facility] CLINICAL LEGACY    CT HEAD ANGIO W AND WO IV CONTRAST  03/31/2022    CT HEAD ANGIO W AND WO IV CONTRAST 3/31/2022 [facility] CLINICAL LEGACY    ESOPHAGOGASTRODUODENOSCOPY      HYSTERECTOMY      LYMPH NODE BIOPSY      MASTECTOMY Left 05/22/2019    MR HEAD ANGIO WO IV CONTRAST  03/30/2022    MR HEAD ANGIO WO IV CONTRAST 3/30/2022 [facility] CLINICAL LEGACY    MR NECK ANGIO WO IV CONTRAST  03/30/2022    MR NECK ANGIO WO IV CONTRAST 3/30/2022 [facility] CLINICAL LEGACY   [3]   Family History  Problem Relation Name Age of Onset    Breast cancer Sister      Breast cancer Father's Sister      Breast cancer Niece     [4]   Current Outpatient Medications   Medication Sig Dispense Refill    amLODIPine (Norvasc) 5 mg tablet Take 1 tablet (5 mg) by mouth once daily. 90 tablet 1    aspirin 81 mg EC tablet Take 1 tablet (81 mg) by mouth once daily.      dofetilide (Tikosyn) 250 mcg capsule Take 1 capsule (250 mcg) by mouth every 12 hours. 180 capsule 1    Eliquis 5 mg tablet Take 1 tablet (5 mg) by mouth every 12 hours. 180 tablet 3    furosemide (Lasix) 20 mg tablet Take 1 tablet (20 mg) by mouth once daily. 90 tablet 3    losartan (Cozaar) 25 mg tablet Take 1 tablet (25 mg) by mouth once daily. 90 tablet 3    metoprolol succinate XL (Toprol-XL) 25 mg 24 hr tablet TAKE 1 TABLET(25 MG) BY MOUTH DAILY IN THE MORNING. DO NOT. START BEFORE APRIL 5, 2024 90 tablet 1    multivitamin tablet Take 1 tablet by mouth once daily.      nitroglycerin (Nitrostat)  0.4 mg SL tablet Place 1 tablet (0.4 mg) under the tongue every 5 minutes if needed for chest pain. 25 tablet 3    rosuvastatin (Crestor) 5 mg tablet Take 1 tablet (5 mg) by mouth once daily at bedtime. 90 tablet 1    magnesium oxide (Mag-Ox) 400 mg tablet Take 1 tablet (400 mg) by mouth once daily. 90 tablet 1     No current facility-administered medications for this visit.   [5] No Known Allergies  [6]   Social History  Socioeconomic History    Marital status:    Tobacco Use    Smoking status: Former     Current packs/day: 0.00     Types: Cigarettes     Quit date: 2022     Years since quitting: 3.5    Smokeless tobacco: Never   Substance and Sexual Activity    Alcohol use: Never    Drug use: Never    Sexual activity: Defer     Social Drivers of Health     Financial Resource Strain: Low Risk  (4/4/2024)    Overall Financial Resource Strain (CARDIA)     Difficulty of Paying Living Expenses: Not very hard   Food Insecurity: No Food Insecurity (4/4/2024)    Hunger Vital Sign     Worried About Running Out of Food in the Last Year: Never true     Ran Out of Food in the Last Year: Never true   Transportation Needs: No Transportation Needs (4/4/2024)    PRAPARE - Transportation     Lack of Transportation (Medical): No     Lack of Transportation (Non-Medical): No   Physical Activity: Inactive (4/4/2024)    Exercise Vital Sign     Days of Exercise per Week: 0 days     Minutes of Exercise per Session: 0 min   Stress: No Stress Concern Present (4/4/2024)    [employer] - Occupational Stress Questionnaire     Feeling of Stress : Not at all   Intimate Partner Violence: Not At Risk (4/4/2024)    Humiliation, Afraid, Rape, and Kick questionnaire     Fear of Current or Ex-Partner: No     Emotionally Abused: No     Physically Abused: No     Sexually Abused: No   Housing Stability: Low Risk  (4/4/2024)    Housing Stability Vital Sign     Unable to Pay for Housing in the Last Year: No     Number of  Places Lived in the Last Year: 1     Unstable Housing in the Last Year: No

## 2025-07-29 ENCOUNTER — PHARMACY VISIT (OUTPATIENT)
Dept: PHARMACY | Facility: CLINIC | Age: 80
End: 2025-07-29
Payer: COMMERCIAL

## 2025-08-05 ENCOUNTER — PATIENT OUTREACH (OUTPATIENT)
Dept: PRIMARY CARE | Facility: CLINIC | Age: 80
End: 2025-08-05
Payer: MEDICARE

## 2025-08-05 DIAGNOSIS — I10 ESSENTIAL HYPERTENSION: ICD-10-CM

## 2025-08-05 DIAGNOSIS — E78.2 MIXED HYPERLIPIDEMIA: ICD-10-CM

## 2025-08-05 NOTE — PROGRESS NOTES
Care Management Monthly Outreach  Chart review completed  Confirmation of at least 2 patient identifiers  Change in insurance? No  Has patient been to ER/Urgent Care since last outreach? No    Last Office Visit with PCP: 5/5/2025   Next Office Visit with PCP: 11/11/2025   APC Collaboration: Pharmacy    Chronic Conditions and Outreach Summary:   Essential hypertension    Mixed hyperlipidemia  Medications, appointments, labs, treatment goals, insurance information reviewed. Questions answered. Jazzy started visits with [facility] Med [facility] Rehab for low back and hip pain. RN CM reviewed the after visit summary with Jazzy. Phys Med-Rehab prescribed Gabapentin and Voltaren cream. MICHELLE EVANGELISTA educated Jazzy on these medications. She verbalized understanding. She is in the process of completing paperwork with [employer] for transportation to appointments. She was encouraged to schedule requested xray, MRI and pain management appointments soon. Jazzy verbalized understanding.   Treatment goals include:  A diet rich in fruits and vegetables, whole grains, lean meats and plant-based sources of protein, fish healthier fats like olive oil. Avoid added sugars, salt, and processed foods.  Regular exercise: if tolerable, 3-4 days a week for at least 30 minutes or more.   BP of 120/80, no higher than 140/85 on consistent basis.   LDL cholesterol less than 100, and HDL cholesterol above 40.  Take medications as prescribed  Keep all appointments    Medications:   Are there medication changes since last visit? Yes: Gabapentin, Voltaren-MICHELLE EVANGELISTA educated Jazzy on these medications  Refills needed? No    Social Drivers of Health: Deferred  Care Gaps Addressed? Deferred  Care Plan addressed: No    Upcoming Appointments:   Future Appointments       Date / Time Provider Department Dept Phone    8/25/2025 9:15 AM Randy Thomas MD [facility] [phone]    9/11/2025 8:30 AM (Arrive by 8:15 AM) CORTNEY  [id number] [facility] [facility] [phone]    9/11/2025 9:30 AM Staci Buchanan, APRN-CNP [facility]     9/12/2025 8:20 AM (Arrive by 8:05 AM) Seble Fay MD [facility] [phone]    9/17/2025 2:20 PM (Arrive by 2:05 PM) Heladio Rangel, PharmD [facility]  Arrive at: Your Home [phone]    11/11/2025 8:20 AM (Arrive by 8:05 AM) Christopher D'Amico, DO  [facility] [phone]        Blood Pressures Reviewed  BP Readings from Last 3 Encounters:   07/25/25 138/80   05/19/25 135/80   05/05/25 131/80     Labs Reviewed:  Lab Results   Component Value Date    CREATININE 0.74 01/14/2025    GLUCOSE 95 01/14/2025    ALKPHOS 73 01/14/2025    K 4.4 01/14/2025    PROT 7.3 01/14/2025     01/14/2025    CALCIUM 9.8 01/14/2025    AST 14 01/14/2025    ALT 10 01/14/2025    BUN 19 01/14/2025    MG 2.03 04/05/2024    PHOS 3.0 11/17/2023     (H) 11/13/2023    GFRF 90 01/12/2023     Lab Results   Component Value Date    TRIG 70 01/14/2025    CHOL 141 01/14/2025    LDLCALC 68 01/14/2025    HDL 58.9 01/14/2025     Lab Results   Component Value Date    HGBA1C 5.6 11/12/2023    HGBA1C 5.7 (A) 01/12/2023    HGBA1C 5.5 03/31/2022   No other concerns at this time.  Agreeable to continue monthly outreaches.  Encouraged to call if questions or concerns arise.    Jose Manuel Ma, MICHELLE  Chronic Care Management  [phone]

## 2025-08-11 ENCOUNTER — HOSPITAL ENCOUNTER (OUTPATIENT)
Dept: RADIOLOGY | Facility: CLINIC | Age: 80
Discharge: HOME | End: 2025-08-11
Payer: MEDICARE

## 2025-08-11 DIAGNOSIS — M54.16 RIGHT LUMBAR RADICULOPATHY: ICD-10-CM

## 2025-08-11 DIAGNOSIS — M70.61 GREATER TROCHANTERIC BURSITIS, RIGHT: ICD-10-CM

## 2025-08-11 DIAGNOSIS — M16.11 ARTHRITIS OF RIGHT HIP: ICD-10-CM

## 2025-08-11 PROCEDURE — 72110 X-RAY EXAM L-2 SPINE 4/>VWS: CPT

## 2025-08-11 PROCEDURE — 72110 X-RAY EXAM L-2 SPINE 4/>VWS: CPT | Performed by: RADIOLOGY

## 2025-08-20 DIAGNOSIS — I10 ESSENTIAL HYPERTENSION: ICD-10-CM

## 2025-08-20 PROCEDURE — RXMED WILLOW AMBULATORY MEDICATION CHARGE

## 2025-08-20 RX ORDER — LOSARTAN POTASSIUM 25 MG/1
25 TABLET ORAL DAILY
Qty: 90 TABLET | Refills: 3 | Status: SHIPPED | OUTPATIENT
Start: 2025-08-20 | End: 2026-08-20

## 2025-08-25 ENCOUNTER — OFFICE VISIT (OUTPATIENT)
Dept: CARDIOLOGY | Facility: CLINIC | Age: 80
End: 2025-08-25
Payer: MEDICARE

## 2025-08-25 ENCOUNTER — HOSPITAL ENCOUNTER (OUTPATIENT)
Dept: RADIOLOGY | Facility: CLINIC | Age: 80
Discharge: HOME | End: 2025-08-25
Payer: MEDICARE

## 2025-08-25 ENCOUNTER — PHARMACY VISIT (OUTPATIENT)
Dept: PHARMACY | Facility: CLINIC | Age: 80
End: 2025-08-25
Payer: MEDICARE

## 2025-08-25 VITALS
BODY MASS INDEX: 32.54 KG/M2 | OXYGEN SATURATION: 99 % | DIASTOLIC BLOOD PRESSURE: 75 MMHG | HEIGHT: 65 IN | SYSTOLIC BLOOD PRESSURE: 140 MMHG | HEART RATE: 61 BPM | WEIGHT: 195.3 LBS

## 2025-08-25 DIAGNOSIS — I25.10 ATHEROSCLEROSIS OF NATIVE CORONARY ARTERY, UNSPECIFIED WHETHER ANGINA PRESENT, UNSPECIFIED WHETHER NATIVE OR TRANSPLANTED HEART: Primary | ICD-10-CM

## 2025-08-25 DIAGNOSIS — I48.19 ATRIAL FIBRILLATION, PERSISTENT (MULTI): ICD-10-CM

## 2025-08-25 DIAGNOSIS — R07.9 CHEST PAIN, UNSPECIFIED TYPE: ICD-10-CM

## 2025-08-25 LAB
ATRIAL RATE: 61 BPM
P AXIS: 64 DEGREES
P OFFSET: 213 MS
P ONSET: 143 MS
PR INTERVAL: 150 MS
Q ONSET: 218 MS
QRS COUNT: 10 BEATS
QRS DURATION: 92 MS
QT INTERVAL: 460 MS
QTC CALCULATION(BAZETT): 463 MS
QTC FREDERICIA: 462 MS
R AXIS: 43 DEGREES
T AXIS: 56 DEGREES
T OFFSET: 448 MS
VENTRICULAR RATE: 61 BPM

## 2025-08-25 PROCEDURE — 70450 CT HEAD/BRAIN W/O DYE: CPT | Performed by: RADIOLOGY

## 2025-08-25 PROCEDURE — 3075F SYST BP GE 130 - 139MM HG: CPT | Performed by: INTERNAL MEDICINE

## 2025-08-25 PROCEDURE — 70450 CT HEAD/BRAIN W/O DYE: CPT

## 2025-08-25 PROCEDURE — 99214 OFFICE O/P EST MOD 30 MIN: CPT | Performed by: INTERNAL MEDICINE

## 2025-08-25 PROCEDURE — 1160F RVW MEDS BY RX/DR IN RCRD: CPT | Performed by: INTERNAL MEDICINE

## 2025-08-25 PROCEDURE — 1159F MED LIST DOCD IN RCRD: CPT | Performed by: INTERNAL MEDICINE

## 2025-08-25 PROCEDURE — 3078F DIAST BP <80 MM HG: CPT | Performed by: INTERNAL MEDICINE

## 2025-08-25 PROCEDURE — 99212 OFFICE O/P EST SF 10 MIN: CPT

## 2025-08-25 PROCEDURE — 93005 ELECTROCARDIOGRAM TRACING: CPT | Performed by: INTERNAL MEDICINE

## 2025-08-25 PROCEDURE — 1126F AMNT PAIN NOTED NONE PRSNT: CPT | Performed by: INTERNAL MEDICINE

## 2025-08-25 RX ORDER — NITROGLYCERIN 0.4 MG/1
0.4 TABLET SUBLINGUAL EVERY 5 MIN PRN
Qty: 25 TABLET | Refills: 3 | Status: SHIPPED | OUTPATIENT
Start: 2025-08-25

## 2025-08-25 ASSESSMENT — ENCOUNTER SYMPTOMS
DEPRESSION: 0
OCCASIONAL FEELINGS OF UNSTEADINESS: 1
LOSS OF SENSATION IN FEET: 0

## 2025-08-25 ASSESSMENT — PAIN SCALES - GENERAL: PAINLEVEL_OUTOF10: 0-NO PAIN

## 2025-08-29 ENCOUNTER — APPOINTMENT (OUTPATIENT)
Dept: RADIOLOGY | Facility: HOSPITAL | Age: 80
End: 2025-08-29
Payer: MEDICARE

## 2025-08-29 DIAGNOSIS — M54.16 RIGHT LUMBAR RADICULOPATHY: ICD-10-CM

## 2025-08-29 DIAGNOSIS — M16.11 ARTHRITIS OF RIGHT HIP: ICD-10-CM

## 2025-08-29 DIAGNOSIS — M70.61 GREATER TROCHANTERIC BURSITIS, RIGHT: ICD-10-CM

## 2025-08-29 PROCEDURE — 72148 MRI LUMBAR SPINE W/O DYE: CPT

## 2025-09-12 ENCOUNTER — APPOINTMENT (OUTPATIENT)
Dept: PHYSICAL MEDICINE AND REHAB | Facility: CLINIC | Age: 80
End: 2025-09-12
Payer: MEDICARE

## 2025-09-17 ENCOUNTER — APPOINTMENT (OUTPATIENT)
Dept: PHARMACY | Facility: HOSPITAL | Age: 80
End: 2025-09-17
Payer: MEDICARE

## 2025-11-11 ENCOUNTER — APPOINTMENT (OUTPATIENT)
Dept: PRIMARY CARE | Facility: CLINIC | Age: 80
End: 2025-11-11
Payer: MEDICARE